# Patient Record
Sex: MALE | Race: WHITE | NOT HISPANIC OR LATINO | Employment: OTHER | URBAN - METROPOLITAN AREA
[De-identification: names, ages, dates, MRNs, and addresses within clinical notes are randomized per-mention and may not be internally consistent; named-entity substitution may affect disease eponyms.]

---

## 2017-02-13 RX ORDER — FUROSEMIDE 40 MG/1
TABLET ORAL
Qty: 90 TABLET | Refills: 1 | Status: SHIPPED | OUTPATIENT
Start: 2017-02-13 | End: 2017-05-02 | Stop reason: SDUPTHER

## 2017-03-27 RX ORDER — TESTOSTERONE 16.2 MG/G
GEL TRANSDERMAL
Qty: 450 G | Refills: 1
Start: 2017-03-27 | End: 2018-03-05 | Stop reason: SDUPTHER

## 2017-05-02 ENCOUNTER — OFFICE VISIT (OUTPATIENT)
Dept: ENDOCRINOLOGY | Age: 82
End: 2017-05-02

## 2017-05-02 VITALS
SYSTOLIC BLOOD PRESSURE: 136 MMHG | DIASTOLIC BLOOD PRESSURE: 78 MMHG | HEIGHT: 72 IN | BODY MASS INDEX: 28.5 KG/M2 | HEART RATE: 59 BPM | OXYGEN SATURATION: 94 % | WEIGHT: 210.4 LBS

## 2017-05-02 DIAGNOSIS — I77.9 CAROTID ARTERY DISEASE, UNSPECIFIED LATERALITY (HCC): ICD-10-CM

## 2017-05-02 DIAGNOSIS — E89.3 HYPOPITUITARISM AFTER ADENOMA RESECTION (HCC): ICD-10-CM

## 2017-05-02 DIAGNOSIS — E27.49 SECONDARY ADRENAL INSUFFICIENCY (HCC): ICD-10-CM

## 2017-05-02 DIAGNOSIS — E78.5 HYPERLIPIDEMIA, UNSPECIFIED HYPERLIPIDEMIA TYPE: ICD-10-CM

## 2017-05-02 DIAGNOSIS — E23.0 HYPOGONADOTROPIC HYPOGONADISM (HCC): ICD-10-CM

## 2017-05-02 DIAGNOSIS — D35.2 BENIGN NEOPLASM OF PITUITARY GLAND (HCC): Primary | ICD-10-CM

## 2017-05-02 DIAGNOSIS — E11.9 TYPE 2 DIABETES MELLITUS WITHOUT COMPLICATION, WITHOUT LONG-TERM CURRENT USE OF INSULIN (HCC): ICD-10-CM

## 2017-05-02 DIAGNOSIS — D47.2 MGUS (MONOCLONAL GAMMOPATHY OF UNKNOWN SIGNIFICANCE): ICD-10-CM

## 2017-05-02 DIAGNOSIS — I87.2 VENOUS INSUFFICIENCY: ICD-10-CM

## 2017-05-02 DIAGNOSIS — E03.8 SECONDARY HYPOTHYROIDISM: ICD-10-CM

## 2017-05-02 DIAGNOSIS — E55.9 VITAMIN D DEFICIENCY: ICD-10-CM

## 2017-05-02 PROCEDURE — 99214 OFFICE O/P EST MOD 30 MIN: CPT | Performed by: INTERNAL MEDICINE

## 2017-05-02 RX ORDER — FUROSEMIDE 40 MG/1
TABLET ORAL
Qty: 90 TABLET | Refills: 2 | OUTPATIENT
Start: 2017-05-02 | End: 2017-09-12

## 2017-05-02 RX ORDER — EZETIMIBE AND SIMVASTATIN 10; 40 MG/1; MG/1
1 TABLET ORAL
COMMUNITY
Start: 2013-03-04 | End: 2017-05-02

## 2017-05-03 LAB
25(OH)D3+25(OH)D2 SERPL-MCNC: 46.6 NG/ML (ref 30–100)
ALBUMIN SERPL-MCNC: 4.1 G/DL (ref 3.5–5.2)
ALBUMIN/CREAT UR: 10.6 MG/G CREAT (ref 0–30)
ALBUMIN/GLOB SERPL: 1.1 G/DL
ALP SERPL-CCNC: 83 U/L (ref 39–117)
ALT SERPL-CCNC: 18 U/L (ref 1–41)
AST SERPL-CCNC: 15 U/L (ref 1–40)
BASOPHILS # BLD AUTO: 0.01 10*3/MM3 (ref 0–0.2)
BASOPHILS NFR BLD AUTO: 0.1 % (ref 0–1.5)
BILIRUB SERPL-MCNC: 0.5 MG/DL (ref 0.1–1.2)
BUN SERPL-MCNC: 15 MG/DL (ref 8–23)
BUN/CREAT SERPL: 11.3 (ref 7–25)
CALCIUM SERPL-MCNC: 9.1 MG/DL (ref 8.6–10.5)
CHLORIDE SERPL-SCNC: 103 MMOL/L (ref 98–107)
CHOLEST SERPL-MCNC: 190 MG/DL (ref 0–200)
CO2 SERPL-SCNC: 26.6 MMOL/L (ref 22–29)
CONV COMMENT: NORMAL
CREAT SERPL-MCNC: 1.33 MG/DL (ref 0.76–1.27)
CREAT UR-MCNC: 63.9 MG/DL
EOSINOPHIL # BLD AUTO: 0.27 10*3/MM3 (ref 0–0.7)
EOSINOPHIL NFR BLD AUTO: 3.3 % (ref 0.3–6.2)
ERYTHROCYTE [DISTWIDTH] IN BLOOD BY AUTOMATED COUNT: 15 % (ref 11.5–14.5)
GLOBULIN SER CALC-MCNC: 3.6 GM/DL
GLUCOSE SERPL-MCNC: 89 MG/DL (ref 65–99)
HBA1C MFR BLD: 5.62 % (ref 4.8–5.6)
HCT VFR BLD AUTO: 44.8 % (ref 40.4–52.2)
HDLC SERPL-MCNC: 43 MG/DL (ref 40–60)
HGB BLD-MCNC: 14.5 G/DL (ref 13.7–17.6)
IMM GRANULOCYTES # BLD: 0 10*3/MM3 (ref 0–0.03)
IMM GRANULOCYTES NFR BLD: 0 % (ref 0–0.5)
LDLC SERPL CALC-MCNC: 104 MG/DL (ref 0–100)
LYMPHOCYTES # BLD AUTO: 1.48 10*3/MM3 (ref 0.9–4.8)
LYMPHOCYTES NFR BLD AUTO: 18.1 % (ref 19.6–45.3)
MCH RBC QN AUTO: 34 PG (ref 27–32.7)
MCHC RBC AUTO-ENTMCNC: 32.4 G/DL (ref 32.6–36.4)
MCV RBC AUTO: 104.9 FL (ref 79.8–96.2)
MICROALBUMIN UR-MCNC: 6.8 UG/ML
MONOCYTES # BLD AUTO: 0.62 10*3/MM3 (ref 0.2–1.2)
MONOCYTES NFR BLD AUTO: 7.6 % (ref 5–12)
NEUTROPHILS # BLD AUTO: 5.79 10*3/MM3 (ref 1.9–8.1)
NEUTROPHILS NFR BLD AUTO: 70.9 % (ref 42.7–76)
PLATELET # BLD AUTO: 149 10*3/MM3 (ref 140–500)
POTASSIUM SERPL-SCNC: 4.6 MMOL/L (ref 3.5–5.2)
PROT SERPL-MCNC: 7.7 G/DL (ref 6–8.5)
RBC # BLD AUTO: 4.27 10*6/MM3 (ref 4.6–6)
SODIUM SERPL-SCNC: 144 MMOL/L (ref 136–145)
T4 FREE SERPL-MCNC: 1.23 NG/DL (ref 0.93–1.7)
TESTOST SERPL-MCNC: 420 NG/DL (ref 348–1197)
TRIGL SERPL-MCNC: 216 MG/DL (ref 0–150)
VLDLC SERPL CALC-MCNC: 43.2 MG/DL (ref 5–40)
WBC # BLD AUTO: 8.17 10*3/MM3 (ref 4.5–10.7)

## 2017-05-04 LAB
FOLATE SERPL-MCNC: >20 NG/ML (ref 4.78–24.2)
Lab: NORMAL
VIT B12 SERPL-MCNC: 587 PG/ML (ref 211–946)
WRITTEN AUTHORIZATION: NORMAL

## 2017-05-10 ENCOUNTER — TELEPHONE (OUTPATIENT)
Dept: ONCOLOGY | Facility: CLINIC | Age: 82
End: 2017-05-10

## 2017-05-10 DIAGNOSIS — D47.2 MGUS (MONOCLONAL GAMMOPATHY OF UNKNOWN SIGNIFICANCE): Primary | ICD-10-CM

## 2017-05-19 ENCOUNTER — APPOINTMENT (OUTPATIENT)
Dept: LAB | Facility: HOSPITAL | Age: 82
End: 2017-05-19

## 2017-06-02 ENCOUNTER — APPOINTMENT (OUTPATIENT)
Dept: ONCOLOGY | Facility: CLINIC | Age: 82
End: 2017-06-02

## 2017-06-02 ENCOUNTER — APPOINTMENT (OUTPATIENT)
Dept: LAB | Facility: HOSPITAL | Age: 82
End: 2017-06-02

## 2017-07-07 RX ORDER — PREDNISONE 1 MG/1
TABLET ORAL
Qty: 135 TABLET | Refills: 1 | OUTPATIENT
Start: 2017-07-07 | End: 2017-09-12

## 2017-07-10 ENCOUNTER — TELEPHONE (OUTPATIENT)
Dept: ENDOCRINOLOGY | Age: 82
End: 2017-07-10

## 2017-07-10 ENCOUNTER — APPOINTMENT (OUTPATIENT)
Dept: GENERAL RADIOLOGY | Facility: HOSPITAL | Age: 82
End: 2017-07-10

## 2017-07-10 ENCOUNTER — HOSPITAL ENCOUNTER (EMERGENCY)
Facility: HOSPITAL | Age: 82
Discharge: HOME | End: 2017-07-10
Attending: EMERGENCY MEDICINE | Admitting: EMERGENCY MEDICINE

## 2017-07-10 VITALS
RESPIRATION RATE: 18 BRPM | OXYGEN SATURATION: 94 % | BODY MASS INDEX: 27.09 KG/M2 | HEART RATE: 73 BPM | DIASTOLIC BLOOD PRESSURE: 75 MMHG | WEIGHT: 200 LBS | SYSTOLIC BLOOD PRESSURE: 141 MMHG | TEMPERATURE: 97.7 F | HEIGHT: 72 IN

## 2017-07-10 DIAGNOSIS — R53.1 WEAKNESS: Primary | ICD-10-CM

## 2017-07-10 DIAGNOSIS — I87.2 VENOUS INSUFFICIENCY: ICD-10-CM

## 2017-07-10 LAB
ALBUMIN SERPL-MCNC: 4.1 G/DL (ref 3.5–5.2)
ALBUMIN/GLOB SERPL: 0.9 G/DL
ALP SERPL-CCNC: 89 U/L (ref 39–117)
ALT SERPL W P-5'-P-CCNC: 11 U/L (ref 1–41)
ANION GAP SERPL CALCULATED.3IONS-SCNC: 12.8 MMOL/L
AST SERPL-CCNC: 23 U/L (ref 1–40)
BASOPHILS # BLD AUTO: 0.03 10*3/MM3 (ref 0–0.2)
BASOPHILS NFR BLD AUTO: 0.5 % (ref 0–1.5)
BILIRUB SERPL-MCNC: 1 MG/DL (ref 0.1–1.2)
BILIRUB UR QL STRIP: NEGATIVE
BUN BLD-MCNC: 14 MG/DL (ref 8–23)
BUN/CREAT SERPL: 7.5 (ref 7–25)
CALCIUM SPEC-SCNC: 9.2 MG/DL (ref 8.6–10.5)
CHLORIDE SERPL-SCNC: 93 MMOL/L (ref 98–107)
CLARITY UR: CLEAR
CO2 SERPL-SCNC: 29.2 MMOL/L (ref 22–29)
COLOR UR: YELLOW
CREAT BLD-MCNC: 1.86 MG/DL (ref 0.76–1.27)
DEPRECATED RDW RBC AUTO: 49.3 FL (ref 37–54)
EOSINOPHIL # BLD AUTO: 0.65 10*3/MM3 (ref 0–0.7)
EOSINOPHIL NFR BLD AUTO: 11 % (ref 0.3–6.2)
ERYTHROCYTE [DISTWIDTH] IN BLOOD BY AUTOMATED COUNT: 13.1 % (ref 11.5–14.5)
GFR SERPL CREATININE-BSD FRML MDRD: 35 ML/MIN/1.73
GLOBULIN UR ELPH-MCNC: 4.4 GM/DL
GLUCOSE BLD-MCNC: 103 MG/DL (ref 65–99)
GLUCOSE UR STRIP-MCNC: NEGATIVE MG/DL
HCT VFR BLD AUTO: 50.2 % (ref 40.4–52.2)
HGB BLD-MCNC: 16.6 G/DL (ref 13.7–17.6)
HGB UR QL STRIP.AUTO: NEGATIVE
HOLD SPECIMEN: NORMAL
HOLD SPECIMEN: NORMAL
IMM GRANULOCYTES # BLD: 0.02 10*3/MM3 (ref 0–0.03)
IMM GRANULOCYTES NFR BLD: 0.3 % (ref 0–0.5)
KETONES UR QL STRIP: NEGATIVE
LEUKOCYTE ESTERASE UR QL STRIP.AUTO: NEGATIVE
LYMPHOCYTES # BLD AUTO: 1.37 10*3/MM3 (ref 0.9–4.8)
LYMPHOCYTES NFR BLD AUTO: 23.2 % (ref 19.6–45.3)
MAGNESIUM SERPL-MCNC: 2.4 MG/DL (ref 1.6–2.4)
MCH RBC QN AUTO: 33.7 PG (ref 27–32.7)
MCHC RBC AUTO-ENTMCNC: 33.1 G/DL (ref 32.6–36.4)
MCV RBC AUTO: 102 FL (ref 79.8–96.2)
MONOCYTES # BLD AUTO: 0.63 10*3/MM3 (ref 0.2–1.2)
MONOCYTES NFR BLD AUTO: 10.7 % (ref 5–12)
NEUTROPHILS # BLD AUTO: 3.21 10*3/MM3 (ref 1.9–8.1)
NEUTROPHILS NFR BLD AUTO: 54.3 % (ref 42.7–76)
NITRITE UR QL STRIP: NEGATIVE
PH UR STRIP.AUTO: 6 [PH] (ref 5–8)
PLATELET # BLD AUTO: 165 10*3/MM3 (ref 140–500)
PMV BLD AUTO: 11.9 FL (ref 6–12)
POTASSIUM BLD-SCNC: 4.2 MMOL/L (ref 3.5–5.2)
PROT SERPL-MCNC: 8.5 G/DL (ref 6–8.5)
PROT UR QL STRIP: NEGATIVE
RBC # BLD AUTO: 4.92 10*6/MM3 (ref 4.6–6)
SODIUM BLD-SCNC: 135 MMOL/L (ref 136–145)
SP GR UR STRIP: 1.01 (ref 1–1.03)
TROPONIN T SERPL-MCNC: 0.02 NG/ML (ref 0–0.03)
UROBILINOGEN UR QL STRIP: NORMAL
WBC NRBC COR # BLD: 5.91 10*3/MM3 (ref 4.5–10.7)
WHOLE BLOOD HOLD SPECIMEN: NORMAL
WHOLE BLOOD HOLD SPECIMEN: NORMAL

## 2017-07-10 PROCEDURE — 84484 ASSAY OF TROPONIN QUANT: CPT | Performed by: EMERGENCY MEDICINE

## 2017-07-10 PROCEDURE — 36415 COLL VENOUS BLD VENIPUNCTURE: CPT | Performed by: EMERGENCY MEDICINE

## 2017-07-10 PROCEDURE — 80053 COMPREHEN METABOLIC PANEL: CPT | Performed by: EMERGENCY MEDICINE

## 2017-07-10 PROCEDURE — 85025 COMPLETE CBC W/AUTO DIFF WBC: CPT | Performed by: EMERGENCY MEDICINE

## 2017-07-10 PROCEDURE — 83735 ASSAY OF MAGNESIUM: CPT | Performed by: EMERGENCY MEDICINE

## 2017-07-10 PROCEDURE — 81003 URINALYSIS AUTO W/O SCOPE: CPT | Performed by: EMERGENCY MEDICINE

## 2017-07-10 PROCEDURE — 71020 HC CHEST PA AND LATERAL: CPT

## 2017-07-10 PROCEDURE — 99283 EMERGENCY DEPT VISIT LOW MDM: CPT

## 2017-07-10 RX ORDER — SODIUM CHLORIDE 0.9 % (FLUSH) 0.9 %
10 SYRINGE (ML) INJECTION AS NEEDED
Status: DISCONTINUED | OUTPATIENT
Start: 2017-07-10 | End: 2017-07-11 | Stop reason: HOSPADM

## 2017-07-10 RX ORDER — FUROSEMIDE 20 MG/1
20 TABLET ORAL DAILY
Qty: 30 TABLET | Refills: 0 | Status: SHIPPED | OUTPATIENT
Start: 2017-07-10 | End: 2017-08-01

## 2017-07-10 RX ORDER — PREDNISONE 10 MG/1
10 TABLET ORAL 2 TIMES DAILY
Qty: 14 TABLET | Refills: 0 | Status: SHIPPED | OUTPATIENT
Start: 2017-07-10 | End: 2017-08-01

## 2017-07-10 NOTE — ED TRIAGE NOTES
"Family states that the patient has been very lethargic and had loss of appetite for the last three days. Patient complains of nausea and states \"I'm just not hungry.\" Denies any vomiting, fever, or urinary symptoms.   "

## 2017-07-10 NOTE — TELEPHONE ENCOUNTER
----- Message from Daisy Mcmillan sent at 7/10/2017 10:11 AM EDT -----  Contact: patient son  [phone]  fatique  And not eating    You said you would talk to dr mccain instead of an appointment

## 2017-07-11 NOTE — ED PROVIDER NOTES
EMERGENCY DEPARTMENT ENCOUNTER    CHIEF COMPLAINT  Chief Complaint: fatigue   History given by: pt, son   History limited by: none  Room Number: 13/13  PMD: Justin Puga MD      HPI:  Pt is a 81 y.o. male who presents complaining of fatigue and general malaise for the past 3 days. He also c/o mild dizziness and decreased appetite. He denies CP, weakness, neuro deficits, syncope and other complaints at this time. He called his PCP who referred him to urgent care, then he was referred to the ED by .     Duration:  3 days   Onset: gradual   Timing: constant   Location: generalized   Radiation: none  Quality: fatigue   Intensity/Severity: moderate   Progression: persistent   Associated Symptoms: general malaise, dizziness, decreased appetite   Aggravating Factors: none  Alleviating Factors: none  Previous Episodes: none reported   Treatment before arrival: PTA he was seen at  and referred to the ED for further evaluation.     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 06/10/2016   • Hyperlipidemia 06/10/2016   • Type 2 diabetes mellitus without complication, without long-term current use of insulin 06/10/2016   • Carotid artery disease 06/10/2016   • Hypogonadotropic hypogonadism 06/10/2016   • Secondary hypothyroidism 06/10/2016   • Secondary adrenal insufficiency 06/10/2016     Resolved Ambulatory Problems     Diagnosis Date Noted   • No Resolved Ambulatory Problems     Past Medical History:   Diagnosis Date   • Aortic stenosis    • Atherosclerotic heart disease of native coronary artery without angina pectoris    • Basal cell carcinoma of skin 09/24/2014   •  Benign prostatic hypertrophy    • Claustrophobia    • Disease of thyroid gland    • Glaucoma    • Hematoma of arm    • Hyperlipidemia    • Hypertension    • Hypopituitarism    • IgA monoclonal gammopathy    • Low testosterone    • Obesity    • 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 Negative for activity change and fever.        Generalized malaise    HENT: Negative for congestion and sore throat.    Eyes: Negative.    Respiratory: Negative for cough and shortness of breath.    Cardiovascular: Negative for chest pain and leg swelling.   Gastrointestinal: Negative for abdominal pain, diarrhea and vomiting.   Endocrine:  Negative.    Genitourinary: Negative for decreased urine volume and dysuria.   Musculoskeletal: Negative for neck pain.   Skin: Negative for rash and wound.   Allergic/Immunologic: Negative.    Neurological: Positive for dizziness. Negative for weakness, numbness and headaches.   Hematological: Negative.    Psychiatric/Behavioral: Negative.    All other systems reviewed and are negative.      PHYSICAL EXAM  ED Triage Vitals   Temp Heart Rate Resp BP SpO2   07/10/17 1353 07/10/17 1353 07/10/17 1353 07/10/17 1507 07/10/17 1353   97.7 °F (36.5 °C) 87 18 126/66 96 %      Temp src Heart Rate Source Patient Position BP Location FiO2 (%)   -- 07/10/17 1353 07/10/17 1507 07/10/17 1507 --    Monitor Sitting Right arm        Physical Exam   Constitutional: He is oriented to person, place, and time and well-developed, well-nourished, and in no distress.   HENT:   Head: Normocephalic and atraumatic.   Eyes: EOM are normal. Pupils are equal, round, and reactive to light.   Neck: Normal range of motion. Neck supple.   Cardiovascular: Normal rate, regular rhythm and normal heart sounds.    Pulmonary/Chest: Effort normal and breath sounds normal. No respiratory distress.   Abdominal: Soft. There is no tenderness. There is no rebound and no guarding.   Musculoskeletal: Normal range of motion. He exhibits no edema.   Neurological: He is alert and oriented to person, place, and time. He has normal sensation and normal strength.   Skin: Skin is warm and dry.   Psychiatric: Mood and affect normal.   Nursing note and vitals reviewed.      LAB RESULTS  Lab Results (last 24 hours)     Procedure Component Value Units Date/Time    CBC & Differential [[id number]] Collected:  07/10/17 1515    Specimen:  Blood Updated:  07/10/17 1554    Narrative:       The following orders were created for panel order CBC & Differential.  Procedure                               Abnormality         Status                     ---------                                -----------         ------                     CBC Auto Differential[[id number]]        Abnormal            Final result                 Please view results for these tests on the individual orders.    Comprehensive Metabolic Panel [[id number]]  (Abnormal) Collected:  07/10/17 1515    Specimen:  Blood Updated:  07/10/17 1604     Glucose 103 (H) mg/dL      BUN 14 mg/dL      Creatinine 1.86 (H) mg/dL      Sodium 135 (L) mmol/L      Potassium 4.2 mmol/L      Chloride 93 (L) mmol/L      CO2 29.2 (H) mmol/L      Calcium 9.2 mg/dL      Total Protein 8.5 g/dL      Albumin 4.10 g/dL      ALT (SGPT) 11 U/L      AST (SGOT) 23 U/L      Alkaline Phosphatase 89 U/L      Total Bilirubin 1.0 mg/dL      eGFR Non African Amer 35 (L) mL/min/1.73      Globulin 4.4 gm/dL      A/G Ratio 0.9 g/dL      BUN/Creatinine Ratio 7.5     Anion Gap 12.8 mmol/L     Narrative:       The MDRD GFR formula is only valid for adults with stable renal function between ages 18 and 70.    Troponin [[id number]]  (Normal) Collected:  07/10/17 1515    Specimen:  Blood Updated:  07/10/17 1604     Troponin T 0.022 ng/mL     Narrative:       Troponin T Reference Ranges:  Less than 0.03 ng/mL:    Negative for AMI  0.03 to 0.09 ng/mL:      Indeterminant for AMI  Greater than 0.09 ng/mL: Positive for AMI    Magnesium [[id number]]  (Normal) Collected:  07/10/17 1515    Specimen:  Blood Updated:  07/10/17 1604     Magnesium 2.4 mg/dL     CBC Auto Differential [[id number]]  (Abnormal) Collected:  07/10/17 1515    Specimen:  Blood Updated:  07/10/17 1554     WBC 5.91 10*3/mm3      RBC 4.92 10*6/mm3      Hemoglobin 16.6 g/dL      Hematocrit 50.2 %      .0 (H) fL      MCH 33.7 (H) pg      MCHC 33.1 g/dL      RDW 13.1 %      RDW-SD 49.3 fl      MPV 11.9 fL      Platelets 165 10*3/mm3      Neutrophil % 54.3 %      Lymphocyte % 23.2 %      Monocyte % 10.7 %      Eosinophil % 11.0 (H) %      Basophil % 0.5 %      Immature Grans % 0.3 %      Neutrophils, Absolute 3.21  10*3/mm3      Lymphocytes, Absolute 1.37 10*3/mm3      Monocytes, Absolute 0.63 10*3/mm3      Eosinophils, Absolute 0.65 10*3/mm3      Basophils, Absolute 0.03 10*3/mm3      Immature Grans, Absolute 0.02 10*3/mm3     Urinalysis With / Culture If Indicated [[id number]]  (Normal) Collected:  07/10/17 1712    Specimen:  Urine from Urine, Clean Catch Updated:  07/10/17 1736     Color, UA Yellow     Appearance, UA Clear     pH, UA 6.0     Specific Gravity, UA 1.014     Glucose, UA Negative     Ketones, UA Negative     Bilirubin, UA Negative     Blood, UA Negative     Protein, UA Negative     Leuk Esterase, UA Negative     Nitrite, UA Negative     Urobilinogen, UA 1.0 E.U./dL    Narrative:       Urine microscopic not indicated.          I ordered the above labs and reviewed the results    RADIOLOGY  XR Chest 2 View   Preliminary Result   No acute process.               I ordered the above noted radiological studies. Interpreted by radiologist.  Reviewed by me in PACS.       PROCEDURES  Procedures      PROGRESS AND CONSULTS  ED Course       15:10 Ordered blood work, Tropoin, Magnesium, UA and CXR for further evaluation.     20:28 Will increase steroids, and decrease Lasix.     Spoke with pt about elevated creatinine and plan to discharge home with increased steroid dose, and decreased Lasix dose. Instructed to drink fluids.  Pt and son agree with plan. Addressed all questions. Stable vitals.       MEDICAL DECISION MAKING  Results were reviewed/discussed with the patient and they were also made aware of online access. Pt also made aware that some labs, such as cultures, will not be resulted during ER visit and follow up with PMD is necessary.     MDM  Number of Diagnoses or Management Options     Amount and/or Complexity of Data Reviewed  Clinical lab tests: ordered and reviewed (CR of 1.86   BUN of 14)  Tests in the radiology section of CPT®: reviewed and ordered (CXR showed the heart size is within normal limits. Lungs  appear free of acute infiltrates. )  Decide to obtain previous medical records or to obtain history from someone other than the patient: yes  Review and summarize past medical records: yes (BUN and CR are elevated when compared with most recent labs. )  Independent visualization of images, tracings, or specimens: yes    Patient Progress  Patient progress: stable         DIAGNOSIS  Final diagnoses:   Weakness       DISPOSITION  DISCHARGE    Patient discharged in stable condition.    Reviewed implications of results, diagnosis, meds, responsibility to follow up, warning signs and symptoms of possible worsening, potential complications and reasons to return to the ED.     Patient/Family voiced understanding of above instructions.    Discussed plan for discharge, as there is no emergent indication for admission.  Pt/family is agreeable and understands need for follow up and repeat testing.  Pt is aware that discharge does not mean that nothing is wrong but it indicates no emergency is present that requires admission and they must continue care with follow-up as given below or physician of their choice.     FOLLOW-UP  Justin Puga MD  [address] [address]  [phone]               Medication List      Changed          furosemide 20 MG tablet   Commonly known as:  LASIX   Take 1 tablet by mouth Daily.   What changed:    - medication strength  - how much to take  - how to take this  - when to take this  - additional instructions       predniSONE 10 MG tablet   Commonly known as:  DELTASONE   Take 1 tablet by mouth 2 (Two) Times a Day.   What changed:  See the new instructions.               Latest Documented Vital Signs:  As of 10:30 PM  BP- 141/75 HR- 73 Temp- 97.7 °F (36.5 °C) O2 sat- 94%    --  Documentation assistance provided by kalina Jackson for Dr. Dolan.  Information recorded by the kalina was done at my direction and has been verified and validated by me.     Landy  Manuel  07/10/17 [id number]       Adan Dolan MD  07/10/17 [id number]

## 2017-07-11 NOTE — ED NOTES
Pt discharged with discharge instructions and follow up appointment suggested.  Pt alert and oriented x4, wheeled to front entrance. Pt and son had no questions at this time.     Lisa Ruvalcaba RN  07/10/17 [id number]

## 2017-07-12 ENCOUNTER — TELEPHONE (OUTPATIENT)
Dept: SOCIAL WORK | Facility: HOSPITAL | Age: 82
End: 2017-07-12

## 2017-07-26 RX ORDER — NITROGLYCERIN 0.4 MG/1
TABLET SUBLINGUAL
Qty: 25 TABLET | Refills: 0 | Status: ON HOLD | OUTPATIENT
Start: 2017-07-26 | End: 2018-01-15

## 2017-08-01 ENCOUNTER — OFFICE VISIT (OUTPATIENT)
Dept: CARDIOLOGY | Facility: CLINIC | Age: 82
End: 2017-08-01

## 2017-08-01 VITALS
BODY MASS INDEX: 27.71 KG/M2 | WEIGHT: 204.6 LBS | SYSTOLIC BLOOD PRESSURE: 126 MMHG | HEIGHT: 72 IN | DIASTOLIC BLOOD PRESSURE: 76 MMHG | HEART RATE: 85 BPM

## 2017-08-01 DIAGNOSIS — I35.0 NONRHEUMATIC AORTIC VALVE STENOSIS: Primary | ICD-10-CM

## 2017-08-01 DIAGNOSIS — I51.7 LVH (LEFT VENTRICULAR HYPERTROPHY): ICD-10-CM

## 2017-08-01 PROCEDURE — 99213 OFFICE O/P EST LOW 20 MIN: CPT | Performed by: INTERNAL MEDICINE

## 2017-08-01 PROCEDURE — 93000 ELECTROCARDIOGRAM COMPLETE: CPT | Performed by: INTERNAL MEDICINE

## 2017-08-01 RX ORDER — ASPIRIN 325 MG
81 TABLET, DELAYED RELEASE (ENTERIC COATED) ORAL DAILY
COMMUNITY
End: 2018-01-15 | Stop reason: HOSPADM

## 2017-08-01 NOTE — PROGRESS NOTES
Date of Office Visit: 2017  Encounter Provider: Bony Figueroa MD  Place of Service: [facility] CARDIOLOGY  Patient Name: Jesse Taylor  :1935  [id number]    Chief Complaint   Patient presents with   • Cardiac Valve Problem   • Coronary Artery Disease   :     HPI: Jesse Taylor is a 81 y.o. male  he's here for follow-up of his aortic stenosis I haven't seen him in about 16 months sadly his wife  of colon cancer in that interim and he is living alone he is not having chest pain or shortness of breath no PND no orthopnea no edema.  No syncope or palpitations.  When we last saw him he had what I would gauge as moderate AS with a peak gradient of 43 and a mean of 25 severe concentric LVH    Past Medical History:   Diagnosis Date   • Aortic stenosis      mod to severe with GISELE of 1 on echo;  severe AS 0.75; nl LVSF; severe AI; BNP nl   • Atherosclerotic heart disease of native coronary artery without angina pectoris    • Basal cell carcinoma of skin 2014    behind ear   • Benign prostatic hypertrophy    • Bony sclerosis    • Claustrophobia    • Disease of thyroid gland    • Edema    • Glaucoma    • Growth hormone deficiency    • Hematoma of arm     resolved 2015   • 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    • Hyperlipidemia    • Hypertension    • Hypogonadism, male    • Hypopituitarism    • Hypopituitarism    • IgA monoclonal gammopathy    • Lacunar stroke    • Low testosterone    • Monoclonal gammopathy of undetermined significance    • Obesity    • 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   HENT: Negative for nosebleeds.    Eyes: Negative for double vision.   Cardiovascular: Negative for chest pain, claudication, cyanosis, dyspnea on exertion, irregular heartbeat, leg swelling, near-syncope, orthopnea, palpitations, paroxysmal nocturnal dyspnea and syncope.   Respiratory: Negative for cough, hemoptysis and shortness of breath.    Hematologic/Lymphatic: Negative for bleeding problem.   Skin: Negative for rash.   Musculoskeletal: Negative for falls and myalgias.   Gastrointestinal: Negative for hematochezia, jaundice, melena, nausea and vomiting.   Genitourinary: Negative for hematuria.   Neurological: Negative for dizziness and seizures.   Psychiatric/Behavioral: Negative for altered mental status and memory loss.       Allergies   Allergen Reactions   • Somatropin Other (See Comments)     ARTHRALGIA         Current Outpatient  "Prescriptions:   •  aspirin  MG tablet, Take 325 mg by mouth Every 6 (Six) Hours As Needed., Disp: , Rfl:   •  bimatoprost (LUMIGAN) 0.01 % ophthalmic drops, Apply 1 drop to eye nightly. Both eyes., Disp: , Rfl:   •  brimonidine (ALPHAGAN P) 0.1 % solution ophthalmic solution, Apply 1 drop to eye., Disp: , Rfl:   •  Cholecalciferol (VITAMIN D3) 1000 UNITS capsule, Take  by mouth., Disp: , Rfl:   •  furosemide (LASIX) 40 MG tablet, One tablet every morning, Disp: 90 tablet, Rfl: 2  •  ketoconazole (NIZORAL) 2 % shampoo, Apply  topically 1 (one) time per week., Disp: , Rfl:   •  levothyroxine (SYNTHROID, LEVOTHROID) 150 MCG tablet, TAKE 1 TABLET DAILY, Disp: 90 tablet, Rfl: 2  •  Multiple Vitamin (MULTI VITAMIN DAILY PO), Take 1 tablet by mouth daily., Disp: , Rfl:   •  nitroglycerin (NITROSTAT) 0.4 MG SL tablet, 1 under the tongue as needed for angina, may repeat q5mins for up three doses, Disp: 25 tablet, Rfl: 0  •  predniSONE (DELTASONE) 5 MG tablet, TAKE ONE TABLET BY MOUTH EVERY MORNING AND 1/2 TABLET BY MOUTH EVERY EVENING, Disp: 135 tablet, Rfl: 1  •  simvastatin (ZOCOR) 40 MG tablet, TAKE ONE TABLET BY MOUTH EVERY EVENING, Disp: 90 tablet, Rfl: 2  •  Testosterone 20.25 MG/ACT (1.62%) gel, Apply 4 pump presses one time daily as directed, Disp: 450 g, Rfl: 1     Objective:     Vitals:    08/01/17 1530   BP: 126/76   Pulse: 85   Weight: 204 lb 9.6 oz (92.8 kg)   Height: 72\" (182.9 cm)     Body mass index is 27.75 kg/(m^2).    Physical Exam   Constitutional: He is oriented to person, place, and time. He appears well-developed and well-nourished.   HENT:   Head: Normocephalic.   Eyes: No scleral icterus.   Neck: No JVD present. No thyromegaly present.   Cardiovascular: Normal rate and regular rhythm.  Exam reveals no gallop and no friction rub.    Murmur heard.   Crescendo-decrescendo midsystolic murmur is present with a grade of 3/6   Pulmonary/Chest: Effort normal and breath sounds normal. He has no " wheezes. He has no rales.   Abdominal: Soft. There is no hepatosplenomegaly. There is no tenderness.   Musculoskeletal: Normal range of motion. He exhibits no edema.   Lymphadenopathy:     He has no cervical adenopathy.   Neurological: He is alert and oriented to person, place, and time.   Skin: Skin is warm and dry. No rash noted.   Psychiatric: He has a normal mood and affect.         ECG 12 Lead  Date/Time: 8/1/2017 3:57 PM  Performed by: CARLOS FIGUEROA  Authorized by: CARLOS FIGUEROA   Rhythm: sinus rhythm  Clinical impression: abnormal ECG  Comments: Multifocal PVCs, old inf lat MI        no change from prior ecg     Assessment:       Diagnosis Plan   1. Nonrheumatic aortic valve stenosis     2. LVH (left ventricular hypertrophy)            Plan:       He seems to be doing okay from a cardiac standpoint I would like to get another echo on him the questions cannot be if it shows severe area S or if he develops symptoms along with him whether we can and do he seems to be a little bit mildly disheveled today and I'm not sure how good his cognitive function is area did he is recently  and is we'll know min struggle after that.  I probably would be conservative with him.  Fortunately he is asymptomatic now    As always, it has been a pleasure to participate in your patient's care.      Sincerely,       Carlos Figueroa MD

## 2017-09-05 RX ORDER — LEVOTHYROXINE SODIUM 0.15 MG/1
TABLET ORAL
Qty: 90 TABLET | Refills: 1 | Status: SHIPPED | OUTPATIENT
Start: 2017-09-05 | End: 2018-01-06 | Stop reason: SDUPTHER

## 2017-09-11 ENCOUNTER — HOSPITAL ENCOUNTER (OUTPATIENT)
Dept: CARDIOLOGY | Facility: HOSPITAL | Age: 82
Discharge: HOME | End: 2017-09-11
Attending: INTERNAL MEDICINE | Admitting: INTERNAL MEDICINE

## 2017-09-11 DIAGNOSIS — I51.7 LVH (LEFT VENTRICULAR HYPERTROPHY): ICD-10-CM

## 2017-09-11 DIAGNOSIS — I35.0 NONRHEUMATIC AORTIC VALVE STENOSIS: ICD-10-CM

## 2017-09-11 LAB
AORTIC DIMENSIONLESS INDEX: 5.5 CM2
BH CV ECHO MEAS - ACS: 1.2 CM
BH CV ECHO MEAS - AI DEC SLOPE: 270 CM/SEC^2
BH CV ECHO MEAS - AI MAX PG: 64 MMHG
BH CV ECHO MEAS - AI MAX VEL: 400 CM/SEC
BH CV ECHO MEAS - AI P1/2T: 433.9 MSEC
BH CV ECHO MEAS - AO MAX PG (FULL): 54.2 MMHG
BH CV ECHO MEAS - AO MAX PG: 56.9 MMHG
BH CV ECHO MEAS - AO MEAN PG (FULL): 33 MMHG
BH CV ECHO MEAS - AO MEAN PG: 35 MMHG
BH CV ECHO MEAS - AO ROOT AREA (BSA CORRECTED): 1.7
BH CV ECHO MEAS - AO ROOT AREA: 10.8 CM^2
BH CV ECHO MEAS - AO ROOT DIAM: 3.7 CM
BH CV ECHO MEAS - AO V2 MAX: 377 CM/SEC
BH CV ECHO MEAS - AO V2 MEAN: 276 CM/SEC
BH CV ECHO MEAS - AO V2 VTI: 97.9 CM
BH CV ECHO MEAS - AVA(I,A): 0.61 CM^2
BH CV ECHO MEAS - AVA(I,D): 0.61 CM^2
BH CV ECHO MEAS - AVA(V,A): 0.62 CM^2
BH CV ECHO MEAS - AVA(V,D): 0.62 CM^2
BH CV ECHO MEAS - BSA(HAYCOCK): 2.2 M^2
BH CV ECHO MEAS - BSA: 2.1 M^2
BH CV ECHO MEAS - BZI_BMI: 27.1 KILOGRAMS/M^2
BH CV ECHO MEAS - BZI_METRIC_HEIGHT: 182.9 CM
BH CV ECHO MEAS - BZI_METRIC_WEIGHT: 90.7 KG
BH CV ECHO MEAS - CONTRAST EF (2CH): 51.7 ML/M^2
BH CV ECHO MEAS - CONTRAST EF 4CH: 57.3 ML/M^2
BH CV ECHO MEAS - EDV(MOD-SP2): 149 ML
BH CV ECHO MEAS - EDV(MOD-SP4): 103 ML
BH CV ECHO MEAS - EDV(TEICH): 200.5 ML
BH CV ECHO MEAS - EF(CUBED): 63.1 %
BH CV ECHO MEAS - EF(MOD-SP2): 51.7 %
BH CV ECHO MEAS - EF(MOD-SP4): 55 %
BH CV ECHO MEAS - EF(TEICH): 53.6 %
BH CV ECHO MEAS - ESV(MOD-SP2): 72 ML
BH CV ECHO MEAS - ESV(MOD-SP4): 44 ML
BH CV ECHO MEAS - ESV(TEICH): 92.9 ML
BH CV ECHO MEAS - FS: 28.3 %
BH CV ECHO MEAS - IVS/LVPW: 1
BH CV ECHO MEAS - IVSD: 1.2 CM
BH CV ECHO MEAS - LAT PEAK E' VEL: 10 CM/SEC
BH CV ECHO MEAS - LV DIASTOLIC VOL/BSA (35-75): 48.3 ML/M^2
BH CV ECHO MEAS - LV MASS(C)D: 337.7 GRAMS
BH CV ECHO MEAS - LV MASS(C)DI: 158.5 GRAMS/M^2
BH CV ECHO MEAS - LV MAX PG: 2.7 MMHG
BH CV ECHO MEAS - LV MEAN PG: 2 MMHG
BH CV ECHO MEAS - LV SYSTOLIC VOL/BSA (12-30): 20.7 ML/M^2
BH CV ECHO MEAS - LV V1 MAX: 81.8 CM/SEC
BH CV ECHO MEAS - LV V1 MEAN: 58.5 CM/SEC
BH CV ECHO MEAS - LV V1 VTI: 20.9 CM
BH CV ECHO MEAS - LVIDD: 6.3 CM
BH CV ECHO MEAS - LVIDS: 4.5 CM
BH CV ECHO MEAS - LVLD AP2: 6.9 CM
BH CV ECHO MEAS - LVLD AP4: 7.2 CM
BH CV ECHO MEAS - LVLS AP2: 6.4 CM
BH CV ECHO MEAS - LVLS AP4: 6.4 CM
BH CV ECHO MEAS - LVOT AREA (M): 2.8 CM^2
BH CV ECHO MEAS - LVOT AREA: 2.8 CM^2
BH CV ECHO MEAS - LVOT DIAM: 1.9 CM
BH CV ECHO MEAS - LVPWD: 1.2 CM
BH CV ECHO MEAS - MED PEAK E' VEL: 20 CM/SEC
BH CV ECHO MEAS - MR ALIAS VEL: 30.8 CM/SEC
BH CV ECHO MEAS - MR ERO: 0.19 CM^2
BH CV ECHO MEAS - MR FLOW RATE: 94.8 CM^3/SEC
BH CV ECHO MEAS - MR MAX PG: 96.7 MMHG
BH CV ECHO MEAS - MR MAX VEL: 491.5 CM/SEC
BH CV ECHO MEAS - MR PISA RADIUS: 0.7 CM
BH CV ECHO MEAS - MR PISA: 3.1 CM^2
BH CV ECHO MEAS - MV A DUR: 0.11 SEC
BH CV ECHO MEAS - MV A MAX VEL: 53.7 CM/SEC
BH CV ECHO MEAS - MV DEC SLOPE: 348 CM/SEC^2
BH CV ECHO MEAS - MV DEC TIME: 0.21 SEC
BH CV ECHO MEAS - MV E MAX VEL: 80.2 CM/SEC
BH CV ECHO MEAS - MV E/A: 1.5
BH CV ECHO MEAS - MV MAX PG: 3 MMHG
BH CV ECHO MEAS - MV MEAN PG: 2 MMHG
BH CV ECHO MEAS - MV P1/2T MAX VEL: 79.6 CM/SEC
BH CV ECHO MEAS - MV P1/2T: 67 MSEC
BH CV ECHO MEAS - MV V2 MAX: 86.8 CM/SEC
BH CV ECHO MEAS - MV V2 MEAN: 59.7 CM/SEC
BH CV ECHO MEAS - MV V2 VTI: 29.2 CM
BH CV ECHO MEAS - MVA P1/2T LCG: 2.8 CM^2
BH CV ECHO MEAS - MVA(P1/2T): 3.3 CM^2
BH CV ECHO MEAS - MVA(VTI): 2 CM^2
BH CV ECHO MEAS - PA MAX PG (FULL): 1.2 MMHG
BH CV ECHO MEAS - PA MAX PG: 1.8 MMHG
BH CV ECHO MEAS - PA V2 MAX: 67.6 CM/SEC
BH CV ECHO MEAS - PULM A REVS DUR: 0.1 SEC
BH CV ECHO MEAS - PULM A REVS VEL: 32.5 CM/SEC
BH CV ECHO MEAS - PULM DIAS VEL: 54.6 CM/SEC
BH CV ECHO MEAS - PULM S/D: 1.2
BH CV ECHO MEAS - PULM SYS VEL: 66.2 CM/SEC
BH CV ECHO MEAS - PVA(V,A): 2.3 CM^2
BH CV ECHO MEAS - PVA(V,D): 2.3 CM^2
BH CV ECHO MEAS - QP/QS: 0.61
BH CV ECHO MEAS - RAP SYSTOLE: 15 MMHG
BH CV ECHO MEAS - RV MAX PG: 0.58 MMHG
BH CV ECHO MEAS - RV MEAN PG: 0 MMHG
BH CV ECHO MEAS - RV V1 MAX: 38.1 CM/SEC
BH CV ECHO MEAS - RV V1 MEAN: 24.4 CM/SEC
BH CV ECHO MEAS - RV V1 VTI: 8.7 CM
BH CV ECHO MEAS - RVOT AREA: 4.2 CM^2
BH CV ECHO MEAS - RVOT DIAM: 2.3 CM
BH CV ECHO MEAS - RVSP: 35 MMHG
BH CV ECHO MEAS - SI(AO): 494.1 ML/M^2
BH CV ECHO MEAS - SI(CUBED): 73.8 ML/M^2
BH CV ECHO MEAS - SI(LVOT): 27.8 ML/M^2
BH CV ECHO MEAS - SI(MOD-SP2): 36.1 ML/M^2
BH CV ECHO MEAS - SI(MOD-SP4): 27.7 ML/M^2
BH CV ECHO MEAS - SI(TEICH): 50.5 ML/M^2
BH CV ECHO MEAS - SUP REN AO DIAM: 2 CM
BH CV ECHO MEAS - SV(AO): 1053 ML
BH CV ECHO MEAS - SV(CUBED): 157.1 ML
BH CV ECHO MEAS - SV(LVOT): 59.3 ML
BH CV ECHO MEAS - SV(MOD-SP2): 77 ML
BH CV ECHO MEAS - SV(MOD-SP4): 59 ML
BH CV ECHO MEAS - SV(RVOT): 36.2 ML
BH CV ECHO MEAS - SV(TEICH): 107.5 ML
BH CV ECHO MEAS - TAPSE (>1.6): 1.3 CM2
BH CV ECHO MEAS - TR MAX VEL: 224 CM/SEC
BH CV XLRA - RV BASE: 4.3 CM
BH CV XLRA - TDI S': 7 CM/SEC
E/E' RATIO: 15
LEFT ATRIUM VOLUME INDEX: 38 ML/M2

## 2017-09-11 PROCEDURE — 93306 TTE W/DOPPLER COMPLETE: CPT | Performed by: INTERNAL MEDICINE

## 2017-09-11 PROCEDURE — 93306 TTE W/DOPPLER COMPLETE: CPT

## 2017-09-12 ENCOUNTER — OFFICE VISIT (OUTPATIENT)
Dept: ENDOCRINOLOGY | Age: 82
End: 2017-09-12

## 2017-09-12 VITALS
HEIGHT: 72 IN | DIASTOLIC BLOOD PRESSURE: 86 MMHG | WEIGHT: 188 LBS | SYSTOLIC BLOOD PRESSURE: 120 MMHG | BODY MASS INDEX: 25.47 KG/M2 | HEART RATE: 69 BPM | OXYGEN SATURATION: 98 %

## 2017-09-12 DIAGNOSIS — E89.3 HYPOPITUITARISM AFTER ADENOMA RESECTION (HCC): ICD-10-CM

## 2017-09-12 DIAGNOSIS — D35.2 BENIGN NEOPLASM OF PITUITARY GLAND (HCC): Primary | ICD-10-CM

## 2017-09-12 DIAGNOSIS — I35.0 NONRHEUMATIC AORTIC VALVE STENOSIS: ICD-10-CM

## 2017-09-12 DIAGNOSIS — D47.2 MGUS (MONOCLONAL GAMMOPATHY OF UNKNOWN SIGNIFICANCE): ICD-10-CM

## 2017-09-12 DIAGNOSIS — I77.9 CAROTID ARTERY DISEASE, UNSPECIFIED LATERALITY (HCC): ICD-10-CM

## 2017-09-12 DIAGNOSIS — E27.49 SECONDARY ADRENAL INSUFFICIENCY (HCC): ICD-10-CM

## 2017-09-12 DIAGNOSIS — E55.9 VITAMIN D DEFICIENCY: ICD-10-CM

## 2017-09-12 DIAGNOSIS — Z95.1 S/P CABG X 3: ICD-10-CM

## 2017-09-12 DIAGNOSIS — E78.5 HYPERLIPIDEMIA, UNSPECIFIED HYPERLIPIDEMIA TYPE: ICD-10-CM

## 2017-09-12 DIAGNOSIS — E11.9 TYPE 2 DIABETES MELLITUS WITHOUT COMPLICATION, WITHOUT LONG-TERM CURRENT USE OF INSULIN (HCC): ICD-10-CM

## 2017-09-12 DIAGNOSIS — R94.8 ABNORMAL RESULTS OF FUNCTION STUDIES OF OTHER ORGANS AND SYSTEMS: ICD-10-CM

## 2017-09-12 DIAGNOSIS — E03.8 SECONDARY HYPOTHYROIDISM: ICD-10-CM

## 2017-09-12 DIAGNOSIS — E23.0 HYPOGONADOTROPIC HYPOGONADISM (HCC): ICD-10-CM

## 2017-09-12 PROCEDURE — 99214 OFFICE O/P EST MOD 30 MIN: CPT | Performed by: INTERNAL MEDICINE

## 2017-09-12 NOTE — PROGRESS NOTES
Subjective   Kieshaus VÍCTOR Taylor is a 82 y.o. male.     HPI Comments: F/u for dm 2,hyperlipidemia,vitmain d def , monoclonal gammopathy , pituitary tumor / not testing bs/ last dm eye exam July 2016/ last dm foot exam 5/2/17 with dr Puga     Diabetes   Hypoglycemia symptoms include nervousness/anxiousness.   Hyperlipidemia     Coronary Artery Disease   Risk factors include hyperlipidemia.      Patient is a 82-year-old male came in for follow-up. 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 He has been on replacement AndroGel 1.62%  pumps once a day, levothyroxine 150 mcg once a day and prednisone 5 mg in the morning and 2-1/2   mg in the evening. He has growth hormone deficiency and was using growth hormone in the past which was discontinued because of arthralgia. He was seen by Dr. Gonzales in 2015 who advised to repeat MRI in 2018. His last eye examination was in 8/17.      He has urinary urgency but no dysuria, dribbling, or retention.   PSA done in June 2016 was normal.      He has hyperlipidemia which is controlled by Zocor 40 mg once a day.  He ran out of Zocor about a month ago.  He denies any myalgia.      He has type 2 diabetes mellitus and is on diet alone. He has lost 22 pounds since 5/17. His last meal was at 9 AM.      He has vitamin D deficiency and has been on vitamin D 1000 units per day and MVI 1 tab/day;      He has IgA monoclonal gammopathy and follows with Dr. Fajardo. He is back on aspirin 81 mg per day.  He denies bone pain.  He is overdue for follow-up with Dr. Fajardo      He has carotid artery disease and had previous carotid and arthrectomy done by Dr. Parr. Carotid ultrasound done in 2015 showed plaque but no significant stenosis.      He has known coronary artery disease and follows with Dr. Figueroa. He denies any chest pain, palpitations or SOB.  He had an echocardiogram done on September 11, 2017 which showed normal left ventricular systolic  "function severe aortic valve stenosis and severe aortic valve regurgitation.    He has moved to assisted living about a year ago after his wife .  He does not cook his own meals.  He has lost weight due to intentionally reducing his food intake.    He denies nausea, vomiting, melena, or hematochezia.  The following portions of the patient's history were reviewed and updated as appropriate: allergies, current medications, past family history, past medical history, past social history, past surgical history and problem list.    Review of Systems   Constitutional: Negative.    HENT: Negative.    Eyes: Negative.    Respiratory: Negative.    Cardiovascular: Negative.    Gastrointestinal: Negative.    Endocrine: Negative.    Genitourinary: Positive for urgency. Negative for difficulty urinating and dysuria.   Musculoskeletal: Negative.    Skin: Negative.    Allergic/Immunologic: Negative.    Neurological: Negative.    Hematological: Bruises/bleeds easily (on aspirin ).   Psychiatric/Behavioral: Positive for sleep disturbance. The patient is nervous/anxious.        Objective       Vitals:    17 1047   BP: 120/86   BP Location: Right arm   Patient Position: Sitting   Cuff Size: Large Adult   Pulse: 69   SpO2: 98%   Weight: 188 lb (85.3 kg)   Height: 72\" (182.9 cm)     Physical Exam   Constitutional: He is oriented to person, place, and time. He appears well-developed and well-nourished. No distress.   HENT:   Head: Normocephalic.   Nose: Nose normal.   Mouth/Throat: No oropharyngeal exudate.   Eyes: Conjunctivae and EOM are normal. Right eye exhibits no discharge. Left eye exhibits no discharge. No scleral icterus.   Neck: Neck supple. No JVD present. No tracheal deviation present. No thyromegaly present.   Cardiovascular: Normal rate and regular rhythm.  Exam reveals no friction rub.    Systolic murmur at base.  No gallop   Pulmonary/Chest: Effort normal and breath sounds normal. No respiratory distress. He has " no wheezes. He has no rales. He exhibits no tenderness.   Abdominal: Soft. Bowel sounds are normal. He exhibits no distension and no mass. There is no tenderness.   Musculoskeletal: Normal range of motion. He exhibits edema (+1 edema on the right leg.). He exhibits no tenderness.   Multiple leg varicosities.  No calf tenderness   Lymphadenopathy:     He has no cervical adenopathy.   Neurological: He is alert and oriented to person, place, and time. He displays normal reflexes.   Intact light touch on distal lower extremities   Skin: Skin is warm and dry.   Psychiatric: He has a normal mood and affect. His behavior is normal.     Hospital Outpatient Visit on 09/11/2017   Component Date Value Ref Range Status   • BSA 09/11/2017 2.1  m^2 Preliminary   • IVSd 09/11/2017 1.2  cm Preliminary   • LVIDd 09/11/2017 6.3  cm Preliminary   • LVIDs 09/11/2017 4.5  cm Preliminary   • LVPWd 09/11/2017 1.2  cm Preliminary   • IVS/LVPW 09/11/2017 1.0   Preliminary   • FS 09/11/2017 28.3  % Preliminary   • EDV(Teich) 09/11/2017 200.5  ml Preliminary   • ESV(Teich) 09/11/2017 92.9  ml Preliminary   • EF(Teich) 09/11/2017 53.6  % Preliminary   • EF(cubed) 09/11/2017 63.1  % Preliminary   • LV mass(C)d 09/11/2017 337.7  grams Preliminary   • LV mass(C)dI 09/11/2017 158.5  grams/m^2 Preliminary   • SV(Teich) 09/11/2017 107.5  ml Preliminary   • SI(Teich) 09/11/2017 50.5  ml/m^2 Preliminary   • SV(cubed) 09/11/2017 157.1  ml Preliminary   • SI(cubed) 09/11/2017 73.8  ml/m^2 Preliminary   • Ao root diam 09/11/2017 3.7  cm Preliminary   • Ao root area 09/11/2017 10.8  cm^2 Preliminary   • ACS 09/11/2017 1.2  cm Preliminary   • LVOT diam 09/11/2017 1.9  cm Preliminary   • LVOT area 09/11/2017 2.8  cm^2 Preliminary   • LVOT area(traced) 09/11/2017 2.8  cm^2 Preliminary   • RVOT diam 09/11/2017 2.3  cm Preliminary   • RVOT area 09/11/2017 4.2  cm^2 Preliminary   • LVLd ap4 09/11/2017 7.2  cm Preliminary   • EDV(MOD-sp4) 09/11/2017 103.0  ml  Preliminary   • LVLs ap4 09/11/2017 6.4  cm Preliminary   • ESV(MOD-sp4) 09/11/2017 44.0  ml Preliminary   • EF(MOD-sp4) 09/11/2017 55  % Preliminary   • LVLd ap2 09/11/2017 6.9  cm Preliminary   • EDV(MOD-sp2) 09/11/2017 149.0  ml Preliminary   • LVLs ap2 09/11/2017 6.4  cm Preliminary   • ESV(MOD-sp2) 09/11/2017 72.0  ml Preliminary   • EF(MOD-sp2) 09/11/2017 51.7  % Preliminary   • SV(MOD-sp4) 09/11/2017 59.0  ml Preliminary   • SI(MOD-sp4) 09/11/2017 27.7  ml/m^2 Preliminary   • SV(MOD-sp2) 09/11/2017 77.0  ml Preliminary   • SI(MOD-sp2) 09/11/2017 36.1  ml/m^2 Preliminary   • Ao root area (BSA corrected) 09/11/2017 1.7   Preliminary   • Ao root area (BSA corrected) 09/11/2017 51.7  ml/m^2 Preliminary   • CONTRAST EF 4CH 09/11/2017 57.3  ml/m^2 Preliminary   • LV Diastolic corrected for BSA 09/11/2017 48.3  ml/m^2 Preliminary   • LV Systolic corrected for BSA 09/11/2017 20.7  ml/m^2 Preliminary   • MV A dur 09/11/2017 0.11  sec Preliminary   • MV E max ana paula 09/11/2017 80.2  cm/sec Preliminary   • MV A max ana paula 09/11/2017 53.7  cm/sec Preliminary   • MV E/A 09/11/2017 1.5   Preliminary   • MV V2 max 09/11/2017 86.8  cm/sec Preliminary   • MV max PG 09/11/2017 3.0  mmHg Preliminary   • MV V2 mean 09/11/2017 59.7  cm/sec Preliminary   • MV mean PG 09/11/2017 2.0  mmHg Preliminary   • MV V2 VTI 09/11/2017 29.2  cm Preliminary   • MVA(VTI) 09/11/2017 2.0  cm^2 Preliminary   • MV P1/2t max ana paula 09/11/2017 79.6  cm/sec Preliminary   • MV P1/2t 09/11/2017 67.0  msec Preliminary   • MVA(P1/2t) 09/11/2017 3.3  cm^2 Preliminary   • MV dec slope 09/11/2017 348.0  cm/sec^2 Preliminary   • MV dec time 09/11/2017 0.21  sec Preliminary   • Ao pk ana paula 09/11/2017 377.0  cm/sec Preliminary   • Ao max PG 09/11/2017 56.9  mmHg Preliminary   • Ao max PG (full) 09/11/2017 54.2  mmHg Preliminary   • Ao V2 mean 09/11/2017 276.0  cm/sec Preliminary   • Ao mean PG 09/11/2017 35.0  mmHg Preliminary   • Ao mean PG (full) 09/11/2017 33.0  mmHg  Preliminary   • Ao V2 VTI 09/11/2017 97.9  cm Preliminary   • GISELE(I,A) 09/11/2017 0.61  cm^2 Preliminary   • GISELE(I,D) 09/11/2017 0.61  cm^2 Preliminary   • GISELE(V,A) 09/11/2017 0.62  cm^2 Preliminary   • GISELE(V,D) 09/11/2017 0.62  cm^2 Preliminary   • AI max cuauhtemoc 09/11/2017 400.0  cm/sec Preliminary   • AI max PG 09/11/2017 64.0  mmHg Preliminary   • AI dec slope 09/11/2017 270.0  cm/sec^2 Preliminary   • AI P1/2t 09/11/2017 433.9  msec Preliminary   • LV V1 max PG 09/11/2017 2.7  mmHg Preliminary   • LV V1 mean PG 09/11/2017 2.0  mmHg Preliminary   • LV V1 max 09/11/2017 81.8  cm/sec Preliminary   • LV V1 mean 09/11/2017 58.5  cm/sec Preliminary   • LV V1 VTI 09/11/2017 20.9  cm Preliminary   • MR max cuauhtemoc 09/11/2017 491.5  cm/sec Preliminary   • MR max PG 09/11/2017 96.7  mmHg Preliminary   • MR PISA 09/11/2017 3.1  cm^2 Preliminary   • MR flow rate 09/11/2017 94.8  cm^3/sec Preliminary   • MR ERO 09/11/2017 0.19  cm^2 Preliminary   • MR PISA radius 09/11/2017 0.7  cm Preliminary   • MR alias cuauhtemoc 09/11/2017 30.8  cm/sec Preliminary   • SV(Ao) 09/11/2017 1053  ml Preliminary   • SI(Ao) 09/11/2017 494.1  ml/m^2 Preliminary   • SV(LVOT) 09/11/2017 59.3  ml Preliminary   • SV(RVOT) 09/11/2017 36.2  ml Preliminary   • SI(LVOT) 09/11/2017 27.8  ml/m^2 Preliminary   • PA V2 max 09/11/2017 67.6  cm/sec Preliminary   • PA max PG 09/11/2017 1.8  mmHg Preliminary   • PA max PG (full) 09/11/2017 1.2  mmHg Preliminary   • BH CV ECHO SARATH - PVA(V,A) 09/11/2017 2.3  cm^2 Preliminary   • BH CV ECHO SARATH - PVA(V,D) 09/11/2017 2.3  cm^2 Preliminary   • RV V1 max PG 09/11/2017 0.58  mmHg Preliminary   • RV V1 mean PG 09/11/2017 0  mmHg Preliminary   • RV V1 max 09/11/2017 38.1  cm/sec Preliminary   • RV V1 mean 09/11/2017 24.4  cm/sec Preliminary   • RV V1 VTI 09/11/2017 8.7  cm Preliminary   • TR max cuauhtemoc 09/11/2017 224.0  cm/sec Preliminary   • Pulm Sys Cuauhtemoc 09/11/2017 66.2  cm/sec Preliminary   • Pulm Barnhart Cuauhtemoc 09/11/2017 54.6  cm/sec  Preliminary   • Pulm S/D 09/11/2017 1.2   Preliminary   • Qp/Qs 09/11/2017 0.61   Preliminary   • Pulm A Revs Dur 09/11/2017 0.1  sec Preliminary   • Pulm A Revs Cuauhtemoc 09/11/2017 32.5  cm/sec Preliminary   • MVA P1/2T LCG 09/11/2017 2.8  cm^2 Preliminary   •  CV ECHO SARATH - BZI_BMI 09/11/2017 27.1  kilograms/m^2 Preliminary   •  CV ECHO SARATH - BSA(H[place]OCK) 09/11/2017 2.2  m^2 Preliminary   •  CV ECHO SARATH - BZI_METRIC_WEIGHT 09/11/2017 90.7  kg Preliminary   •  CV ECHO SARATH - BZI_METRIC_HEIGHT 09/11/2017 182.9  cm Preliminary   • E/E' ratio 09/11/2017 15.0   Final   • LA Volume Index 09/11/2017 38.0  mL/m2 Final   • Lat Peak E' Cuauhtemoc 09/11/2017 10.0  cm/sec Final   • Med Peak E' Cuauhtemoc 09/11/2017 20.00  cm/sec Final   • TDI S' 09/11/2017 7.00  cm/sec Final   • RV Base 09/11/2017 4.30  cm Final   • RAP systole 09/11/2017 15  mmHg Final   • RVSP(TR) 09/11/2017 35  mmHg Final   • Sup Jef Ao Diam 09/11/2017 2.00  cm Final   • TAPSE (>1.6) 09/11/2017 1.30  cm2 Final   • AV valve area 09/11/2017 5.5  cm2 Final     Assessment/Plan   Ophus was seen today for diabetes, hyperlipidemia, vitamin d deficiency and coronary artery disease.    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  Check free T4, testosterone, and PSA.  Restart Zocor.  Check lipid profile.  Check urine microalbumin and urinalysis.  Check vitamin D.  Follow-up with Dr. Figueroa with regards to aortic stenosis.  Flu vaccine next month  Patient advised to make a follow-up appointment with Dr. Fajardo.    Send copy of my notes and labs to Dr. Figueroa and Dr. Fajardo.    RTC 3 mos.

## 2017-09-13 LAB
25(OH)D3+25(OH)D2 SERPL-MCNC: 59.7 NG/ML (ref 30–100)
ALBUMIN SERPL-MCNC: 4.3 G/DL (ref 3.5–5.2)
ALBUMIN/CREAT UR: 7.1 MG/G CREAT (ref 0–30)
ALBUMIN/GLOB SERPL: 1.3 G/DL
ALP SERPL-CCNC: 81 U/L (ref 39–117)
ALT SERPL-CCNC: 22 U/L (ref 1–41)
APPEARANCE UR: CLEAR
AST SERPL-CCNC: 22 U/L (ref 1–40)
BILIRUB SERPL-MCNC: 1.1 MG/DL (ref 0.1–1.2)
BILIRUB UR QL STRIP: NEGATIVE
BUN SERPL-MCNC: 23 MG/DL (ref 8–23)
BUN/CREAT SERPL: 15.4 (ref 7–25)
CALCIUM SERPL-MCNC: 9.5 MG/DL (ref 8.6–10.5)
CHLORIDE SERPL-SCNC: 102 MMOL/L (ref 98–107)
CHOLEST SERPL-MCNC: 121 MG/DL (ref 0–200)
CO2 SERPL-SCNC: 28.2 MMOL/L (ref 22–29)
COLOR UR: ABNORMAL
CREAT SERPL-MCNC: 1.49 MG/DL (ref 0.76–1.27)
CREAT UR-MCNC: 193.4 MG/DL
GLOBULIN SER CALC-MCNC: 3.2 GM/DL
GLUCOSE SERPL-MCNC: 94 MG/DL (ref 65–99)
GLUCOSE UR QL: NEGATIVE
HBA1C MFR BLD: 5.98 % (ref 4.8–5.6)
HDLC SERPL-MCNC: 41 MG/DL (ref 40–60)
HGB UR QL STRIP: NEGATIVE
KETONES UR QL STRIP: ABNORMAL
LDLC SERPL CALC-MCNC: 59 MG/DL (ref 0–100)
LEUKOCYTE ESTERASE UR QL STRIP: NEGATIVE
MICROALBUMIN UR-MCNC: 13.8 UG/ML
NITRITE UR QL STRIP: NEGATIVE
PH UR STRIP: 6.5 [PH] (ref 5–8)
POTASSIUM SERPL-SCNC: 5.3 MMOL/L (ref 3.5–5.2)
PROT SERPL-MCNC: 7.5 G/DL (ref 6–8.5)
PROT UR QL STRIP: ABNORMAL
PSA SERPL-MCNC: 2.3 NG/ML (ref 0–4)
SODIUM SERPL-SCNC: 144 MMOL/L (ref 136–145)
SP GR UR: 1.02 (ref 1–1.03)
T4 FREE SERPL-MCNC: 1.53 NG/DL (ref 0.93–1.7)
TESTOST SERPL-MCNC: 547 NG/DL (ref 264–916)
TRIGL SERPL-MCNC: 107 MG/DL (ref 0–150)
UROBILINOGEN UR STRIP-MCNC: ABNORMAL MG/DL
VLDLC SERPL CALC-MCNC: 21.4 MG/DL (ref 5–40)

## 2017-09-19 ENCOUNTER — TELEPHONE (OUTPATIENT)
Dept: CARDIOLOGY | Facility: CLINIC | Age: 82
End: 2017-09-19

## 2017-09-19 NOTE — TELEPHONE ENCOUNTER
----- Message from Bony Figueroa MD sent at 9/19/2017 11:33 AM EDT -----  I left a message with him we've no that his aortic valve is pretty bad he's been asymptomatic he also has kind of declined healthwise overall and I think written one a be conservative in the care of him

## 2017-10-16 ENCOUNTER — TELEPHONE (OUTPATIENT)
Dept: CARDIOLOGY | Facility: CLINIC | Age: 82
End: 2017-10-16

## 2017-10-16 NOTE — TELEPHONE ENCOUNTER
"Patient called and left a message saying he needs an appointment.  Patient stated he has been having problems with heart pills.  He thinks he needs his heart checked out. \"My chest nieves hurts, not a whole lot, but quite a bit.\"  Intermittent. Asked if it is pressure, \"I think so.\"  Having more shortness of breath than it has been.  Feels tired all the time.  Hasn't checked blood pressure.  He moved into a \"[facility]\" apartments.    Patient states he could never get in touch with him regarding his test results because he didn't have an answering machine. Your first available is a hospital follow up at the end of November.  His new number is [phone].    Thanks  Carmen    Did tell patient to go to the ER if symptoms worsen    "

## 2017-10-17 NOTE — TELEPHONE ENCOUNTER
Zuleyma I have called this number several times. Does not have a voice mail set up. Cannot leave a message   [facility] can you all please keep trying to get this patient. Dr. Figueroa said he would see him on Thursday 10/19/17 at 2:00

## 2017-10-19 ENCOUNTER — OFFICE VISIT (OUTPATIENT)
Dept: CARDIOLOGY | Facility: CLINIC | Age: 82
End: 2017-10-19

## 2017-10-19 ENCOUNTER — LAB (OUTPATIENT)
Dept: LAB | Facility: HOSPITAL | Age: 82
End: 2017-10-19

## 2017-10-19 VITALS
HEIGHT: 72 IN | SYSTOLIC BLOOD PRESSURE: 122 MMHG | BODY MASS INDEX: 26.47 KG/M2 | WEIGHT: 195.4 LBS | HEART RATE: 65 BPM | DIASTOLIC BLOOD PRESSURE: 80 MMHG

## 2017-10-19 DIAGNOSIS — I35.0 NONRHEUMATIC AORTIC VALVE STENOSIS: ICD-10-CM

## 2017-10-19 DIAGNOSIS — I35.1 NONRHEUMATIC AORTIC VALVE INSUFFICIENCY: ICD-10-CM

## 2017-10-19 DIAGNOSIS — Z95.1 S/P CABG X 3: Primary | ICD-10-CM

## 2017-10-19 DIAGNOSIS — Z95.1 S/P CABG X 3: ICD-10-CM

## 2017-10-19 LAB
ALBUMIN SERPL-MCNC: 4 G/DL (ref 3.5–5.2)
ALBUMIN/GLOB SERPL: 1.1 G/DL
ALP SERPL-CCNC: 104 U/L (ref 39–117)
ALT SERPL W P-5'-P-CCNC: 20 U/L (ref 1–41)
ANION GAP SERPL CALCULATED.3IONS-SCNC: 14.6 MMOL/L
AST SERPL-CCNC: 23 U/L (ref 1–40)
BASOPHILS # BLD AUTO: 0.01 10*3/MM3 (ref 0–0.2)
BASOPHILS NFR BLD AUTO: 0.2 % (ref 0–1.5)
BILIRUB SERPL-MCNC: 0.7 MG/DL (ref 0.1–1.2)
BUN BLD-MCNC: 15 MG/DL (ref 8–23)
BUN/CREAT SERPL: 9.8 (ref 7–25)
CALCIUM SPEC-SCNC: 8.9 MG/DL (ref 8.6–10.5)
CHLORIDE SERPL-SCNC: 104 MMOL/L (ref 98–107)
CO2 SERPL-SCNC: 25.4 MMOL/L (ref 22–29)
CREAT BLD-MCNC: 1.53 MG/DL (ref 0.76–1.27)
DEPRECATED RDW RBC AUTO: 57.9 FL (ref 37–54)
EOSINOPHIL # BLD AUTO: 0.25 10*3/MM3 (ref 0–0.7)
EOSINOPHIL NFR BLD AUTO: 3.8 % (ref 0.3–6.2)
ERYTHROCYTE [DISTWIDTH] IN BLOOD BY AUTOMATED COUNT: 15.4 % (ref 11.5–14.5)
GFR SERPL CREATININE-BSD FRML MDRD: 44 ML/MIN/1.73
GLOBULIN UR ELPH-MCNC: 3.7 GM/DL
GLUCOSE BLD-MCNC: 100 MG/DL (ref 65–99)
HCT VFR BLD AUTO: 48.9 % (ref 40.4–52.2)
HGB BLD-MCNC: 16 G/DL (ref 13.7–17.6)
IMM GRANULOCYTES # BLD: 0 10*3/MM3 (ref 0–0.03)
IMM GRANULOCYTES NFR BLD: 0 % (ref 0–0.5)
LYMPHOCYTES # BLD AUTO: 1.14 10*3/MM3 (ref 0.9–4.8)
LYMPHOCYTES NFR BLD AUTO: 17.5 % (ref 19.6–45.3)
MCH RBC QN AUTO: 33 PG (ref 27–32.7)
MCHC RBC AUTO-ENTMCNC: 32.7 G/DL (ref 32.6–36.4)
MCV RBC AUTO: 100.8 FL (ref 79.8–96.2)
MONOCYTES # BLD AUTO: 0.42 10*3/MM3 (ref 0.2–1.2)
MONOCYTES NFR BLD AUTO: 6.4 % (ref 5–12)
NEUTROPHILS # BLD AUTO: 4.7 10*3/MM3 (ref 1.9–8.1)
NEUTROPHILS NFR BLD AUTO: 72.1 % (ref 42.7–76)
NT-PROBNP SERPL-MCNC: 4858 PG/ML (ref 0–1800)
PLATELET # BLD AUTO: 124 10*3/MM3 (ref 140–500)
PMV BLD AUTO: 13.1 FL (ref 6–12)
POTASSIUM BLD-SCNC: 4.7 MMOL/L (ref 3.5–5.2)
PROT SERPL-MCNC: 7.7 G/DL (ref 6–8.5)
RBC # BLD AUTO: 4.85 10*6/MM3 (ref 4.6–6)
SODIUM BLD-SCNC: 144 MMOL/L (ref 136–145)
WBC NRBC COR # BLD: 6.52 10*3/MM3 (ref 4.5–10.7)

## 2017-10-19 PROCEDURE — 83880 ASSAY OF NATRIURETIC PEPTIDE: CPT | Performed by: INTERNAL MEDICINE

## 2017-10-19 PROCEDURE — 99214 OFFICE O/P EST MOD 30 MIN: CPT | Performed by: INTERNAL MEDICINE

## 2017-10-19 PROCEDURE — 36415 COLL VENOUS BLD VENIPUNCTURE: CPT | Performed by: INTERNAL MEDICINE

## 2017-10-19 PROCEDURE — 85025 COMPLETE CBC W/AUTO DIFF WBC: CPT

## 2017-10-19 PROCEDURE — 93000 ELECTROCARDIOGRAM COMPLETE: CPT | Performed by: INTERNAL MEDICINE

## 2017-10-19 PROCEDURE — 80053 COMPREHEN METABOLIC PANEL: CPT | Performed by: INTERNAL MEDICINE

## 2017-10-19 RX ORDER — POTASSIUM CHLORIDE 20 MEQ/1
20 TABLET, EXTENDED RELEASE ORAL DAILY
Qty: 90 TABLET | Refills: 3 | Status: SHIPPED | OUTPATIENT
Start: 2017-10-19 | End: 2018-03-05

## 2017-10-19 RX ORDER — FUROSEMIDE 40 MG/1
40 TABLET ORAL DAILY
Qty: 90 TABLET | Refills: 3 | Status: SHIPPED | OUTPATIENT
Start: 2017-10-19

## 2017-10-19 NOTE — PROGRESS NOTES
Date of Office Visit: 10/19/2017  Encounter Provider: Bony Figueroa MD  Place of Service: [facility] CARDIOLOGY  Patient Name: Jesse Taylor  :1935  [id number]    Chief Complaint   Patient presents with   • Chest Pain   • Shortness of Breath   • Fatigue   :     HPI: Jesse Taylor is a 82 y.o. male  He has had a prior CABG , taking care of him for a long time. He has primary pituitary failure and has been on hormone replacement. He has not been having much in the way of chest pain. He says he gets some nervousness. Sadly, his wife  last year. He has lost about 40 pounds, he says on purpose. He is now in assisted living. He walks with a cane. He has some shortness of breath at times but no PND, no orthopnea. He has a lot of edema in both legs. No palpitations. He does not really have anything that sounds like angina, although at times he will take a nitroglycerin because he feels like he is nervous, he says.         Past Medical History:   Diagnosis Date   • Aortic stenosis      mod to severe with GISELE of 1 on echo;  severe AS 0.75; nl LVSF; severe AI; BNP nl   • Atherosclerotic heart disease of native coronary artery without angina pectoris    • Basal cell carcinoma of skin 2014    behind ear   • Benign prostatic hypertrophy    • Bony sclerosis    • Claustrophobia    • Disease of thyroid gland    • Edema    • Glaucoma    • Growth hormone deficiency    • Hematoma of arm     resolved 2015   • 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    • Hyperlipidemia    • Hypertension    • Hypogonadism, male    • Hypopituitarism    • IgA monoclonal gammopathy    • Lacunar stroke    • Low testosterone    • Monoclonal gammopathy of undetermined significance    • Obesity    • 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[employer]     Social History Main Topics   • Smoking status: Former Smoker   • Smokeless tobacco: Not on file      Comment: caffeine use   • Alcohol use No   • Drug use: No   • Sexual activity: Not on file     Other Topics Concern   • Not on file     Social History Narrative       Family History   Problem Relation Age of Onset   • Cancer Sister      Brain cancer   • Asthma Son    • Allergy (severe) Son    • Heart disease Mother    • Brain cancer Sister        Review of Systems   Constitution: Positive for weight loss. Negative for decreased appetite, fever and malaise/fatigue.   HENT: Negative for nosebleeds.    Eyes: Negative for double vision.   Cardiovascular: Positive for leg swelling. Negative for chest pain, claudication, cyanosis, dyspnea on exertion, irregular heartbeat, near-syncope, orthopnea, palpitations, paroxysmal nocturnal dyspnea and syncope.   Respiratory: Negative for cough, hemoptysis and shortness of breath.    Hematologic/Lymphatic: Negative for bleeding problem.   Skin: Negative for rash.   Musculoskeletal: Negative for falls and myalgias.   Gastrointestinal: Negative for hematochezia, jaundice, melena, nausea and vomiting.   Genitourinary: Negative for hematuria.   Neurological:  "Negative for dizziness and seizures.   Psychiatric/Behavioral: Negative for altered mental status and memory loss.       Allergies   Allergen Reactions   • Somatropin Other (See Comments)     ARTHRALGIA         Current Outpatient Prescriptions:   •  aspirin  MG tablet, Take 325 mg by mouth Every 6 (Six) Hours As Needed., Disp: , Rfl:   •  bimatoprost (LUMIGAN) 0.01 % ophthalmic drops, Apply 1 drop to eye nightly. Both eyes., Disp: , Rfl:   •  brimonidine (ALPHAGAN P) 0.1 % solution ophthalmic solution, Apply 1 drop to eye., Disp: , Rfl:   •  Cholecalciferol (VITAMIN D3) 1000 UNITS capsule, Take  by mouth., Disp: , Rfl:   •  levothyroxine (SYNTHROID, LEVOTHROID) 150 MCG tablet, TAKE 1 TABLET DAILY, Disp: 90 tablet, Rfl: 1  •  Multiple Vitamin (MULTI VITAMIN DAILY PO), Take 1 tablet by mouth daily., Disp: , Rfl:   •  nitroglycerin (NITROSTAT) 0.4 MG SL tablet, 1 under the tongue as needed for angina, may repeat q5mins for up three doses, Disp: 25 tablet, Rfl: 0  •  simvastatin (ZOCOR) 40 MG tablet, TAKE ONE TABLET BY MOUTH EVERY EVENING, Disp: 90 tablet, Rfl: 2  •  Testosterone 20.25 MG/ACT (1.62%) gel, Apply 4 pump presses one time daily as directed, Disp: 450 g, Rfl: 1  •  furosemide (LASIX) 40 MG tablet, Take 1 tablet by mouth Daily., Disp: 90 tablet, Rfl: 3  •  potassium chloride (K-DUR,KLOR-CON) 20 MEQ CR tablet, Take 1 tablet by mouth Daily., Disp: 90 tablet, Rfl: 3     Objective:     Vitals:    10/19/17 1411   BP: 122/80   Pulse: 65   Weight: 195 lb 6.4 oz (88.6 kg)   Height: 72\" (182.9 cm)     Body mass index is 26.5 kg/(m^2).    Physical Exam   Constitutional: He is oriented to person, place, and time. He appears well-developed and well-nourished.   HENT:   Head: Normocephalic.   Eyes: No scleral icterus.   Neck: No JVD present. No thyromegaly present.   Cardiovascular: Normal rate and regular rhythm.  Exam reveals no gallop and no friction rub.    Murmur heard.   Crescendo-decrescendo mid to late " systolic murmur is present with a grade of 3/6   High-pitched blowing decrescendo early diastolic murmur is present with a grade of 2/6  at the upper right sternal border radiating to the apex  Pulmonary/Chest: Effort normal and breath sounds normal. He has no wheezes. He has no rales.   Abdominal: Soft. There is no hepatosplenomegaly. There is no tenderness.   Musculoskeletal: Normal range of motion. He exhibits edema (+2 B).   Lymphadenopathy:     He has no cervical adenopathy.   Neurological: He is alert and oriented to person, place, and time.   Skin: Skin is warm and dry. No rash noted.   Psychiatric: He has a normal mood and affect.         ECG 12 Lead  Date/Time: 10/19/2017 3:23 PM  Performed by: CARLOS HEMPHILL  Authorized by: CARLOS HEMPHILL   Comparison: compared with previous ECG   Similar to previous ECG  Rhythm: sinus rhythm  Ectopy: infrequent PVCs  Conduction: 1st degree  Q waves: II, III, aVF, V3, V4, V5 and V6  Clinical impression: abnormal ECG             Assessment:       Diagnosis Plan   1. S/P CABG x 3  Comprehensive Metabolic Panel    CBC & Differential    proBNP   2. Nonrheumatic aortic valve stenosis  Comprehensive Metabolic Panel    CBC & Differential    proBNP   3. Nonrheumatic aortic valve insufficiency  Comprehensive Metabolic Panel    CBC & Differential    proBNP          Plan:       I have concerns with how he is doing. He has severe AS, severe AI. They felt like his LV function was still normal, but I think it looks to me like it is a little bit reduced. The problem is I think he is having a significant decline in his overall health. He is losing weight. He is mildly disheveled, like his clothes are not really clean. There is food all over his pants. He walks with a cane. Although he does have severe AS and severe AI, I am not sure if fixing that would make him do better. I really do not think he would be a surgical candidate at any point. So at this point, I am going to try to manage  him medically. I am going to add some Lasix to his regimen. I would like him to get some blood work checked. His edema seems to be out of proportion for just heart failure because he does not have any real symptoms of heart failure. I am going to put him on some potassium. I am going to have him see Idalia Au PA-C in a month. He will see me in 3 months.        As always, it has been a pleasure to participate in your patient's care.      Sincerely,       Bony Figueroa MD

## 2017-11-22 ENCOUNTER — TELEPHONE (OUTPATIENT)
Dept: CARDIOLOGY | Facility: CLINIC | Age: 82
End: 2017-11-22

## 2017-11-22 ENCOUNTER — OFFICE VISIT (OUTPATIENT)
Dept: CARDIOLOGY | Facility: CLINIC | Age: 82
End: 2017-11-22

## 2017-11-22 ENCOUNTER — HOSPITAL ENCOUNTER (OUTPATIENT)
Dept: CARDIOLOGY | Facility: HOSPITAL | Age: 82
Discharge: HOME | End: 2017-11-22
Admitting: PHYSICIAN ASSISTANT

## 2017-11-22 VITALS
HEIGHT: 72 IN | SYSTOLIC BLOOD PRESSURE: 126 MMHG | HEART RATE: 68 BPM | DIASTOLIC BLOOD PRESSURE: 82 MMHG | BODY MASS INDEX: 23.98 KG/M2 | OXYGEN SATURATION: 99 % | WEIGHT: 177 LBS

## 2017-11-22 DIAGNOSIS — I35.1 NONRHEUMATIC AORTIC VALVE INSUFFICIENCY: Primary | ICD-10-CM

## 2017-11-22 DIAGNOSIS — I25.10 CORONARY ARTERY DISEASE INVOLVING NATIVE CORONARY ARTERY OF NATIVE HEART WITHOUT ANGINA PECTORIS: ICD-10-CM

## 2017-11-22 DIAGNOSIS — R63.4 WEIGHT LOSS: Primary | ICD-10-CM

## 2017-11-22 DIAGNOSIS — I35.0 NONRHEUMATIC AORTIC VALVE STENOSIS: ICD-10-CM

## 2017-11-22 LAB
ALBUMIN SERPL-MCNC: 3.1 G/DL (ref 3.5–5.2)
ALBUMIN/GLOB SERPL: 0.9 G/DL
ALP SERPL-CCNC: 100 U/L (ref 39–117)
ALT SERPL W P-5'-P-CCNC: 9 U/L (ref 1–41)
ANION GAP SERPL CALCULATED.3IONS-SCNC: 13.7 MMOL/L
AST SERPL-CCNC: 15 U/L (ref 1–40)
BASOPHILS # BLD AUTO: 0.03 10*3/MM3 (ref 0–0.2)
BASOPHILS NFR BLD AUTO: 0.5 % (ref 0–1.5)
BILIRUB SERPL-MCNC: 0.8 MG/DL (ref 0.1–1.2)
BUN BLD-MCNC: 17 MG/DL (ref 8–23)
BUN/CREAT SERPL: 11.1 (ref 7–25)
CALCIUM SPEC-SCNC: 8.8 MG/DL (ref 8.6–10.5)
CHLORIDE SERPL-SCNC: 101 MMOL/L (ref 98–107)
CO2 SERPL-SCNC: 25.3 MMOL/L (ref 22–29)
CREAT BLD-MCNC: 1.53 MG/DL (ref 0.76–1.27)
DEPRECATED RDW RBC AUTO: 51.8 FL (ref 37–54)
EOSINOPHIL # BLD AUTO: 0.66 10*3/MM3 (ref 0–0.7)
EOSINOPHIL NFR BLD AUTO: 10.5 % (ref 0.3–6.2)
ERYTHROCYTE [DISTWIDTH] IN BLOOD BY AUTOMATED COUNT: 14.1 % (ref 11.5–14.5)
GFR SERPL CREATININE-BSD FRML MDRD: 44 ML/MIN/1.73
GLOBULIN UR ELPH-MCNC: 3.5 GM/DL
GLUCOSE BLD-MCNC: 83 MG/DL (ref 65–99)
HCT VFR BLD AUTO: 43.1 % (ref 40.4–52.2)
HGB BLD-MCNC: 14 G/DL (ref 13.7–17.6)
IMM GRANULOCYTES # BLD: 0 10*3/MM3 (ref 0–0.03)
IMM GRANULOCYTES NFR BLD: 0 % (ref 0–0.5)
LYMPHOCYTES # BLD AUTO: 1.36 10*3/MM3 (ref 0.9–4.8)
LYMPHOCYTES NFR BLD AUTO: 21.7 % (ref 19.6–45.3)
MCH RBC QN AUTO: 32.7 PG (ref 27–32.7)
MCHC RBC AUTO-ENTMCNC: 32.5 G/DL (ref 32.6–36.4)
MCV RBC AUTO: 100.7 FL (ref 79.8–96.2)
MONOCYTES # BLD AUTO: 1 10*3/MM3 (ref 0.2–1.2)
MONOCYTES NFR BLD AUTO: 15.9 % (ref 5–12)
NEUTROPHILS # BLD AUTO: 3.22 10*3/MM3 (ref 1.9–8.1)
NEUTROPHILS NFR BLD AUTO: 51.4 % (ref 42.7–76)
NT-PROBNP SERPL-MCNC: 3628 PG/ML (ref 0–1800)
PLATELET # BLD AUTO: 136 10*3/MM3 (ref 140–500)
PMV BLD AUTO: 12 FL (ref 6–12)
POTASSIUM BLD-SCNC: 4.1 MMOL/L (ref 3.5–5.2)
PROT SERPL-MCNC: 6.6 G/DL (ref 6–8.5)
RBC # BLD AUTO: 4.28 10*6/MM3 (ref 4.6–6)
SODIUM BLD-SCNC: 140 MMOL/L (ref 136–145)
WBC NRBC COR # BLD: 6.27 10*3/MM3 (ref 4.5–10.7)

## 2017-11-22 PROCEDURE — 83880 ASSAY OF NATRIURETIC PEPTIDE: CPT | Performed by: PHYSICIAN ASSISTANT

## 2017-11-22 PROCEDURE — 36415 COLL VENOUS BLD VENIPUNCTURE: CPT

## 2017-11-22 PROCEDURE — 80053 COMPREHEN METABOLIC PANEL: CPT | Performed by: PHYSICIAN ASSISTANT

## 2017-11-22 PROCEDURE — 93000 ELECTROCARDIOGRAM COMPLETE: CPT | Performed by: PHYSICIAN ASSISTANT

## 2017-11-22 PROCEDURE — 85025 COMPLETE CBC W/AUTO DIFF WBC: CPT | Performed by: PHYSICIAN ASSISTANT

## 2017-11-22 PROCEDURE — 99214 OFFICE O/P EST MOD 30 MIN: CPT | Performed by: PHYSICIAN ASSISTANT

## 2017-11-22 NOTE — ADDENDUM NOTE
Encounter addended by: Goldie Francois RN on: 11/22/2017  2:10 PM<BR>     Actions taken: Diagnosis association updated, Child order released for a procedure order, Charge Capture section accepted

## 2017-11-22 NOTE — PROGRESS NOTES
Date of Office Visit: 2017  Encounter Provider: JESSICA Bush  Place of Service: [facility] CARDIOLOGY  Patient Name: Jesse Taylor  :1935    Chief Complaint   Patient presents with   • Coronary Artery Disease     1 month follow up   :     HPI: Jesse Taylor is a 82 y.o. male, new to me, who presents today for follow-up.  Old records have been obtained and reviewed by me.  He is a patient of Dr. Figueroa's with a past medical history significant for coronary artery disease and aortic stenosis.  He had a CABG in , and he has known severe aortic stenosis and severe aortic insufficiency.  He was last in our office to see Dr. Figueroa on 10/19/2017.  At that visit, Dr. Figueroa was concerned about his overall health.  He felt that he was in decline.  He was losing weight, disheveled, and walking with a cane.  He had a lot of swelling in his legs that Dr. Figueroa felt was out of proportion to his heart disease.  He started him on Lasix and potassium, and check some blood work.  That day his creatinine was 1.53, and his proBNP was elevated at 4800.  Recommendations were for him to follow up with me in one month and with Dr. Figueroa in 3 months.  He is here today for his 1 month follow-up.   Today he states that he does not like the medications that Dr. Figueroa prescribed for him did not help him.  He states that they made him worse.  Evidently he went into the emergency room at another hospital and was told that he was dehydrated.  He is really a poor historian, and its kind of hard to get a straight story out of him.  He states that he is no longer taking his Lasix and potassium, however when I ran into his son in the lobby his son thinks that he is still on his medications.  He still is disheveled today, and very unsteady on his feet.  He has lost another 20 pounds.  He lives at home with his son, and his son states that he is trying to get him to eat but he really  does not want to eat much.  His chest pain and shortness of breath are stable.      Past Medical History:   Diagnosis Date   • Aortic stenosis     7/12 mod to severe with GISELE of 1 on echo; 11/14 severe AS 0.75; nl LVSF; severe AI; BNP nl   • Atherosclerotic heart disease of native coronary artery without angina pectoris    • Basal cell carcinoma of skin 09/24/2014    behind ear   • Benign prostatic hypertrophy    • Bony sclerosis    • Claustrophobia    • Disease of thyroid gland    • Edema    • Glaucoma    • Growth hormone deficiency    • Hematoma of arm     resolved 24Nov2015   • 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    • Hyperlipidemia    • Hypertension    • Hypogonadism, male    • Hypopituitarism    • IgA monoclonal gammopathy    • Lacunar stroke    • Low testosterone    • Monoclonal gammopathy of undetermined significance    • Obesity    • 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[employer]     Social History Main Topics   • Smoking status: Former Smoker   • Smokeless tobacco: Never Used      Comment: caffeine use   • Alcohol use No   • Drug use: No    • Sexual activity: Defer     Other Topics Concern   • Not on file     Social History Narrative       Family History   Problem Relation Age of Onset   • Cancer Sister      Brain cancer   • Asthma Son    • Allergy (severe) Son    • Heart disease Mother    • No Known Problems Brother    • No Known Problems Sister    • No Known Problems Sister    • No Known Problems Sister    • No Known Problems Sister    • No Known Problems Sister    • Brain cancer Sister    • No Known Problems Sister    • No Known Problems Sister    • No Known Problems Sister    • No Known Problems Brother        Review of Systems   Constitution: Negative for chills, fever, malaise/fatigue, weight gain and weight loss.   HENT: Negative for ear pain, hearing loss, nosebleeds and sore throat.    Eyes: Negative for double vision, pain and visual disturbance.   Cardiovascular: Positive for chest pain. Negative for dyspnea on exertion, irregular heartbeat, leg swelling, near-syncope, orthopnea, palpitations, paroxysmal nocturnal dyspnea and syncope.   Respiratory: Positive for shortness of breath. Negative for cough, sleep disturbances due to breathing, snoring and wheezing.    Endocrine: Negative for cold intolerance, heat intolerance and polyuria.   Skin: Negative for itching and rash.   Musculoskeletal: Negative for joint pain, joint swelling and myalgias.   Gastrointestinal: Negative for abdominal pain, diarrhea, melena, nausea and vomiting.   Genitourinary: Negative for frequency, hematuria and hesitancy.   Neurological: Negative for excessive daytime sleepiness, headaches, light-headedness, numbness, paresthesias and seizures.   Psychiatric/Behavioral: Negative for altered mental status and depression.   Allergic/Immunologic: Negative.    All other systems reviewed and are negative.      Allergies   Allergen Reactions   • Somatropin Other (See Comments)     ARTHRALGIA         Current Outpatient Prescriptions:   •  aspirin  MG tablet, Take 325  "mg by mouth Every 6 (Six) Hours As Needed., Disp: , Rfl:   •  bimatoprost (LUMIGAN) 0.01 % ophthalmic drops, Apply 1 drop to eye nightly. Both eyes., Disp: , Rfl:   •  brimonidine (ALPHAGAN P) 0.1 % solution ophthalmic solution, Apply 1 drop to eye., Disp: , Rfl:   •  Cholecalciferol (VITAMIN D3) 1000 UNITS capsule, Take  by mouth., Disp: , Rfl:   •  furosemide (LASIX) 40 MG tablet, Take 1 tablet by mouth Daily., Disp: 90 tablet, Rfl: 3  •  levothyroxine (SYNTHROID, LEVOTHROID) 150 MCG tablet, TAKE 1 TABLET DAILY, Disp: 90 tablet, Rfl: 1  •  Multiple Vitamin (MULTI VITAMIN DAILY PO), Take 1 tablet by mouth daily., Disp: , Rfl:   •  nitroglycerin (NITROSTAT) 0.4 MG SL tablet, 1 under the tongue as needed for angina, may repeat q5mins for up three doses, Disp: 25 tablet, Rfl: 0  •  potassium chloride (K-DUR,KLOR-CON) 20 MEQ CR tablet, Take 1 tablet by mouth Daily., Disp: 90 tablet, Rfl: 3  •  simvastatin (ZOCOR) 40 MG tablet, TAKE ONE TABLET BY MOUTH EVERY EVENING, Disp: 90 tablet, Rfl: 2  •  Testosterone 20.25 MG/ACT (1.62%) gel, Apply 4 pump presses one time daily as directed, Disp: 450 g, Rfl: 1     Objective:     Vitals:    11/22/17 1313 11/22/17 1319   BP: 122/76 126/82   BP Location: Right arm Left arm   Pulse: 68    SpO2: 99%    Weight: 177 lb (80.3 kg)    Height: 72\" (182.9 cm)      Body mass index is 24.01 kg/(m^2).    PHYSICAL EXAM:    Physical Exam   Constitutional: He is oriented to person, place, and time. He appears well-developed and well-nourished. He appears ill. No distress.   HENT:   Head: Normocephalic and atraumatic.   Eyes: Pupils are equal, round, and reactive to light.   Neck: No JVD present. No thyromegaly present.   Cardiovascular: Normal rate, regular rhythm, normal heart sounds and intact distal pulses.    No murmur heard.  Pulmonary/Chest: Effort normal and breath sounds normal. No respiratory distress.   Abdominal: Soft. Bowel sounds are normal. He exhibits no distension. There is no " splenomegaly or hepatomegaly. There is no tenderness.   Musculoskeletal: Normal range of motion. He exhibits no edema.   Neurological: He is alert and oriented to person, place, and time.   Skin: Skin is warm and dry. He is not diaphoretic. No erythema.   Psychiatric: He has a normal mood and affect. His behavior is normal. Judgment normal.         ECG 12 Lead  Date/Time: 11/22/2017 1:35 PM  Performed by: TARAN AU  Authorized by: TARAN AU   Comparison: compared with previous ECG from 10/19/2017  Similar to previous ECG  Rhythm: sinus rhythm  BPM: 68  Conduction: non-specific intraventricular conduction delay  Q waves: V3, V4 and aVF  Clinical impression: abnormal ECG  Comments: Indication: Coronary artery disease.              Assessment:       Diagnosis Plan   1. Nonrheumatic aortic valve insufficiency     2. Nonrheumatic aortic valve stenosis     3. Coronary artery disease involving native coronary artery of native heart without angina pectoris  ECG 12 Lead     Orders Placed This Encounter   Procedures   • ECG 12 Lead     This order was created via procedure documentation          Plan:       This is a patient who continues to lose weight.  I honestly think that his decline started when his wife passed away about a year ago.  Despite his aortic stenosis and aortic insufficiency, I do not think that his decline is cardiac in etiology.  I am going to check some labs on him today, CBC, CMP, and proBNP.  I spoke with the son and the patient about my concerns, and I've asked him to follow up with her primary care physician.  He will follow-up with Dr. Figueroa in January for his regularly scheduled appointment.    As always, it has been a pleasure to participate in your patient's care.      Sincerely,         Taran Au PA-C

## 2017-11-22 NOTE — TELEPHONE ENCOUNTER
A try to call the patient's number and there was no ability to leave a message.  The number for Aman is invalid and there is no number for his son Jovanni.  His labs are stable from a cardiac standpoint, look for other reasons for his weight loss.

## 2017-11-27 ENCOUNTER — TELEPHONE (OUTPATIENT)
Dept: CARDIOLOGY | Facility: CLINIC | Age: 82
End: 2017-11-27

## 2017-11-27 NOTE — TELEPHONE ENCOUNTER
I spoke with the patients spouse and she would like to know if the results are back on his labs and if so can you please call them back with the results. Please advise. [phone]          Thanks  Harrison

## 2017-11-29 ENCOUNTER — TELEPHONE (OUTPATIENT)
Dept: CARDIOLOGY | Facility: HOSPITAL | Age: 82
End: 2017-11-29

## 2017-11-29 NOTE — TELEPHONE ENCOUNTER
The patients family member called and would like to know if his results are back and if we can call with his results. [phone]

## 2018-01-08 RX ORDER — LEVOTHYROXINE SODIUM 0.15 MG/1
TABLET ORAL
Qty: 90 TABLET | Refills: 1 | Status: SHIPPED | OUTPATIENT
Start: 2018-01-08 | End: 2018-01-15 | Stop reason: HOSPADM

## 2018-01-11 ENCOUNTER — HOSPITAL ENCOUNTER (INPATIENT)
Facility: HOSPITAL | Age: 83
LOS: 4 days | Discharge: HOME | End: 2018-01-15
Attending: INTERNAL MEDICINE | Admitting: INTERNAL MEDICINE

## 2018-01-11 DIAGNOSIS — I35.0 AORTIC VALVE STENOSIS, ETIOLOGY OF CARDIAC VALVE DISEASE UNSPECIFIED: Primary | ICD-10-CM

## 2018-01-11 PROCEDURE — 25010000002 ENOXAPARIN PER 10 MG: Performed by: INTERNAL MEDICINE

## 2018-01-11 PROCEDURE — G0378 HOSPITAL OBSERVATION PER HR: HCPCS

## 2018-01-11 PROCEDURE — 25010000002 FUROSEMIDE PER 20 MG: Performed by: INTERNAL MEDICINE

## 2018-01-11 PROCEDURE — 93010 ELECTROCARDIOGRAM REPORT: CPT | Performed by: INTERNAL MEDICINE

## 2018-01-11 PROCEDURE — 93005 ELECTROCARDIOGRAM TRACING: CPT | Performed by: INTERNAL MEDICINE

## 2018-01-11 RX ORDER — PREDNISONE 1 MG/1
5 TABLET ORAL
Status: DISCONTINUED | OUTPATIENT
Start: 2018-01-12 | End: 2018-01-15 | Stop reason: HOSPADM

## 2018-01-11 RX ORDER — LATANOPROST 50 UG/ML
1 SOLUTION/ DROPS OPHTHALMIC NIGHTLY
Status: DISCONTINUED | OUTPATIENT
Start: 2018-01-11 | End: 2018-01-15 | Stop reason: HOSPADM

## 2018-01-11 RX ORDER — PREDNISONE 2.5 MG
2.5 TABLET ORAL DAILY
Status: DISCONTINUED | OUTPATIENT
Start: 2018-01-12 | End: 2018-01-12

## 2018-01-11 RX ORDER — ATORVASTATIN CALCIUM 20 MG/1
20 TABLET, FILM COATED ORAL DAILY
Status: DISCONTINUED | OUTPATIENT
Start: 2018-01-12 | End: 2018-01-12

## 2018-01-11 RX ORDER — SODIUM CHLORIDE 0.9 % (FLUSH) 0.9 %
1-10 SYRINGE (ML) INJECTION AS NEEDED
Status: DISCONTINUED | OUTPATIENT
Start: 2018-01-11 | End: 2018-01-15 | Stop reason: HOSPADM

## 2018-01-11 RX ORDER — ASPIRIN 81 MG/1
81 TABLET ORAL DAILY
Status: DISCONTINUED | OUTPATIENT
Start: 2018-01-12 | End: 2018-01-15 | Stop reason: HOSPADM

## 2018-01-11 RX ORDER — LEVOTHYROXINE SODIUM 0.15 MG/1
150 TABLET ORAL
Status: DISCONTINUED | OUTPATIENT
Start: 2018-01-12 | End: 2018-01-12

## 2018-01-11 RX ORDER — POTASSIUM CHLORIDE 750 MG/1
20 CAPSULE, EXTENDED RELEASE ORAL DAILY
Status: DISCONTINUED | OUTPATIENT
Start: 2018-01-12 | End: 2018-01-15 | Stop reason: HOSPADM

## 2018-01-11 RX ORDER — PREDNISONE 2.5 MG
2.5 TABLET ORAL DAILY
Status: ON HOLD | COMMUNITY
End: 2018-02-15

## 2018-01-11 RX ORDER — ALPRAZOLAM 0.25 MG/1
0.25 TABLET ORAL 4 TIMES DAILY PRN
Status: DISCONTINUED | OUTPATIENT
Start: 2018-01-11 | End: 2018-01-15 | Stop reason: HOSPADM

## 2018-01-11 RX ORDER — ACETAMINOPHEN 325 MG/1
650 TABLET ORAL EVERY 6 HOURS PRN
Status: DISCONTINUED | OUTPATIENT
Start: 2018-01-11 | End: 2018-01-12 | Stop reason: SDUPTHER

## 2018-01-11 RX ORDER — FUROSEMIDE 10 MG/ML
40 INJECTION INTRAMUSCULAR; INTRAVENOUS 2 TIMES DAILY
Status: DISCONTINUED | OUTPATIENT
Start: 2018-01-11 | End: 2018-01-12

## 2018-01-11 RX ORDER — NITROGLYCERIN 0.4 MG/1
0.4 TABLET SUBLINGUAL
Status: DISCONTINUED | OUTPATIENT
Start: 2018-01-11 | End: 2018-01-15 | Stop reason: HOSPADM

## 2018-01-11 RX ORDER — ONDANSETRON 2 MG/ML
4 INJECTION INTRAMUSCULAR; INTRAVENOUS EVERY 6 HOURS PRN
Status: DISCONTINUED | OUTPATIENT
Start: 2018-01-11 | End: 2018-01-15 | Stop reason: HOSPADM

## 2018-01-11 RX ORDER — PREDNISONE 1 MG/1
5 TABLET ORAL
COMMUNITY
End: 2018-01-22

## 2018-01-11 RX ADMIN — ENOXAPARIN SODIUM 90 MG: 100 INJECTION SUBCUTANEOUS at 21:43

## 2018-01-11 RX ADMIN — FUROSEMIDE 40 MG: 10 INJECTION, SOLUTION INTRAMUSCULAR; INTRAVENOUS at 21:43

## 2018-01-12 ENCOUNTER — APPOINTMENT (OUTPATIENT)
Dept: CARDIOLOGY | Facility: HOSPITAL | Age: 83
End: 2018-01-12
Attending: INTERNAL MEDICINE

## 2018-01-12 LAB
ALBUMIN SERPL-MCNC: 3.4 G/DL (ref 3.5–5.2)
ANION GAP SERPL CALCULATED.3IONS-SCNC: 15.7 MMOL/L
BH CV ECHO MEAS - AI DEC SLOPE: 459 CM/SEC^2
BH CV ECHO MEAS - AI MAX PG: 73.8 MMHG
BH CV ECHO MEAS - AI MAX VEL: 429.4 CM/SEC
BH CV ECHO MEAS - AI P1/2T: 274 MSEC
BH CV ECHO MEAS - AO MAX PG: 55 MMHG
BH CV ECHO MEAS - AO MEAN PG (FULL): 39 MMHG
BH CV ECHO MEAS - AO MEAN PG: 41 MMHG
BH CV ECHO MEAS - AO ROOT AREA (BSA CORRECTED): 1.9
BH CV ECHO MEAS - AO ROOT AREA: 12.6 CM^2
BH CV ECHO MEAS - AO ROOT DIAM: 4 CM
BH CV ECHO MEAS - AO V2 MAX: 401 CM/SEC
BH CV ECHO MEAS - AO V2 MEAN: 306 CM/SEC
BH CV ECHO MEAS - AO V2 VTI: 89.5 CM
BH CV ECHO MEAS - AVA(I,A): 0.74 CM^2
BH CV ECHO MEAS - AVA(I,D): 0.74 CM^2
BH CV ECHO MEAS - BSA(HAYCOCK): 2.1 M^2
BH CV ECHO MEAS - BSA: 2.1 M^2
BH CV ECHO MEAS - BZI_BMI: 26 KILOGRAMS/M^2
BH CV ECHO MEAS - BZI_METRIC_HEIGHT: 182.9 CM
BH CV ECHO MEAS - BZI_METRIC_WEIGHT: 87.1 KG
BH CV ECHO MEAS - CONTRAST EF (2CH): 31.8 ML/M^2
BH CV ECHO MEAS - CONTRAST EF 4CH: 34.3 ML/M^2
BH CV ECHO MEAS - EDV(CUBED): 103.8 ML
BH CV ECHO MEAS - EDV(MOD-SP2): 198 ML
BH CV ECHO MEAS - EDV(MOD-SP4): 166 ML
BH CV ECHO MEAS - EDV(TEICH): 102.4 ML
BH CV ECHO MEAS - EF(CUBED): 38.4 %
BH CV ECHO MEAS - EF(MOD-SP2): 31.8 %
BH CV ECHO MEAS - EF(MOD-SP4): 34.3 %
BH CV ECHO MEAS - EF(TEICH): 31.6 %
BH CV ECHO MEAS - ESV(CUBED): 64 ML
BH CV ECHO MEAS - ESV(MOD-SP2): 135 ML
BH CV ECHO MEAS - ESV(MOD-SP4): 109 ML
BH CV ECHO MEAS - ESV(TEICH): 70 ML
BH CV ECHO MEAS - FS: 14.9 %
BH CV ECHO MEAS - IVS/LVPW: 1.4
BH CV ECHO MEAS - IVSD: 1.5 CM
BH CV ECHO MEAS - LA DIMENSION: 4.1 CM
BH CV ECHO MEAS - LA/AO: 1
BH CV ECHO MEAS - LAT PEAK E' VEL: 8.5 CM/SEC
BH CV ECHO MEAS - LV DIASTOLIC VOL/BSA (35-75): 79.3 ML/M^2
BH CV ECHO MEAS - LV MASS(C)D: 237.9 GRAMS
BH CV ECHO MEAS - LV MASS(C)DI: 113.6 GRAMS/M^2
BH CV ECHO MEAS - LV MEAN PG: 2 MMHG
BH CV ECHO MEAS - LV SYSTOLIC VOL/BSA (12-30): 52.1 ML/M^2
BH CV ECHO MEAS - LV V1 MAX: 88 CM/SEC
BH CV ECHO MEAS - LV V1 MEAN: 60.7 CM/SEC
BH CV ECHO MEAS - LV V1 VTI: 17.5 CM
BH CV ECHO MEAS - LVIDD: 4.7 CM
BH CV ECHO MEAS - LVIDS: 4 CM
BH CV ECHO MEAS - LVLD AP2: 8.4 CM
BH CV ECHO MEAS - LVLD AP4: 8.7 CM
BH CV ECHO MEAS - LVLS AP2: 8.2 CM
BH CV ECHO MEAS - LVLS AP4: 8.2 CM
BH CV ECHO MEAS - LVOT AREA (M): 3.8 CM^2
BH CV ECHO MEAS - LVOT AREA: 3.8 CM^2
BH CV ECHO MEAS - LVOT DIAM: 2.2 CM
BH CV ECHO MEAS - LVPWD: 1.1 CM
BH CV ECHO MEAS - MED PEAK E' VEL: 6.2 CM/SEC
BH CV ECHO MEAS - MR ALIAS VEL: 38.5 CM/SEC
BH CV ECHO MEAS - MR ERO: 0.24 CM^2
BH CV ECHO MEAS - MR FLOW RATE: 118.5 CM^3/SEC
BH CV ECHO MEAS - MR MAX PG: 98.9 MMHG
BH CV ECHO MEAS - MR MAX VEL: 496.7 CM/SEC
BH CV ECHO MEAS - MR PISA RADIUS: 0.7 CM
BH CV ECHO MEAS - MR PISA: 3.1 CM^2
BH CV ECHO MEAS - MV DEC SLOPE: 900 CM/SEC^2
BH CV ECHO MEAS - MV DEC TIME: 0.12 SEC
BH CV ECHO MEAS - MV E MAX VEL: 98.7 CM/SEC
BH CV ECHO MEAS - MV MEAN PG: 5 MMHG
BH CV ECHO MEAS - MV P1/2T MAX VEL: 142 CM/SEC
BH CV ECHO MEAS - MV P1/2T: 46.2 MSEC
BH CV ECHO MEAS - MV V2 MEAN: 106 CM/SEC
BH CV ECHO MEAS - MV V2 VTI: 23.1 CM
BH CV ECHO MEAS - MVA P1/2T LCG: 1.5 CM^2
BH CV ECHO MEAS - MVA(P1/2T): 4.8 CM^2
BH CV ECHO MEAS - MVA(VTI): 2.9 CM^2
BH CV ECHO MEAS - PA ACC SLOPE: 1045 CM/SEC^2
BH CV ECHO MEAS - PA ACC TIME: 0.05 SEC
BH CV ECHO MEAS - PA MAX PG (FULL): 0.97 MMHG
BH CV ECHO MEAS - PA MAX PG: 1.6 MMHG
BH CV ECHO MEAS - PA PR(ACCEL): 55.4 MMHG
BH CV ECHO MEAS - PA V2 MAX: 62.4 CM/SEC
BH CV ECHO MEAS - PI END-D VEL: 156 CM/SEC
BH CV ECHO MEAS - PVA(V,A): 4.6 CM^2
BH CV ECHO MEAS - PVA(V,D): 4.6 CM^2
BH CV ECHO MEAS - QP/QS: 0.73
BH CV ECHO MEAS - RAP SYSTOLE: 15 MMHG
BH CV ECHO MEAS - RV MAX PG: 0.59 MMHG
BH CV ECHO MEAS - RV MEAN PG: 0 MMHG
BH CV ECHO MEAS - RV V1 MAX: 38.3 CM/SEC
BH CV ECHO MEAS - RV V1 MEAN: 26.1 CM/SEC
BH CV ECHO MEAS - RV V1 VTI: 6.4 CM
BH CV ECHO MEAS - RVOT AREA: 7.5 CM^2
BH CV ECHO MEAS - RVOT DIAM: 3.1 CM
BH CV ECHO MEAS - RVSP: 48 MMHG
BH CV ECHO MEAS - SI(AO): 537.2 ML/M^2
BH CV ECHO MEAS - SI(CUBED): 19 ML/M^2
BH CV ECHO MEAS - SI(LVOT): 31.8 ML/M^2
BH CV ECHO MEAS - SI(MOD-SP2): 30.1 ML/M^2
BH CV ECHO MEAS - SI(MOD-SP4): 27.2 ML/M^2
BH CV ECHO MEAS - SI(TEICH): 15.5 ML/M^2
BH CV ECHO MEAS - SV(AO): 1125 ML
BH CV ECHO MEAS - SV(CUBED): 39.8 ML
BH CV ECHO MEAS - SV(LVOT): 66.5 ML
BH CV ECHO MEAS - SV(MOD-SP2): 63 ML
BH CV ECHO MEAS - SV(MOD-SP4): 57 ML
BH CV ECHO MEAS - SV(RVOT): 48.5 ML
BH CV ECHO MEAS - SV(TEICH): 32.4 ML
BH CV ECHO MEAS - TAPSE (>1.6): 1 CM2
BH CV ECHO MEAS - TR MAX VEL: 289 CM/SEC
BH CV XLRA - RV BASE: 3.8 CM
BH CV XLRA - RV LENGTH: 7.5 CM
BH CV XLRA - RV MID: 2.6 CM
BH CV XLRA - TDI S': 4.9 CM/SEC
BUN BLD-MCNC: 29 MG/DL (ref 8–23)
BUN/CREAT SERPL: 18.7 (ref 7–25)
CALCIUM SPEC-SCNC: 8.5 MG/DL (ref 8.6–10.5)
CHLORIDE SERPL-SCNC: 100 MMOL/L (ref 98–107)
CO2 SERPL-SCNC: 26.3 MMOL/L (ref 22–29)
CREAT BLD-MCNC: 1.55 MG/DL (ref 0.76–1.27)
DEPRECATED RDW RBC AUTO: 57.9 FL (ref 37–54)
E/E' RATIO: 13.7
ERYTHROCYTE [DISTWIDTH] IN BLOOD BY AUTOMATED COUNT: 14.9 % (ref 11.5–14.5)
GFR SERPL CREATININE-BSD FRML MDRD: 43 ML/MIN/1.73
GLUCOSE BLD-MCNC: 83 MG/DL (ref 65–99)
HCT VFR BLD AUTO: 46.4 % (ref 40.4–52.2)
HGB BLD-MCNC: 14.9 G/DL (ref 13.7–17.6)
LEFT ATRIUM VOLUME INDEX: 49 ML/M2
LV EF 2D ECHO EST: 34 %
MAXIMAL PREDICTED HEART RATE: 140 BPM
MCH RBC QN AUTO: 34 PG (ref 27–32.7)
MCHC RBC AUTO-ENTMCNC: 32.1 G/DL (ref 32.6–36.4)
MCV RBC AUTO: 105.9 FL (ref 79.8–96.2)
PLATELET # BLD AUTO: 139 10*3/MM3 (ref 140–500)
PMV BLD AUTO: 12.8 FL (ref 6–12)
POTASSIUM BLD-SCNC: 3.9 MMOL/L (ref 3.5–5.2)
RBC # BLD AUTO: 4.38 10*6/MM3 (ref 4.6–6)
SODIUM BLD-SCNC: 142 MMOL/L (ref 136–145)
STRESS TARGET HR: 119 BPM
T4 FREE SERPL-MCNC: 1.69 NG/DL (ref 0.93–1.7)
TROPONIN T SERPL-MCNC: 0.02 NG/ML (ref 0–0.03)
TSH SERPL DL<=0.05 MIU/L-ACNC: 0.12 MIU/ML (ref 0.27–4.2)
WBC NRBC COR # BLD: 8.57 10*3/MM3 (ref 4.5–10.7)

## 2018-01-12 PROCEDURE — 80048 BASIC METABOLIC PNL TOTAL CA: CPT | Performed by: INTERNAL MEDICINE

## 2018-01-12 PROCEDURE — 93306 TTE W/DOPPLER COMPLETE: CPT | Performed by: INTERNAL MEDICINE

## 2018-01-12 PROCEDURE — 84443 ASSAY THYROID STIM HORMONE: CPT | Performed by: INTERNAL MEDICINE

## 2018-01-12 PROCEDURE — 93005 ELECTROCARDIOGRAM TRACING: CPT | Performed by: INTERNAL MEDICINE

## 2018-01-12 PROCEDURE — 93010 ELECTROCARDIOGRAM REPORT: CPT | Performed by: INTERNAL MEDICINE

## 2018-01-12 PROCEDURE — 84439 ASSAY OF FREE THYROXINE: CPT | Performed by: INTERNAL MEDICINE

## 2018-01-12 PROCEDURE — 82040 ASSAY OF SERUM ALBUMIN: CPT | Performed by: INTERNAL MEDICINE

## 2018-01-12 PROCEDURE — 85027 COMPLETE CBC AUTOMATED: CPT | Performed by: INTERNAL MEDICINE

## 2018-01-12 PROCEDURE — 63710000001 PREDNISONE PER 5 MG: Performed by: INTERNAL MEDICINE

## 2018-01-12 PROCEDURE — G0378 HOSPITAL OBSERVATION PER HR: HCPCS

## 2018-01-12 PROCEDURE — 84484 ASSAY OF TROPONIN QUANT: CPT | Performed by: INTERNAL MEDICINE

## 2018-01-12 PROCEDURE — 93306 TTE W/DOPPLER COMPLETE: CPT

## 2018-01-12 PROCEDURE — 99220 PR INITIAL OBSERVATION CARE/DAY 70 MINUTES: CPT | Performed by: INTERNAL MEDICINE

## 2018-01-12 PROCEDURE — 99223 1ST HOSP IP/OBS HIGH 75: CPT | Performed by: INTERNAL MEDICINE

## 2018-01-12 PROCEDURE — 25010000002 FUROSEMIDE PER 20 MG: Performed by: INTERNAL MEDICINE

## 2018-01-12 PROCEDURE — 25010000002 DIGOXIN PER 500 MCG: Performed by: NURSE PRACTITIONER

## 2018-01-12 RX ORDER — DIGOXIN 0.25 MG/ML
250 INJECTION INTRAMUSCULAR; INTRAVENOUS EVERY 6 HOURS
Status: COMPLETED | OUTPATIENT
Start: 2018-01-12 | End: 2018-01-13

## 2018-01-12 RX ORDER — DIGOXIN 0.25 MG/ML
500 INJECTION INTRAMUSCULAR; INTRAVENOUS ONCE
Status: COMPLETED | OUTPATIENT
Start: 2018-01-12 | End: 2018-01-12

## 2018-01-12 RX ORDER — PREDNISONE 2.5 MG
2.5 TABLET ORAL NIGHTLY
Status: DISCONTINUED | OUTPATIENT
Start: 2018-01-12 | End: 2018-01-15 | Stop reason: HOSPADM

## 2018-01-12 RX ORDER — LEVOTHYROXINE SODIUM 137 UG/1
137 TABLET ORAL
Status: DISCONTINUED | OUTPATIENT
Start: 2018-01-13 | End: 2018-01-15 | Stop reason: HOSPADM

## 2018-01-12 RX ORDER — DIGOXIN 125 MCG
125 TABLET ORAL
Status: DISCONTINUED | OUTPATIENT
Start: 2018-01-13 | End: 2018-01-15 | Stop reason: HOSPADM

## 2018-01-12 RX ORDER — FUROSEMIDE 10 MG/ML
80 INJECTION INTRAMUSCULAR; INTRAVENOUS 2 TIMES DAILY
Status: DISCONTINUED | OUTPATIENT
Start: 2018-01-12 | End: 2018-01-14

## 2018-01-12 RX ORDER — ACETAMINOPHEN 325 MG/1
650 TABLET ORAL EVERY 4 HOURS PRN
Status: DISCONTINUED | OUTPATIENT
Start: 2018-01-12 | End: 2018-01-15 | Stop reason: HOSPADM

## 2018-01-12 RX ORDER — SODIUM CHLORIDE 0.9 % (FLUSH) 0.9 %
1-10 SYRINGE (ML) INJECTION AS NEEDED
Status: DISCONTINUED | OUTPATIENT
Start: 2018-01-12 | End: 2018-01-15 | Stop reason: HOSPADM

## 2018-01-12 RX ADMIN — ATORVASTATIN CALCIUM 20 MG: 20 TABLET, FILM COATED ORAL at 08:56

## 2018-01-12 RX ADMIN — Medication 10 ML: at 16:14

## 2018-01-12 RX ADMIN — POTASSIUM CHLORIDE 20 MEQ: 750 CAPSULE, EXTENDED RELEASE ORAL at 08:57

## 2018-01-12 RX ADMIN — Medication 10 ML: at 08:59

## 2018-01-12 RX ADMIN — LEVOTHYROXINE SODIUM 150 MCG: 150 TABLET ORAL at 06:25

## 2018-01-12 RX ADMIN — DIGOXIN 500 MCG: 0.25 INJECTION INTRAMUSCULAR; INTRAVENOUS at 16:12

## 2018-01-12 RX ADMIN — DIGOXIN 250 MCG: 0.25 INJECTION INTRAMUSCULAR; INTRAVENOUS at 23:23

## 2018-01-12 RX ADMIN — ASPIRIN 81 MG: 81 TABLET ORAL at 08:56

## 2018-01-12 RX ADMIN — Medication 10 ML: at 08:56

## 2018-01-12 RX ADMIN — FUROSEMIDE 40 MG: 10 INJECTION, SOLUTION INTRAMUSCULAR; INTRAVENOUS at 08:56

## 2018-01-12 RX ADMIN — FUROSEMIDE 80 MG: 10 INJECTION, SOLUTION INTRAMUSCULAR; INTRAVENOUS at 20:51

## 2018-01-12 RX ADMIN — PREDNISONE 2.5 MG: 2.5 TABLET ORAL at 20:51

## 2018-01-12 RX ADMIN — BRIMONIDINE TARTRATE 1 DROP: 1 SOLUTION/ DROPS OPHTHALMIC at 08:57

## 2018-01-12 RX ADMIN — Medication 10 ML: at 16:12

## 2018-01-12 RX ADMIN — PREDNISONE 5 MG: 5 TABLET ORAL at 08:56

## 2018-01-12 NOTE — CONSULTS
82 y.o.  Patient Care Team:  Justin Puga MD as PCP - General (Endocrinology)  Justin Puga MD as PCP - Family Medicine  Bony Figueroa MD as Consulting Physician (Cardiology)  Judah Gonzales MD as Surgeon (Neurosurgery)  Chino Fajardo II, MD as Consulting Physician (Hematology and Oncology)      No chief complaint on file.  Hypopituitarism and hospital management    HPI     patient is a 82-year-old white male admitted to the hospital for shortness of breath and was noted to be in atrial fibrillation with rapid response.  He is currently under care of cardiology  Endocrinology was consulted for managing patient's hypopituitary state  Patient apparently had pituitary tumor and had 2 previous surgeries as well as radiation therapy in the past  He is currently on replacement AndroGel 4 pumps daily, levothyroxine 150 µg once a day, prednisone 5 mg in the morning and 2 and half milligrams in the evening  He used to be on growth hormone replacement but apparently discontinued due to arthralgia    Patient reports palpitations and shortness of breath currently  Patient's last a free T4 was 1.63 and it has been steadily increasing         Past Medical History:   Diagnosis Date   • Aortic stenosis     7/12 mod to severe with GISELE of 1 on echo; 11/14 severe AS 0.75; nl LVSF; severe AI; BNP nl   • Atherosclerotic heart disease of native coronary artery without angina pectoris    • Basal cell carcinoma of skin 09/24/2014    behind ear   • Benign prostatic hypertrophy    • Bony sclerosis    • Claustrophobia    • Disease of thyroid gland    • Edema    • Glaucoma    • Growth hormone deficiency    • Hematoma of arm     resolved 24Nov2015   • 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    • Hyperlipidemia    • Hypertension    • Hypogonadism, male    • Hypopituitarism    • IgA monoclonal gammopathy    • Lacunar stroke    • Low testosterone    • Monoclonal gammopathy of undetermined significance    • Obesity    •  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    • Pituitary benign neoplasm    • Secondary adrenal insufficiency    • Secondary hypothyroidism    • Skin rash    • Stroke    • Venous insufficiency    • Vision impairment    • Vitamin D insufficiency        Family History   Problem Relation Age of Onset   • Cancer Sister      Brain cancer   • Asthma Son    • Allergy (severe) Son    • Heart disease Mother    • No Known Problems Brother    • No Known Problems Sister    • No Known Problems Sister    • No Known Problems Sister    • No Known Problems Sister    • No Known Problems Sister    • Brain cancer Sister    • No Known Problems Sister    • No Known Problems Sister    • No Known Problems Sister    • No Known Problems Brother        Social History     Social History   • Marital status:      Spouse name: N/A   • Number of children: N/A   • Years of education: N/A     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[employer]     Social History Main Topics   • Smoking status: Former Smoker   • Smokeless tobacco: Never Used      Comment: caffeine use   • Alcohol use No   • Drug use: No   • Sexual activity: Defer     Other Topics Concern   • Not on file     Social History Narrative       Allergies   Allergen Reactions   • Somatropin Other (See Comments)     ARTHRALGIA         Current Facility-Administered Medications:   •  acetaminophen (TYLENOL) tablet 650 mg, 650 mg, Oral, Q4H PRN, Bony Figueroa MD  •  ALPRAZolam (XANAX) tablet 0.25 mg, 0.25 mg, Oral, 4x Daily PRN, Junior Ramos MD  •  aspirin EC tablet 81 mg, 81 mg, Oral, Daily, Junior Ramos MD, 81 mg at 01/12/18 0856  •  brimonidine (ALPHAGAN P) 0.1 % ophthalmic solution 1 drop, 1 drop, Both Eyes, TID, Junior Ramos MD, 1 drop at 01/12/18 0857  •  digoxin (LANOXIN) injection 250 mcg, 250 mcg, Intravenous, Q6H, SUZY Burt  •  [START ON 1/13/2018] digoxin (LANOXIN) tablet 125 mcg, 125 mcg, Oral, Daily, SUZY Burt  •   furosemide (LASIX) injection 80 mg, 80 mg, Intravenous, BID, Bony Figueroa MD  •  latanoprost (XALATAN) 0.005 % ophthalmic solution 1 drop, 1 drop, Both Eyes, Nightly, Junior Ramos MD  •  levothyroxine (SYNTHROID, LEVOTHROID) tablet 150 mcg, 150 mcg, Oral, Q AM, Junior Ramos MD, 150 mcg at 01/12/18 0625  •  nitroglycerin (NITROSTAT) SL tablet 0.4 mg, 0.4 mg, Sublingual, Q5 Min PRN, Junior Ramos MD  •  ondansetron (ZOFRAN) injection 4 mg, 4 mg, Intravenous, Q6H PRN, Junior Ramos MD  •  potassium chloride (MICRO-K) CR capsule 20 mEq, 20 mEq, Oral, Daily, Junior Ramos MD, 20 mEq at 01/12/18 0857  •  predniSONE (DELTASONE) tablet 2.5 mg, 2.5 mg, Oral, Nightly, Bony Figueroa MD  •  predniSONE (DELTASONE) tablet 5 mg, 5 mg, Oral, Daily With Breakfast, Junior Ramos MD, 5 mg at 01/12/18 0856  •  sodium chloride 0.9 % flush 1-10 mL, 1-10 mL, Intravenous, PRN, Junior Ramos MD, 10 mL at 01/12/18 1614  •  sodium chloride 0.9 % flush 1-10 mL, 1-10 mL, Intravenous, PRN, Bony Figueroa MD         Review of Systems   Constitutional: Positive for fatigue.   HENT: Negative.    Eyes: Negative.    Respiratory: Positive for shortness of breath.    Cardiovascular: Positive for palpitations.   Gastrointestinal: Positive for abdominal distention. Negative for abdominal pain.   Endocrine: Negative.    Musculoskeletal: Positive for arthralgias.   Skin: Negative.    Neurological: Positive for dizziness.   Hematological: Negative.    Psychiatric/Behavioral: Negative.    All other systems reviewed and are negative.    Objective     Vital Signs  Temp:  [97.2 °F (36.2 °C)-97.7 °F (36.5 °C)] 97.2 °F (36.2 °C)  Heart Rate:  [] 124  Resp:  [16-20] 20  BP: (100-119)/(57-86) 100/70    Physical Exam  Physical Exam   Constitutional: He is oriented to person, place, and time. He appears well-developed and well-nourished.   Eyes: EOM are normal. Pupils are equal, round, and reactive to light.    Neck: Normal range of motion. Neck supple. No thyromegaly present.   Cardiovascular: Regular rhythm, normal heart sounds and intact distal pulses.    Pulmonary/Chest: Effort normal. He has rales.   Abdominal: Soft. Bowel sounds are normal. He exhibits distension. There is no tenderness.   Musculoskeletal: Normal range of motion. He exhibits no edema.   Neurological: He is alert and oriented to person, place, and time.   Skin: Skin is warm and dry.   Psychiatric:   Anxious   Nursing note and vitals reviewed.      Results Review:    I reviewed the patient's new clinical results.  No results found for: POCGLU  Lab Results (last 72 hours)     Procedure Component Value Units Date/Time    Basic Metabolic Panel [[id number]]  (Abnormal) Collected:  01/12/18 0434    Specimen:  Blood Updated:  01/12/18 0539     Glucose 83 mg/dL      BUN 29 (H) mg/dL      Creatinine 1.55 (H) mg/dL      Sodium 142 mmol/L      Potassium 3.9 mmol/L      Chloride 100 mmol/L      CO2 26.3 mmol/L      Calcium 8.5 (L) mg/dL      eGFR Non African Amer 43 (L) mL/min/1.73      BUN/Creatinine Ratio 18.7     Anion Gap 15.7 mmol/L     Narrative:       The MDRD GFR formula is only valid for adults with stable renal function between ages 18 and 70.    T4, Free [[id number]]  (Normal) Collected:  01/12/18 0434    Specimen:  Blood Updated:  01/12/18 0555     Free T4 1.69 ng/dL     Troponin [[id number]]  (Normal) Collected:  01/12/18 0434    Specimen:  Blood Updated:  01/12/18 0555     Troponin T 0.023 ng/mL     Narrative:       Troponin T Reference Ranges:  Less than 0.03 ng/mL:    Negative for AMI  0.03 to 0.09 ng/mL:      Indeterminant for AMI  Greater than 0.09 ng/mL: Positive for AMI    TSH [[id number]]  (Abnormal) Collected:  01/12/18 0434    Specimen:  Blood Updated:  01/12/18 0555     TSH 0.118 (L) mIU/mL     CBC (No Diff) [[id number]]  (Abnormal) Collected:  01/12/18 0434    Specimen:  Blood Updated:  01/12/18 0615     WBC 8.57 10*3/mm3      RBC 4.38 (L)  10*6/mm3      Hemoglobin 14.9 g/dL      Hematocrit 46.4 %      .9 (H) fL      MCH 34.0 (H) pg      MCHC 32.1 (L) g/dL      RDW 14.9 (H) %      RDW-SD 57.9 (H) fl      MPV 12.8 (H) fL      Platelets 139 (L) 10*3/mm3     Albumin [[id number]]  (Abnormal) Collected:  01/12/18 0434    Specimen:  Blood Updated:  01/12/18 1539     Albumin 3.40 (L) g/dL           Imaging Results (last 72 hours)     ** No results found for the last 72 hours. 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   • Hyperlipidemia   • Type 2 diabetes mellitus without complication, without long-term current use of insulin   • Carotid artery disease   • Hypogonadotropic hypogonadism   • Secondary hypothyroidism   • Secondary adrenal insufficiency   • Vitamin D deficiency   • Abnormal results of function studies of other organs and systems    • Nonrheumatic aortic valve insufficiency   • Coronary artery disease involving native coronary artery of native heart without angina pectoris   • Aortic stenosis     Hypopituitarism and on replacement  Patient's free T4 is steadily increasing and was recently 1.69  Patient is currently in atrial fibrillation with rapid response  Will decrease LT4 to 137 mcg    We will continue prednisone 5 mg in the morning and 2.5 mg in the evening  We can hold AndroGel use during this hospitalization    Atrial fibrillation and rapid ventricular response is being addressed by cardiology  I discussed with the patient he verbalized understanding    The total time spent for old record and lab review and floor time was more than 110 min of which greater than 60 min of time was spent on counseling the patient on recommended evaluation and treatment  "options, instructions for management/treatment and /or follow up  and importance of compliance with chosen management or treatment options  Salomon Delgado MD FACE.  01/12/18  5:49 PM        EMR Dragon / transcription disclaimer:     \"Dictated utilizing Dragon dictation\".   "

## 2018-01-12 NOTE — PROGRESS NOTES
Continued Stay Note  [facility]     Patient Name: Jesse Taylor  MRN: [id number]  Today's Date: 1/12/2018    Admit Date: 1/11/2018          Discharge Plan       01/12/18 1503    Case Management/Social Work Plan    Plan Home no needs    Additional Comments Spoke with patient at bedside, facesheet verified.  Patient lives in a 2 story  house with his son and daughter in law. Patient IADLs prior to admit. Patient uses a straight cane. Patient  does not have a Home Health  or Rehab  history. Denies any discharge  needs and plans to return home.Dot Quevedo RN              Discharge Codes     None            Dot Quevedo RN

## 2018-01-12 NOTE — H&P
Date of Hospital Visit: 18  Encounter Provider: Bony Fgiueroa MD  Place of Service: [facility]  Patient Name: Jesse Taylor  :1935  [id number]    Chief complaint: SOA    History of Present Illness:    This is a 82 year old male with hx of hypothyroidism, CAD,  and aortic stenosis. He had a CABG in . His last echocardiogram was in 2017-  Where he had normal LV function, EF of 55%, left ventricular diastolic dysfunction (grade II), atrial volume increased, severe aortic valve regurgitation and stenosis, mild to moderate mitral valve regurgitation, and mild tricuspid regurgitation. In 2017, he did have a lot of swelling in his legs so he was started on lasix and K. He came back for a follow up with Idalia Au in 2017 where it was noted that he was not steady on his feet and that he was a poor historian. His son, who lives with him stated that he also had decreased PO intake- and had lost 20 lbs. It was thought that his decline was not due to cardiac etiology, and he was to follow up with his PCP.     He went to [facility] ER for SOA that has progressively gotten worse for 2-3 weeks, and he was starting to become SOA even with minimal exertion. Any sort of activity made it worse, and rest did make it better. He did say that he was compliant with his Lasix. He does have a significant amount of edema in his lower extremities. He was given 40 mg of IV lasix. ER work up found him to be in atrial fibrillation RVR with heart rate in the 130s, he was given a cardizem bolus and started on a gtt and transferred here for further care.     He says he does not know why his legs have been getting bigger for the past couple of weeks. He denies having any chest pain or palpitations. He says that his appetite has been improving and had initially thought that the cold weather was the reason why he was becoming SOA.     Echocardiogram  pending. Troponin 0.023. Elevated crt at 1.55. TSH at 0.118.     Labs at [facility]- NA- 142, K- 4.6, BUN/crt- 29/1.49,  BNP of 922, PLT-139, hgb-15.6, WBC-7.5    He has massive edema in his leg has continued to lose weight.  He has an elevated proBNP    Echocardiogram on 9/11/17-  Interpretation Summary      · Left ventricular systolic function is normal. Calculated EF = 55%. Estimated EF was in agreement with the calculated EF. Normal left ventricular cavity size noted. All left ventricular wall segments contract normally.  · Left ventricular wall thickness is consistent with mild concentric hypertrophy.  · Left ventricular diastolic dysfunction is noted (grade II w/high LAP) consistent with pseudonormalization.  · Left atrial volume is mildly increased.  · There is moderate calcification of the aortic valve.  · Severe aortic valve regurgitation is present. Severe aortic valve stenosis is present.  · Mild-to-moderate mitral valve regurgitation is present.  · Mild tricuspid valve regurgitation is present. Estimated right ventricular systolic pressure from tricuspid regurgitation is normal (<35 mmHg).       Past Medical History:   Diagnosis Date   • Aortic stenosis     7/12 mod to severe with GISELE of 1 on echo; 11/14 severe AS 0.75; nl LVSF; severe AI; BNP nl   • Atherosclerotic heart disease of native coronary artery without angina pectoris    • Basal cell carcinoma of skin 09/24/2014    behind ear   • Benign prostatic hypertrophy    • Bony sclerosis    • Claustrophobia    • Disease of thyroid gland    • Edema    • Glaucoma    • Growth hormone deficiency    • Hematoma of arm     resolved [id number]   • 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    • Hyperlipidemia    • Hypertension    • Hypogonadism, male    • Hypopituitarism    • IgA monoclonal gammopathy    • Lacunar stroke    • Low testosterone    • Monoclonal gammopathy of undetermined significance    • Obesity    • 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   • OTHER SURGICAL HISTORY  08/05/2013    Carotid thromboendarterectomy right,  8/13   • SKIN BIOPSY     • TONSILLECTOMY     • TRANSPHENOIDAL / TRANSNASAL HYPOPHYSECTOMY / RESECTION PITUITARY TUMOR      Benign tumor removed       Prescriptions Prior to Admission   Medication Sig Dispense Refill Last Dose   • aspirin  MG tablet Take 81 mg by mouth Daily.   1/11/2018 at Unknown time   • bimatoprost (LUMIGAN) 0.01 % ophthalmic drops Apply 1 drop to eye nightly. Both eyes.   1/11/2018 at Unknown time   • brimonidine (ALPHAGAN P) 0.1 % solution ophthalmic solution Apply 1 drop to eye.   1/11/2018 at Unknown time   • furosemide (LASIX) 40 MG tablet Take 1 tablet by mouth Daily. 90 tablet 3 1/11/2018 at Unknown time   • levothyroxine (SYNTHROID, LEVOTHROID) 150 MCG tablet TAKE 1 TABLET DAILY 90 tablet 1 1/11/2018 at Unknown time   • Multiple Vitamin (MULTI VITAMIN DAILY PO) Take 1 tablet by mouth daily.   1/11/2018 at Unknown time   • nitroglycerin (NITROSTAT) 0.4 MG SL tablet 1 under the tongue as needed for angina, may repeat q5mins for up three doses 25 tablet 0 Taking   • potassium chloride (K-DUR,KLOR-CON) 20 MEQ CR tablet Take 1 tablet by mouth Daily. 90 tablet 3 1/11/2018 at Unknown time   • predniSONE (DELTASONE) 2.5 MG tablet Take 2.5 mg by mouth Daily.   1/11/2018 at Unknown time   • predniSONE (DELTASONE) 5 MG tablet Take 5 mg by mouth.   1/11/2018 at Unknown time   • simvastatin (ZOCOR) 40 MG tablet TAKE ONE TABLET BY MOUTH EVERY EVENING 90 tablet 2 Taking   • Testosterone 20.25  MG/ACT (1.62%) gel Apply 4 pump presses one time daily as directed 450 g 1 1/11/2018 at Unknown time   • Cholecalciferol (VITAMIN D3) 1000 UNITS capsule Take  by mouth.   Taking       Current Meds  Scheduled Meds:    aspirin EC 81 mg Oral Daily   brimonidine 1 drop Both Eyes TID   furosemide 80 mg Intravenous BID   latanoprost 1 drop Both Eyes Nightly   levothyroxine 150 mcg Oral Q AM   potassium chloride 20 mEq Oral Daily   predniSONE 2.5 mg Oral Nightly   predniSONE 5 mg Oral Daily With Breakfast     Continuous Infusions:   PRN Meds:.•  acetaminophen  •  ALPRAZolam  •  nitroglycerin  •  ondansetron  •  sodium chloride  •  sodium chloride    Allergies as of 01/11/2018 - Mason as Reviewed 01/11/2018   Allergen Reaction Noted   • Somatropin Other (See Comments) 11/01/2016       Social History     Social History   • Marital status:      Spouse name: N/A   • Number of children: N/A   • Years of education: N/A     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[employer]     Social History Main Topics   • Smoking status: Former Smoker   • Smokeless tobacco: Never Used      Comment: caffeine use   • Alcohol use No   • Drug use: No   • Sexual activity: Defer     Other Topics Concern   • Not on file     Social History Narrative       Family History   Problem Relation Age of Onset   • Cancer Sister      Brain cancer   • Asthma Son    • Allergy (severe) Son    • Heart disease Mother    • No Known Problems Brother    • No Known Problems Sister    • No Known Problems Sister    • No Known Problems Sister    • No Known Problems Sister    • No Known Problems Sister    • Brain cancer Sister    • No Known Problems Sister    • No Known Problems Sister    • No Known Problems Sister    • No Known Problems Brother        REVIEW OF SYSTEMS:   ROS was performed and is negative except as outlined in HPI     REVIEW OF SYSTEMS:   CONSTITUTIONAL: No weight loss, fever, chills, weakness or fatigue.   HEENT: Eyes: No visual  "loss, blurred vision, double vision or yellow sclerae. Ears, Nose, Throat: No hearing loss, sneezing, congestion, runny nose or sore throat.   SKIN: No rash or itching.     RESPIRATORY: No shortness of breath, hemoptysis, cough or sputum.   GASTROINTESTINAL: No anorexia, nausea, vomiting or diarrhea. No abdominal pain, bright red blood per rectum or melena.  GENITOURINARY: No burning on urination, hematuria or increased frequency.  NEUROLOGICAL: No headache, dizziness, syncope, paralysis, ataxia, numbness or tingling in the extremities. No change in bowel or bladder control.   MUSCULOSKELETAL: No muscle, back pain, joint pain or stiffness.   HEMATOLOGIC: No anemia, bleeding or bruising.   LYMPHATICS: No enlarged nodes. No history of splenectomy.   PSYCHIATRIC: No history of depression, anxiety, hallucinations.   ENDOCRINOLOGIC: No reports of sweating, cold or heat intolerance. No polyuria or polydipsia.       Objective:   Temp:  [97.2 °F (36.2 °C)-97.7 °F (36.5 °C)] 97.2 °F (36.2 °C)  Heart Rate:  [] 125  Resp:  [16-20] 20  BP: (101-119)/(57-86) 103/86  Body mass index is 26.04 kg/(m^2).  Flowsheet Rows         First Filed Value    Admission Height  182.9 cm (72\") Documented at 01/11/2018 1900    Admission Weight  90.8 kg (200 lb 3.2 oz) Documented at 01/11/2018 1900        Vitals:    01/12/18 1435   BP: 103/86   Pulse: (!) 125   Resp:    Temp:    SpO2:        Head:    Normocephalic, without obvious abnormality, atraumatic   Eyes:            Lids and lashes normal, conjunctivae and sclerae normal, no   icterus, no pallor   Ears:    Ears appear intact with no abnormalities noted   Throat:   No oral lesions, dentition good   Neck:   No adenopathy, supple, trachea midline, no thyromegaly, no   carotid bruit, no JVD   Lungs:     Breath sounds are equal and clear to auscultation    Heart:    Normal S1 and S2, RRR, No M/G/R   Abdomen:     Normal bowel sounds, no masses, no organomegaly, soft        non-tender, " non-distended, no guarding   Extremities:   Moves all extremities well, no edema, no cyanosis, no redness   Pulses:   Pulses palpable and equal bilaterally.    Skin:  Psychiatric:   No bleeding, bruising or rash    Awake, alert and oriented x 3, normal mood and affect           Telemetry-    EKG on 1/11/18-      I personally viewed and interpreted the patient's EKG/Telemetry data    Assessment:  Active Hospital Problems (** Indicates Principal Problem)    Diagnosis Date Noted   • Aortic stenosis [I35.0] 01/11/2018      Resolved Hospital Problems    Diagnosis Date Noted Date Resolved   No resolved problems to display.       Plan:This is a kristan was severe area of severe AI but really an overall decline in his health I don't think is a candidate for a TAVR or SAVR of his overall health decline.  I would ask endocrinology to see him I'm and a diuresis and we'll have to watch his kidney function is overall prognosis is not great and he is an AND.      The other issue is he is in new A. fib his rate is controlled he is not a good anticoagulation candidate again because of his overall health not sure he can manage anticoagulation he's had a frailty and fall risk this may be playing a role pushing him over the edge for the failure    Bony Figueroa MD  01/12/18  2:53 PM.

## 2018-01-12 NOTE — PLAN OF CARE
Problem: Patient Care Overview (Adult)  Goal: Plan of Care Review  Outcome: Ongoing (interventions implemented as appropriate)   01/12/18 0511   Coping/Psychosocial Response Interventions   Plan Of Care Reviewed With patient;madeleine   Patient Care Overview   Progress no change   Outcome Evaluation   Outcome Summary/Follow up Plan Pt was a direct admit from [facility] for rapid afib, on cardizem gtt, CHF with 4+ BLLE edema, denied soa , chest pain/discomfort or soa upon admission, HR upon admission 90's-120's, currently 86, remains in afib, 40mg IV lasix given with 1025cc ouput this shift, pt currently resting well     Goal: Discharge Needs Assessment  Outcome: Ongoing (interventions implemented as appropriate)      Problem: Fall Risk (Adult)  Goal: Identify Related Risk Factors and Signs and Symptoms  Outcome: Ongoing (interventions implemented as appropriate)    Goal: Absence of Falls  Outcome: Ongoing (interventions implemented as appropriate)      Problem: Cardiac: Heart Failure (Adult)  Goal: Signs and Symptoms of Listed Potential Problems Will be Absent or Manageable (Cardiac: Heart Failure)  Outcome: Ongoing (interventions implemented as appropriate)      Problem: Arrhythmia/Dysrhythmia (Symptomatic) (Adult)  Goal: Signs and Symptoms of Listed Potential Problems Will be Absent or Manageable (Arrhythmia/Dysrhythmia)  Outcome: Ongoing (interventions implemented as appropriate)

## 2018-01-12 NOTE — PLAN OF CARE
Problem: Patient Care Overview (Adult)  Goal: Plan of Care Review  Outcome: Ongoing (interventions implemented as appropriate)   01/12/18 1500   Coping/Psychosocial Response Interventions   Plan Of Care Reviewed With patient   Patient Care Overview   Progress no change   Outcome Evaluation   Outcome Summary/Follow up Plan Patient denies complaints. Endo consult called in. BLE remain edematous. Code status addressed by Dr. Figureoa. Will continue to monitor.      Goal: Adult Individualization and Mutuality  Outcome: Ongoing (interventions implemented as appropriate)    Goal: Discharge Needs Assessment  Outcome: Ongoing (interventions implemented as appropriate)      Problem: Fall Risk (Adult)  Goal: Identify Related Risk Factors and Signs and Symptoms  Outcome: Outcome(s) achieved Date Met: 01/12/18    Goal: Absence of Falls  Outcome: Ongoing (interventions implemented as appropriate)      Problem: Cardiac: Heart Failure (Adult)  Goal: Signs and Symptoms of Listed Potential Problems Will be Absent or Manageable (Cardiac: Heart Failure)  Outcome: Ongoing (interventions implemented as appropriate)      Problem: Arrhythmia/Dysrhythmia (Symptomatic) (Adult)  Goal: Signs and Symptoms of Listed Potential Problems Will be Absent or Manageable (Arrhythmia/Dysrhythmia)  Outcome: Ongoing (interventions implemented as appropriate)

## 2018-01-13 LAB
ANION GAP SERPL CALCULATED.3IONS-SCNC: 11.9 MMOL/L
BUN BLD-MCNC: 29 MG/DL (ref 8–23)
BUN/CREAT SERPL: 18.5 (ref 7–25)
CALCIUM SPEC-SCNC: 8.9 MG/DL (ref 8.6–10.5)
CHLORIDE SERPL-SCNC: 98 MMOL/L (ref 98–107)
CO2 SERPL-SCNC: 32.1 MMOL/L (ref 22–29)
CREAT BLD-MCNC: 1.57 MG/DL (ref 0.76–1.27)
DEPRECATED RDW RBC AUTO: 55.5 FL (ref 37–54)
ERYTHROCYTE [DISTWIDTH] IN BLOOD BY AUTOMATED COUNT: 14.5 % (ref 11.5–14.5)
GFR SERPL CREATININE-BSD FRML MDRD: 43 ML/MIN/1.73
GLUCOSE BLD-MCNC: 78 MG/DL (ref 65–99)
HCT VFR BLD AUTO: 48.6 % (ref 40.4–52.2)
HGB BLD-MCNC: 15.3 G/DL (ref 13.7–17.6)
MCH RBC QN AUTO: 33.1 PG (ref 27–32.7)
MCHC RBC AUTO-ENTMCNC: 31.5 G/DL (ref 32.6–36.4)
MCV RBC AUTO: 105.2 FL (ref 79.8–96.2)
PLATELET # BLD AUTO: 131 10*3/MM3 (ref 140–500)
PMV BLD AUTO: 12.7 FL (ref 6–12)
POTASSIUM BLD-SCNC: 4.4 MMOL/L (ref 3.5–5.2)
RBC # BLD AUTO: 4.62 10*6/MM3 (ref 4.6–6)
SODIUM BLD-SCNC: 142 MMOL/L (ref 136–145)
WBC NRBC COR # BLD: 7.33 10*3/MM3 (ref 4.5–10.7)

## 2018-01-13 PROCEDURE — 63710000001 PREDNISONE PER 5 MG: Performed by: INTERNAL MEDICINE

## 2018-01-13 PROCEDURE — 99225 PR SBSQ OBSERVATION CARE/DAY 25 MINUTES: CPT | Performed by: NURSE PRACTITIONER

## 2018-01-13 PROCEDURE — 25010000002 FUROSEMIDE PER 20 MG: Performed by: INTERNAL MEDICINE

## 2018-01-13 PROCEDURE — 93010 ELECTROCARDIOGRAM REPORT: CPT | Performed by: INTERNAL MEDICINE

## 2018-01-13 PROCEDURE — 99233 SBSQ HOSP IP/OBS HIGH 50: CPT | Performed by: INTERNAL MEDICINE

## 2018-01-13 PROCEDURE — 85027 COMPLETE CBC AUTOMATED: CPT | Performed by: INTERNAL MEDICINE

## 2018-01-13 PROCEDURE — 93005 ELECTROCARDIOGRAM TRACING: CPT | Performed by: INTERNAL MEDICINE

## 2018-01-13 PROCEDURE — 80048 BASIC METABOLIC PNL TOTAL CA: CPT | Performed by: INTERNAL MEDICINE

## 2018-01-13 PROCEDURE — 25010000002 DIGOXIN PER 500 MCG: Performed by: NURSE PRACTITIONER

## 2018-01-13 RX ADMIN — ASPIRIN 81 MG: 81 TABLET ORAL at 08:29

## 2018-01-13 RX ADMIN — PREDNISONE 2.5 MG: 2.5 TABLET ORAL at 22:10

## 2018-01-13 RX ADMIN — LATANOPROST 1 DROP: 50 SOLUTION/ DROPS OPHTHALMIC at 22:11

## 2018-01-13 RX ADMIN — PREDNISONE 5 MG: 5 TABLET ORAL at 08:29

## 2018-01-13 RX ADMIN — POTASSIUM CHLORIDE 20 MEQ: 750 CAPSULE, EXTENDED RELEASE ORAL at 08:29

## 2018-01-13 RX ADMIN — DIGOXIN 125 MCG: 0.12 TABLET ORAL at 12:09

## 2018-01-13 RX ADMIN — FUROSEMIDE 80 MG: 10 INJECTION, SOLUTION INTRAMUSCULAR; INTRAVENOUS at 22:11

## 2018-01-13 RX ADMIN — FUROSEMIDE 80 MG: 10 INJECTION, SOLUTION INTRAMUSCULAR; INTRAVENOUS at 08:29

## 2018-01-13 RX ADMIN — LEVOTHYROXINE SODIUM 137 MCG: 137 TABLET ORAL at 06:23

## 2018-01-13 RX ADMIN — BRIMONIDINE TARTRATE 1 DROP: 1 SOLUTION/ DROPS OPHTHALMIC at 22:12

## 2018-01-13 RX ADMIN — DIGOXIN 250 MCG: 0.25 INJECTION INTRAMUSCULAR; INTRAVENOUS at 03:34

## 2018-01-13 RX ADMIN — Medication 10 ML: at 08:33

## 2018-01-13 RX ADMIN — BRIMONIDINE TARTRATE 1 DROP: 1 SOLUTION/ DROPS OPHTHALMIC at 08:29

## 2018-01-13 RX ADMIN — Medication 10 ML: at 08:29

## 2018-01-13 NOTE — PROGRESS NOTES
Cardiology Follow-Up Note      Patient Name: Jesse Taylor  Age/Sex: 82 y.o. male  : 1935  MRN: [id number]      Day of Service: 18       Chief Complaint/Follow-up: AS/CHF      S: Says he feels okay today, breathing feels better.       Temp:  [97.2 °F (36.2 °C)-97.4 °F (36.3 °C)] 97.4 °F (36.3 °C)  Heart Rate:  [] 78  Resp:  [16-20] 16  BP: (100-123)/(68-86) 116/72     PHYSICAL EXAM:    General Appearance: No acute distress. Frail elderly gentleman.   HENT: Atraumatic, normocephalic. External eyes, ears, and nose normal.   Respiratory: No signs of respiratory distress. Respiration rhythm and depth normal.   Clear to auscultation. No rales, crackles, rhonchi, or wheezing auscultated.   Cardiovascular:  Heart Rate and Rhythm: Irregularly, irregular.   Murmur heard.   Lower Extremities:  Severe bilateral lower extremity edema. 4+ pitting.  Gastrointestinal:  Abdomen soft, non-distended, non-tender.  Musculoskeletal: Moves all extremities.  Skin: Warm and dry. Bilateral lower extremities with a few intact blisters noted, changes secondary to severe edema.    Psychiatric: Patient alert and oriented to person, place, and time. Speech and behavior appropriate. Normal mood and affect.       2018 Echo:    Interpretation Summary      · Left ventricular wall thickness is consistent with hypertrophy. Sigmoid-shaped ventricular septum is present.  · Left ventricular systolic function is moderately decreased. Estimated EF = 34%.  · Left atrial cavity size is moderately dilated.  · There is severe calcification of the aortic valve mainly affecting the non, left and right coronary cusp(s).  · Moderate aortic valve regurgitation is present.  · Severe aortic valve stenosis is present.  · Severe mitral valve regurgitation is present  · Mild mitral valve stenosis is present  · Moderate tricuspid valve regurgitation is present.  · Mild pulmonic valve regurgitation is present.       ECG/TELE:                Results from last 7 days  Lab Units 01/13/18  0444 01/12/18  0434   SODIUM mmol/L 142 142   POTASSIUM mmol/L 4.4 3.9   CHLORIDE mmol/L 98 100   CO2 mmol/L 32.1* 26.3   BUN mg/dL 29* 29*   CREATININE mg/dL 1.57* 1.55*   GLUCOSE mg/dL 78 83   CALCIUM mg/dL 8.9 8.5*       Results from last 7 days  Lab Units 01/13/18  0444 01/12/18  0434   WBC 10*3/mm3 7.33 8.57   HEMOGLOBIN g/dL 15.3 14.9   HEMATOCRIT % 48.6 46.4   PLATELETS 10*3/mm3 131* 139*           Results from last 7 days  Lab Units 01/12/18  0434   TROPONIN T ng/mL 0.023       Results from last 7 days  Lab Units 01/12/18  0434   TSH mIU/mL 0.118*       Results from last 7 days  Lab Units 01/12/18  0434   FREE T4 ng/dL 1.69         Current Medications:   Scheduled Meds:  aspirin EC 81 mg Oral Daily   brimonidine 1 drop Both Eyes TID   digoxin 125 mcg Oral Daily   furosemide 80 mg Intravenous BID   latanoprost 1 drop Both Eyes Nightly   levothyroxine 137 mcg Oral Q AM   potassium chloride 20 mEq Oral Daily   predniSONE 2.5 mg Oral Nightly   predniSONE 5 mg Oral Daily With Breakfast         Active Problems:    Aortic stenosis       Plan:     -Acute systolic CHF, diuresing, leave on IV diuretics for now   -Severe aortic stenosis & moderate AI, not a TAVR or SAVR candidate, he is an AND  -Moderately decreased LV dysfunction, EF 34%, was normal 55% 9/2017.  -Hx of CAD, s/p CABG 2001  -Hypothyroid, endocrine consulted.   -New onset atrial fib/flutter, no AC due to frailty- HR rate was up some this am but when I saw him was 70-80's, will leave digoxin for now and not add any other rate controlling agents due to borderline b/p.   -CKD, creat stable at this point, will monitor and leave IV furosemide for now.     Prognosis guarded. He is an AND.     Umm Barth, APRN  01/13/18  9:39 AM

## 2018-01-13 NOTE — PROGRESS NOTES
82 y.o.   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      Subjective     HPI  Patient appears slightly better but is still tachypneic  He reports no appetite  He still reports palpitations  He understood that the levothyroxine dose was reduced to 137 µg   Patient is apparently started on digoxin for rate control    Interval History:     patient is a 82-year-old white male admitted to the hospital for shortness of breath and was noted to be in atrial fibrillation with rapid response.  He is currently under care of cardiology  Endocrinology was consulted for managing patient's hypopituitary state  Patient apparently had pituitary tumor and had 2 previous surgeries as well as radiation therapy in the past  He is currently on replacement AndroGel 4 pumps daily, levothyroxine 150 µg once a day, prednisone 5 mg in the morning and 2 and half milligrams in the evening  He used to be on growth hormone replacement but apparently discontinued due to arthralgia     Patient reports palpitations and shortness of breath currently  Patient's last a free T4 was 1.63 and it has been steadily increasing  Review of Systems:      Review of Systems   Constitutional: Positive for fatigue.   HENT: Positive for mouth sores.    Respiratory: Positive for shortness of breath.    Cardiovascular: Positive for palpitations.   Gastrointestinal: Positive for abdominal distention. Negative for abdominal pain.   Psychiatric/Behavioral: The patient is nervous/anxious.    All other systems reviewed and are negative.    Objective     Vital Signs   Temp:  [97.2 °F  (36.2 °C)-97.4 °F (36.3 °C)] 97.4 °F (36.3 °C)  Heart Rate:  [] 78  Resp:  [16-20] 16  BP: (100-123)/(68-86) 116/72    Physical Exam:  Physical Exam   Constitutional: He is oriented to person, place, and time.   Eyes: EOM are normal. Pupils are equal, round, and reactive to light.   Neck: Normal range of motion. Neck supple.   Cardiovascular: Normal rate, regular rhythm, normal heart sounds and intact distal pulses.    Pulmonary/Chest: Effort normal and breath sounds normal.   Abdominal: Soft. Bowel sounds are normal. He exhibits distension. There is no tenderness.   Musculoskeletal: Normal range of motion. He exhibits no edema.   Neurological: He is alert and oriented to person, place, and time.   Skin: Skin is warm and dry.   Nursing note and vitals reviewed.  Results Review:     I reviewed the patient's new clinical results.      Glucose   Date/Time Value Ref Range Status   01/13/2018 0444 78 65 - 99 mg/dL Final   01/12/2018 0434 83 65 - 99 mg/dL Final     Lab Results (last 72 hours)     Procedure Component Value Units Date/Time    Basic Metabolic Panel [[id number]]  (Abnormal) Collected:  01/12/18 0434    Specimen:  Blood Updated:  01/12/18 0539     Glucose 83 mg/dL      BUN 29 (H) mg/dL      Creatinine 1.55 (H) mg/dL      Sodium 142 mmol/L      Potassium 3.9 mmol/L      Chloride 100 mmol/L      CO2 26.3 mmol/L      Calcium 8.5 (L) mg/dL      eGFR Non African Amer 43 (L) mL/min/1.73      BUN/Creatinine Ratio 18.7     Anion Gap 15.7 mmol/L     Narrative:       The MDRD GFR formula is only valid for adults with stable renal function between ages 18 and 70.    T4, Free [[id number]]  (Normal) Collected:  01/12/18 0434    Specimen:  Blood Updated:  01/12/18 0555     Free T4 1.69 ng/dL     Troponin [[id number]]  (Normal) Collected:  01/12/18 0434    Specimen:  Blood Updated:  01/12/18 0555     Troponin T 0.023 ng/mL     Narrative:       Troponin T Reference Ranges:  Less than 0.03 ng/mL:    Negative for AMI  0.03  to 0.09 ng/mL:      Indeterminant for AMI  Greater than 0.09 ng/mL: Positive for AMI    TSH [[id number]]  (Abnormal) Collected:  01/12/18 0434    Specimen:  Blood Updated:  01/12/18 0555     TSH 0.118 (L) mIU/mL     CBC (No Diff) [[id number]]  (Abnormal) Collected:  01/12/18 0434    Specimen:  Blood Updated:  01/12/18 0615     WBC 8.57 10*3/mm3      RBC 4.38 (L) 10*6/mm3      Hemoglobin 14.9 g/dL      Hematocrit 46.4 %      .9 (H) fL      MCH 34.0 (H) pg      MCHC 32.1 (L) g/dL      RDW 14.9 (H) %      RDW-SD 57.9 (H) fl      MPV 12.8 (H) fL      Platelets 139 (L) 10*3/mm3     Albumin [[id number]]  (Abnormal) Collected:  01/12/18 0434    Specimen:  Blood Updated:  01/12/18 1539     Albumin 3.40 (L) g/dL     Basic Metabolic Panel [[id number]]  (Abnormal) Collected:  01/13/18 0444    Specimen:  Blood Updated:  01/13/18 0551     Glucose 78 mg/dL      BUN 29 (H) mg/dL      Creatinine 1.57 (H) mg/dL      Sodium 142 mmol/L      Potassium 4.4 mmol/L      Chloride 98 mmol/L      CO2 32.1 (H) mmol/L      Calcium 8.9 mg/dL      eGFR Non African Amer 43 (L) mL/min/1.73      BUN/Creatinine Ratio 18.5     Anion Gap 11.9 mmol/L     Narrative:       The MDRD GFR formula is only valid for adults with stable renal function between ages 18 and 70.    CBC (No Diff) [[id number]]  (Abnormal) Collected:  01/13/18 0444    Specimen:  Blood Updated:  01/13/18 0558     WBC 7.33 10*3/mm3      RBC 4.62 10*6/mm3      Hemoglobin 15.3 g/dL      Hematocrit 48.6 %      .2 (H) fL      MCH 33.1 (H) pg      MCHC 31.5 (L) g/dL      RDW 14.5 %      RDW-SD 55.5 (H) fl      MPV 12.7 (H) fL      Platelets 131 (L) 10*3/mm3         Imaging Results (last 72 hours)     ** No results found for the last 72 hours. **          Medication Review:       Current Facility-Administered Medications:   •  acetaminophen (TYLENOL) tablet 650 mg, 650 mg, Oral, Q4H PRN, Bony Figueroa MD  •  ALPRAZolam (XANAX) tablet 0.25 mg, 0.25 mg, Oral, 4x Daily PRN, Junior GTZ  MD Richard  •  aspirin EC tablet 81 mg, 81 mg, Oral, Daily, Junior Ramos MD, 81 mg at 01/13/18 0829  •  brimonidine (ALPHAGAN P) 0.1 % ophthalmic solution 1 drop, 1 drop, Both Eyes, TID, Junior Ramos MD, 1 drop at 01/13/18 0829  •  digoxin (LANOXIN) tablet 125 mcg, 125 mcg, Oral, Daily, Rosalie Chase, APRN  •  furosemide (LASIX) injection 80 mg, 80 mg, Intravenous, BID, Bony Figueroa MD, 80 mg at 01/13/18 0829  •  latanoprost (XALATAN) 0.005 % ophthalmic solution 1 drop, 1 drop, Both Eyes, Nightly, Junior Ramos MD  •  levothyroxine (SYNTHROID, LEVOTHROID) tablet 137 mcg, 137 mcg, Oral, Q AM, Salomon HAN MD, 137 mcg at 01/13/18 0623  •  nitroglycerin (NITROSTAT) SL tablet 0.4 mg, 0.4 mg, Sublingual, Q5 Min PRN, Junior Ramos MD  •  ondansetron (ZOFRAN) injection 4 mg, 4 mg, Intravenous, Q6H PRN, Junior Ramos MD  •  potassium chloride (MICRO-K) CR capsule 20 mEq, 20 mEq, Oral, Daily, Junior Ramos MD, 20 mEq at 01/13/18 0829  •  predniSONE (DELTASONE) tablet 2.5 mg, 2.5 mg, Oral, Nightly, Bony Figueroa MD, 2.5 mg at 01/12/18 2051  •  predniSONE (DELTASONE) tablet 5 mg, 5 mg, Oral, Daily With Breakfast, Junior Ramos MD, 5 mg at 01/13/18 0829  •  sodium chloride 0.9 % flush 1-10 mL, 1-10 mL, Intravenous, PRN, Junior Ramos MD, 10 mL at 01/13/18 0833  •  sodium chloride 0.9 % flush 1-10 mL, 1-10 mL, Intravenous, PRN, Bony Figueroa MD    Assessment/Plan     Patient Active Problem List   Diagnosis   • Disorder of aorta   • S/P CABG x 3   • Benign neoplasm of pituitary gland   • Nonrheumatic aortic valve stenosis   • LVH (left ventricular hypertrophy)   • MGUS (monoclonal gammopathy of unknown significance)   • 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69  Patient is currently in atrial fibrillation with rapid response  Will decrease LT4 to 137 mcg   Without the dose change patient is taking the risk of hyperthyroid status worsening a defibrillation with rapid ventricular response and which in turn may lead to stroke or heart attack  Patient verbalized understanding    We will continue prednisone 5 mg in the morning and 2.5 mg in the evening  We can hold AndroGel use during this hospitalization     Atrial fibrillation and rapid ventricular response is being addressed by cardiology  I discussed with the patient he verbalized understanding    The total time spent for old record and lab review and floor time was more than 35 min of which greater than 20 min of time  ( greater than 50% of the total time )  was spent face to face with the patient counseling and coordination of care owas spent on counseling the patient on recommended evaluation and treatment options, instructions for management/treatment and /or follow up  and importance of compliance with chosen management or treatment options    Salomon Delgado MD FACE.  01/13/18  10:06 AM      EMR Dragon / transcription disclaimer:     \"Dictated utilizing Dragon dictation\".   "

## 2018-01-13 NOTE — PLAN OF CARE
Problem: Patient Care Overview (Adult)  Goal: Plan of Care Review  Outcome: Ongoing (interventions implemented as appropriate)   01/13/18 0503   Coping/Psychosocial Response Interventions   Plan Of Care Reviewed With patient   Patient Care Overview   Progress improving   Outcome Evaluation   Outcome Summary/Follow up Plan Pt in sinus tachycardia, 100's, iv dig given, denies pain, discomfort, palpatations or soa, IV lasix given, remains with 4+ BLLE edema, code status is AND     Goal: Discharge Needs Assessment  Outcome: Ongoing (interventions implemented as appropriate)      Problem: Fall Risk (Adult)  Goal: Absence of Falls  Outcome: Ongoing (interventions implemented as appropriate)      Problem: Cardiac: Heart Failure (Adult)  Goal: Signs and Symptoms of Listed Potential Problems Will be Absent or Manageable (Cardiac: Heart Failure)  Outcome: Ongoing (interventions implemented as appropriate)      Problem: Arrhythmia/Dysrhythmia (Symptomatic) (Adult)  Goal: Signs and Symptoms of Listed Potential Problems Will be Absent or Manageable (Arrhythmia/Dysrhythmia)  Outcome: Ongoing (interventions implemented as appropriate)

## 2018-01-13 NOTE — PLAN OF CARE
Problem: Patient Care Overview (Adult)  Goal: Plan of Care Review  Outcome: Ongoing (interventions implemented as appropriate)   01/13/18 1549   Coping/Psychosocial Response Interventions   Plan Of Care Reviewed With patient   Patient Care Overview   Progress improving   Outcome Evaluation   Outcome Summary/Follow up Plan Edema seems improved but still severe. Heart rate improved, in a fib/a flutter. Denies complaints. Vitals stable. Will continue to monitor.      Goal: Adult Individualization and Mutuality  Outcome: Ongoing (interventions implemented as appropriate)    Goal: Discharge Needs Assessment  Outcome: Ongoing (interventions implemented as appropriate)      Problem: Fall Risk (Adult)  Goal: Absence of Falls  Outcome: Ongoing (interventions implemented as appropriate)      Problem: Cardiac: Heart Failure (Adult)  Goal: Signs and Symptoms of Listed Potential Problems Will be Absent or Manageable (Cardiac: Heart Failure)  Outcome: Ongoing (interventions implemented as appropriate)      Problem: Arrhythmia/Dysrhythmia (Symptomatic) (Adult)  Goal: Signs and Symptoms of Listed Potential Problems Will be Absent or Manageable (Arrhythmia/Dysrhythmia)  Outcome: Ongoing (interventions implemented as appropriate)      Problem: Skin Integrity Impairment, Risk/Actual (Adult)  Goal: Identify Related Risk Factors and Signs and Symptoms  Outcome: Ongoing (interventions implemented as appropriate)    Goal: Skin Integrity/Wound Healing  Outcome: Ongoing (interventions implemented as appropriate)

## 2018-01-14 LAB
ANION GAP SERPL CALCULATED.3IONS-SCNC: 11.1 MMOL/L
BUN BLD-MCNC: 30 MG/DL (ref 8–23)
BUN/CREAT SERPL: 19.5 (ref 7–25)
CALCIUM SPEC-SCNC: 8.9 MG/DL (ref 8.6–10.5)
CHLORIDE SERPL-SCNC: 99 MMOL/L (ref 98–107)
CO2 SERPL-SCNC: 29.9 MMOL/L (ref 22–29)
CREAT BLD-MCNC: 1.54 MG/DL (ref 0.76–1.27)
GFR SERPL CREATININE-BSD FRML MDRD: 44 ML/MIN/1.73
GLUCOSE BLD-MCNC: 102 MG/DL (ref 65–99)
POTASSIUM BLD-SCNC: 3.9 MMOL/L (ref 3.5–5.2)
SODIUM BLD-SCNC: 140 MMOL/L (ref 136–145)

## 2018-01-14 PROCEDURE — 99232 SBSQ HOSP IP/OBS MODERATE 35: CPT | Performed by: NURSE PRACTITIONER

## 2018-01-14 PROCEDURE — 25010000002 FUROSEMIDE PER 20 MG: Performed by: INTERNAL MEDICINE

## 2018-01-14 PROCEDURE — 80048 BASIC METABOLIC PNL TOTAL CA: CPT | Performed by: NURSE PRACTITIONER

## 2018-01-14 PROCEDURE — 99233 SBSQ HOSP IP/OBS HIGH 50: CPT | Performed by: INTERNAL MEDICINE

## 2018-01-14 PROCEDURE — 63710000001 PREDNISONE PER 5 MG: Performed by: INTERNAL MEDICINE

## 2018-01-14 RX ORDER — FUROSEMIDE 40 MG/1
40 TABLET ORAL
Status: DISCONTINUED | OUTPATIENT
Start: 2018-01-14 | End: 2018-01-15 | Stop reason: HOSPADM

## 2018-01-14 RX ADMIN — FUROSEMIDE 80 MG: 10 INJECTION, SOLUTION INTRAMUSCULAR; INTRAVENOUS at 08:51

## 2018-01-14 RX ADMIN — PREDNISONE 2.5 MG: 2.5 TABLET ORAL at 20:44

## 2018-01-14 RX ADMIN — BRIMONIDINE TARTRATE 1 DROP: 1 SOLUTION/ DROPS OPHTHALMIC at 20:44

## 2018-01-14 RX ADMIN — POTASSIUM CHLORIDE 20 MEQ: 750 CAPSULE, EXTENDED RELEASE ORAL at 08:12

## 2018-01-14 RX ADMIN — ASPIRIN 81 MG: 81 TABLET ORAL at 08:13

## 2018-01-14 RX ADMIN — FUROSEMIDE 40 MG: 40 TABLET ORAL at 17:18

## 2018-01-14 RX ADMIN — BRIMONIDINE TARTRATE 1 DROP: 1 SOLUTION/ DROPS OPHTHALMIC at 08:52

## 2018-01-14 RX ADMIN — PREDNISONE 5 MG: 5 TABLET ORAL at 08:12

## 2018-01-14 RX ADMIN — DIGOXIN 125 MCG: 0.12 TABLET ORAL at 11:48

## 2018-01-14 RX ADMIN — LEVOTHYROXINE SODIUM 137 MCG: 137 TABLET ORAL at 08:13

## 2018-01-14 NOTE — PLAN OF CARE
Problem: Patient Care Overview (Adult)  Goal: Plan of Care Review  Outcome: Ongoing (interventions implemented as appropriate)   01/14/18 0326   Coping/Psychosocial Response Interventions   Plan Of Care Reviewed With patient   Patient Care Overview   Progress no change   Outcome Evaluation   Outcome Summary/Follow up Plan VSS. Pt remains in AF with rate more controlled. BLE edema continues to improve per patient and report. Pt remains on 2L @ HS. Diuresis with IV Lasix continues. Will continue to monitor, should d/c home soon.

## 2018-01-14 NOTE — PROGRESS NOTES
Cardiology Follow-Up Note      Patient Name: Jesse Taylor  Age/Sex: 82 y.o. male  : 1935  MRN: [id number]      Day of Service: 18       Chief Complaint/Follow-up: AS/CHF    S: I think I feel better today. Says breathing is good, no chest pain. He says swelling is down in his legs.       Temp:  [97.4 °F (36.3 °C)-98 °F (36.7 °C)] 98 °F (36.7 °C)  Heart Rate:  [58-82] 82  Resp:  [16-18] 16  BP: ()/(40-73) 117/58     PHYSICAL EXAM:    General Appearance: No acute distress. Frail elderly gentleman.   HENT: Atraumatic, normocephalic. External eyes, ears, and nose normal.   Respiratory: No signs of respiratory distress. Respiration rhythm and depth normal.   Clear to auscultation. No rales, crackles, rhonchi, or wheezing auscultated.   Cardiovascular:  Heart Rate and Rhythm: Irregularly, irregular.   Murmur heard.   Lower Extremities:  Severe bilateral lower extremity edema. 2-3+ pitting.  Gastrointestinal:  Abdomen soft, non-distended, non-tender.  Musculoskeletal: Moves all extremities.  Skin: Warm and dry. Bilateral lower extremities with a few intact blisters noted, changes secondary to severe edema.    Psychiatric: Patient alert and oriented to person, place, and time. Speech and behavior appropriate. Normal mood and affect.       ECG/TELE: Afib/flutter, HR mostly controlled        Results from last 7 days  Lab Units 18  0444 18  0434   SODIUM mmol/L 142 142   POTASSIUM mmol/L 4.4 3.9   CHLORIDE mmol/L 98 100   CO2 mmol/L 32.1* 26.3   BUN mg/dL 29* 29*   CREATININE mg/dL 1.57* 1.55*   GLUCOSE mg/dL 78 83   CALCIUM mg/dL 8.9 8.5*       Results from last 7 days  Lab Units 18  0444 18  0434   WBC 10*3/mm3 7.33 8.57   HEMOGLOBIN g/dL 15.3 14.9   HEMATOCRIT % 48.6 46.4   PLATELETS 10*3/mm3 131* 139*           Results from last 7 days  Lab Units 18  0434   TROPONIN T ng/mL 0.023       Results from last 7 days  Lab Units 18  0434   TSH mIU/mL 0.118*        Results from last 7 days  Lab Units 01/12/18  0434   FREE T4 ng/dL 1.69         Current Medications:   Scheduled Meds:  aspirin EC 81 mg Oral Daily   brimonidine 1 drop Both Eyes Q12H   digoxin 125 mcg Oral Daily   furosemide 80 mg Intravenous BID   latanoprost 1 drop Both Eyes Nightly   levothyroxine 137 mcg Oral Q AM   potassium chloride 20 mEq Oral Daily   predniSONE 2.5 mg Oral Nightly   predniSONE 5 mg Oral Daily With Breakfast         Active Problems:    Aortic stenosis       Plan:     -Acute systolic CHF, diuresing, edema is better, wt down 28 lbs (if you believe the readings), transition to oral diuretics  -Severe aortic stenosis & moderate AI, not a TAVR or SAVR candidate, he is an AND  -Moderately decreased LV dysfunction, EF 34%, was normal 55% 9/2017.  -Hx of CAD, s/p CABG 2001  -Hypothyroid, endocrine following  -New onset atrial fib/flutter, no AC due to frailty- HR controlled for the most part, up in low 100's with some activity-on digoxin will not add anything else for rate control at this point due to b/p borderline  -CKD, labs pending, will transition to oral diuretics.    Prognosis guarded. He is an AND.     Umm Barth, APRN  01/14/18  8:30 AM

## 2018-01-14 NOTE — PLAN OF CARE
Problem: Patient Care Overview (Adult)  Goal: Plan of Care Review  Outcome: Ongoing (interventions implemented as appropriate)   01/14/18 8681   Coping/Psychosocial Response Interventions   Plan Of Care Reviewed With patient   Patient Care Overview   Progress improving   Outcome Evaluation   Outcome Summary/Follow up Plan Patient's fluid status is slowly improving. He was transitioned from IV to PO diuretics today. He refuses much activity. VSS. WIll continue to monitor.     Goal: Adult Individualization and Mutuality  Outcome: Ongoing (interventions implemented as appropriate)    Goal: Discharge Needs Assessment  Outcome: Ongoing (interventions implemented as appropriate)      Problem: Fall Risk (Adult)  Goal: Absence of Falls  Outcome: Ongoing (interventions implemented as appropriate)      Problem: Cardiac: Heart Failure (Adult)  Goal: Signs and Symptoms of Listed Potential Problems Will be Absent or Manageable (Cardiac: Heart Failure)  Outcome: Ongoing (interventions implemented as appropriate)      Problem: Arrhythmia/Dysrhythmia (Symptomatic) (Adult)  Goal: Signs and Symptoms of Listed Potential Problems Will be Absent or Manageable (Arrhythmia/Dysrhythmia)  Outcome: Ongoing (interventions implemented as appropriate)      Problem: Skin Integrity Impairment, Risk/Actual (Adult)  Goal: Identify Related Risk Factors and Signs and Symptoms  Outcome: Outcome(s) achieved Date Met: 01/14/18    Goal: Skin Integrity/Wound Healing  Outcome: Ongoing (interventions implemented as appropriate)

## 2018-01-14 NOTE — PROGRESS NOTES
82 y.o.   LOS: 1 day   Patient Care Team:  Justin Puga MD as PCP - General (Endocrinology)  Justin Puga MD as PCP - Family Medicine  Bony Figueroa MD as Consulting Physician (Cardiology)  Judah Gonzales MD as Surgeon (Neurosurgery)  Chino Fajardo II, MD as Consulting Physician (Hematology and Oncology)    Chief Complaint:  Hypopituitarism    No chief complaint on file.      Subjective     HPI  Patient appears comfortable  He denies any palpitations or shortness of breath currently  He is a heart rate is more under control since starting digoxin  His thyroid dose was also lowered to 137 µg     Interval History:     patient is a 82-year-old white male admitted to the hospital for shortness of breath and was noted to be in atrial fibrillation with rapid response.  He is currently under care of cardiology  Endocrinology was consulted for managing patient's hypopituitary state  Patient apparently had pituitary tumor and had 2 previous surgeries as well as radiation therapy in the past  He is currently on replacement AndroGel 4 pumps daily, levothyroxine 150 µg once a day, prednisone 5 mg in the morning and 2 and half milligrams in the evening  He used to be on growth hormone replacement but apparently discontinued due to arthralgia      Patient reports palpitations and shortness of breath currently  Patient's last a free T4 was 1.63 and it has been steadily increasing  Review of Systems:      Review of Systems   Constitutional: Positive for fatigue.   HENT: Positive for mouth sores.    Respiratory: Positive for shortness of breath.    Cardiovascular: Positive for palpitations.   Gastrointestinal: Positive for abdominal distention. Negative for abdominal pain.   Psychiatric/Behavioral: The patient is nervous/anxious.    All other systems reviewed and are negative.    Objective     Vital Signs   Temp:  [97.4 °F (36.3 °C)-98.6 °F (37 °C)] 97.7 °F (36.5 °C)  Heart Rate:  [58-82] 62  Resp:  [16-18] 16  BP:  ()/(40-64) 94/50    Physical Exam:  Physical Exam   Constitutional: He is oriented to person, place, and time.   Eyes: EOM are normal. Pupils are equal, round, and reactive to light.   Neck: Normal range of motion. Neck supple.   Cardiovascular: Normal rate, regular rhythm, normal heart sounds and intact distal pulses.    Pulmonary/Chest: Effort normal and breath sounds normal.   Abdominal: Soft. Bowel sounds are normal. He exhibits distension. There is no tenderness.   Musculoskeletal: Normal range of motion. He exhibits no edema.   Neurological: He is alert and oriented to person, place, and time.   Skin: Skin is warm and dry.   Nursing note and vitals reviewed.  Results Review:     I reviewed the patient's new clinical results.      Glucose   Date/Time Value Ref Range Status   01/14/2018 1122 102 (H) 65 - 99 mg/dL Final   01/13/2018 0444 78 65 - 99 mg/dL Final   01/12/2018 0434 83 65 - 99 mg/dL Final     Lab Results (last 72 hours)     Procedure Component Value Units Date/Time    Basic Metabolic Panel [[id number]]  (Abnormal) Collected:  01/12/18 0434    Specimen:  Blood Updated:  01/12/18 0539     Glucose 83 mg/dL      BUN 29 (H) mg/dL      Creatinine 1.55 (H) mg/dL      Sodium 142 mmol/L      Potassium 3.9 mmol/L      Chloride 100 mmol/L      CO2 26.3 mmol/L      Calcium 8.5 (L) mg/dL      eGFR Non African Amer 43 (L) mL/min/1.73      BUN/Creatinine Ratio 18.7     Anion Gap 15.7 mmol/L     Narrative:       The MDRD GFR formula is only valid for adults with stable renal function between ages 18 and 70.    T4, Free [[id number]]  (Normal) Collected:  01/12/18 0434    Specimen:  Blood Updated:  01/12/18 0555     Free T4 1.69 ng/dL     Troponin [[id number]]  (Normal) Collected:  01/12/18 0434    Specimen:  Blood Updated:  01/12/18 0555     Troponin T 0.023 ng/mL     Narrative:       Troponin T Reference Ranges:  Less than 0.03 ng/mL:    Negative for AMI  0.03 to 0.09 ng/mL:      Indeterminant for AMI  Greater  than 0.09 ng/mL: Positive for AMI    TSH [[id number]]  (Abnormal) Collected:  01/12/18 0434    Specimen:  Blood Updated:  01/12/18 0555     TSH 0.118 (L) mIU/mL     CBC (No Diff) [[id number]]  (Abnormal) Collected:  01/12/18 0434    Specimen:  Blood Updated:  01/12/18 0615     WBC 8.57 10*3/mm3      RBC 4.38 (L) 10*6/mm3      Hemoglobin 14.9 g/dL      Hematocrit 46.4 %      .9 (H) fL      MCH 34.0 (H) pg      MCHC 32.1 (L) g/dL      RDW 14.9 (H) %      RDW-SD 57.9 (H) fl      MPV 12.8 (H) fL      Platelets 139 (L) 10*3/mm3     Albumin [[id number]]  (Abnormal) Collected:  01/12/18 0434    Specimen:  Blood Updated:  01/12/18 1539     Albumin 3.40 (L) g/dL     Basic Metabolic Panel [[id number]]  (Abnormal) Collected:  01/13/18 0444    Specimen:  Blood Updated:  01/13/18 0551     Glucose 78 mg/dL      BUN 29 (H) mg/dL      Creatinine 1.57 (H) mg/dL      Sodium 142 mmol/L      Potassium 4.4 mmol/L      Chloride 98 mmol/L      CO2 32.1 (H) mmol/L      Calcium 8.9 mg/dL      eGFR Non African Amer 43 (L) mL/min/1.73      BUN/Creatinine Ratio 18.5     Anion Gap 11.9 mmol/L     Narrative:       The MDRD GFR formula is only valid for adults with stable renal function between ages 18 and 70.    CBC (No Diff) [[id number]]  (Abnormal) Collected:  01/13/18 0444    Specimen:  Blood Updated:  01/13/18 0558     WBC 7.33 10*3/mm3      RBC 4.62 10*6/mm3      Hemoglobin 15.3 g/dL      Hematocrit 48.6 %      .2 (H) fL      MCH 33.1 (H) pg      MCHC 31.5 (L) g/dL      RDW 14.5 %      RDW-SD 55.5 (H) fl      MPV 12.7 (H) fL      Platelets 131 (L) 10*3/mm3     Basic Metabolic Panel [[id number]]  (Abnormal) Collected:  01/14/18 1122    Specimen:  Blood Updated:  01/14/18 1203     Glucose 102 (H) mg/dL      BUN 30 (H) mg/dL      Creatinine 1.54 (H) mg/dL      Sodium 140 mmol/L      Potassium 3.9 mmol/L      Chloride 99 mmol/L      CO2 29.9 (H) mmol/L      Calcium 8.9 mg/dL      eGFR Non African Amer 44 (L) mL/min/1.73       BUN/Creatinine Ratio 19.5     Anion Gap 11.1 mmol/L     Narrative:       The MDRD GFR formula is only valid for adults with stable renal function between ages 18 and 70.        Imaging Results (last 72 hours)     ** No results found for the last 72 hours. **          Medication Review:       Current Facility-Administered Medications:   •  acetaminophen (TYLENOL) tablet 650 mg, 650 mg, Oral, Q4H PRN, Bony Figueroa MD  •  ALPRAZolam (XANAX) tablet 0.25 mg, 0.25 mg, Oral, 4x Daily PRN, Junior Ramos MD  •  aspirin EC tablet 81 mg, 81 mg, Oral, Daily, Junior Ramos MD, 81 mg at 01/14/18 0813  •  brimonidine (ALPHAGAN P) 0.1 % ophthalmic solution 1 drop, 1 drop, Both Eyes, Q12H, Ezekiel Millard MD, 1 drop at 01/14/18 0852  •  digoxin (LANOXIN) tablet 125 mcg, 125 mcg, Oral, Daily, SUZY Burt, 125 mcg at 01/14/18 1148  •  furosemide (LASIX) tablet 40 mg, 40 mg, Oral, BID, Umm Barth APRN, 40 mg at 01/14/18 1718  •  latanoprost (XALATAN) 0.005 % ophthalmic solution 1 drop, 1 drop, Both Eyes, Nightly, Junior Ramos MD, 1 drop at 01/13/18 2211  •  levothyroxine (SYNTHROID, LEVOTHROID) tablet 137 mcg, 137 mcg, Oral, Q AM, Salomon HAN MD, 137 mcg at 01/14/18 0813  •  nitroglycerin (NITROSTAT) SL tablet 0.4 mg, 0.4 mg, Sublingual, Q5 Min PRN, Junior Ramos MD  •  ondansetron (ZOFRAN) injection 4 mg, 4 mg, Intravenous, Q6H PRN, Junior Ramos MD  •  potassium chloride (MICRO-K) CR capsule 20 mEq, 20 mEq, Oral, Daily, Junior Ramos MD, 20 mEq at 01/14/18 0812  •  predniSONE (DELTASONE) tablet 2.5 mg, 2.5 mg, Oral, Nightly, Bony Figueroa MD, 2.5 mg at 01/13/18 2210  •  predniSONE (DELTASONE) tablet 5 mg, 5 mg, Oral, Daily With Breakfast, Junior Ramos MD, 5 mg at 01/14/18 0812  •  sodium chloride 0.9 % flush 1-10 mL, 1-10 mL, Intravenous, PRN, Junior Ramos MD, 10 mL at 01/13/18 0833  •  sodium chloride 0.9 % flush 1-10 mL, 1-10 mL, Intravenous,  PRN, Bony Figueroa MD    Assessment/Plan     Patient Active Problem List   Diagnosis   • Disorder of aorta   • S/P CABG x 3   • Benign neoplasm of pituitary gland   • Nonrheumatic aortic valve stenosis   • LVH (left ventricular hypertrophy)   • MGUS (monoclonal gammopathy of unknown significance)   • 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   • Hyperlipidemia   • Type 2 diabetes mellitus without complication, without long-term current use of insulin   • Carotid artery disease   • Hypogonadotropic hypogonadism   • Secondary hypothyroidism   • Secondary adrenal insufficiency   • Vitamin D deficiency   • Abnormal results of function studies of other organs and systems    • Nonrheumatic aortic valve insufficiency   • Coronary artery disease involving native coronary artery of native heart without angina pectoris   • Aortic stenosis     Hypopituitarism and on replacement  Patient's free T4 is steadily increasing and was recently 1.69  Patient is currently in atrial fibrillation with rapid response  Will decrease LT4 to 137 mcg      We will continue prednisone 5 mg in the morning and 2.5 mg in the evening  We can hold AndroGel use during this hospitalization    Patient's heart rate appears to be under control with the digoxin  He is tolerating the levothyroxine 137 µg  He is also tolerating prednisone 5 mg in the morning and 2.5 mg in the evening  I advised the patient to start AndroGel after he is discharged home  Patient verbalized understanding    Patient will be followed by Dr. Puga in am      The total time spent for old record and lab review and floor time was more than 35 min of which greater than 20 min of time was spent on counseling the patient on recommended evaluation and treatment options, instructions for management/treatment and /or follow up  and importance of compliance with chosen management or treatment options    Salomon Delgado MD FACE.  01/14/18  6:50 PM      EMR Dragon / transcription  "disclaimer:     \"Dictated utilizing Dragon dictation\".   "

## 2018-01-15 VITALS
OXYGEN SATURATION: 93 % | RESPIRATION RATE: 18 BRPM | BODY MASS INDEX: 25.41 KG/M2 | HEIGHT: 72 IN | TEMPERATURE: 97.7 F | SYSTOLIC BLOOD PRESSURE: 111 MMHG | DIASTOLIC BLOOD PRESSURE: 57 MMHG | WEIGHT: 187.56 LBS | HEART RATE: 72 BPM

## 2018-01-15 PROBLEM — I48.91 ATRIAL FIBRILLATION (HCC): Status: ACTIVE | Noted: 2018-01-15

## 2018-01-15 LAB
25(OH)D3 SERPL-MCNC: 28.9 NG/ML (ref 30–100)
ANION GAP SERPL CALCULATED.3IONS-SCNC: 15.2 MMOL/L
BUN BLD-MCNC: 29 MG/DL (ref 8–23)
BUN/CREAT SERPL: 19.7 (ref 7–25)
CALCIUM SPEC-SCNC: 8.7 MG/DL (ref 8.6–10.5)
CHLORIDE SERPL-SCNC: 95 MMOL/L (ref 98–107)
CO2 SERPL-SCNC: 28.8 MMOL/L (ref 22–29)
CREAT BLD-MCNC: 1.47 MG/DL (ref 0.76–1.27)
GFR SERPL CREATININE-BSD FRML MDRD: 46 ML/MIN/1.73
GLUCOSE BLD-MCNC: 170 MG/DL (ref 65–99)
POTASSIUM BLD-SCNC: 3.7 MMOL/L (ref 3.5–5.2)
SODIUM BLD-SCNC: 139 MMOL/L (ref 136–145)

## 2018-01-15 PROCEDURE — 82784 ASSAY IGA/IGD/IGG/IGM EACH: CPT | Performed by: INTERNAL MEDICINE

## 2018-01-15 PROCEDURE — 86334 IMMUNOFIX E-PHORESIS SERUM: CPT | Performed by: INTERNAL MEDICINE

## 2018-01-15 PROCEDURE — 82306 VITAMIN D 25 HYDROXY: CPT | Performed by: INTERNAL MEDICINE

## 2018-01-15 PROCEDURE — 84155 ASSAY OF PROTEIN SERUM: CPT | Performed by: INTERNAL MEDICINE

## 2018-01-15 PROCEDURE — 80048 BASIC METABOLIC PNL TOTAL CA: CPT | Performed by: NURSE PRACTITIONER

## 2018-01-15 PROCEDURE — 99238 HOSP IP/OBS DSCHRG MGMT 30/<: CPT | Performed by: INTERNAL MEDICINE

## 2018-01-15 PROCEDURE — 99232 SBSQ HOSP IP/OBS MODERATE 35: CPT | Performed by: INTERNAL MEDICINE

## 2018-01-15 PROCEDURE — 84165 PROTEIN E-PHORESIS SERUM: CPT | Performed by: INTERNAL MEDICINE

## 2018-01-15 PROCEDURE — 63710000001 PREDNISONE PER 5 MG: Performed by: INTERNAL MEDICINE

## 2018-01-15 RX ORDER — LEVOTHYROXINE SODIUM 137 UG/1
137 TABLET ORAL DAILY
Qty: 30 TABLET | Refills: 6 | Status: SHIPPED | OUTPATIENT
Start: 2018-01-15

## 2018-01-15 RX ORDER — DIGOXIN 125 MCG
125 TABLET ORAL
Qty: 30 TABLET | Refills: 6 | Status: SHIPPED | OUTPATIENT
Start: 2018-01-15

## 2018-01-15 RX ORDER — SIMVASTATIN 40 MG
40 TABLET ORAL NIGHTLY
Qty: 90 TABLET | Refills: 6 | Status: SHIPPED | OUTPATIENT
Start: 2018-01-15

## 2018-01-15 RX ORDER — ASPIRIN 81 MG/1
81 TABLET ORAL DAILY
Start: 2018-01-16

## 2018-01-15 RX ORDER — NITROGLYCERIN 0.4 MG/1
TABLET SUBLINGUAL
Qty: 25 TABLET | Refills: 3 | Status: SHIPPED | OUTPATIENT
Start: 2018-01-15

## 2018-01-15 RX ADMIN — FUROSEMIDE 40 MG: 40 TABLET ORAL at 09:21

## 2018-01-15 RX ADMIN — DIGOXIN 125 MCG: 0.12 TABLET ORAL at 11:27

## 2018-01-15 RX ADMIN — LEVOTHYROXINE SODIUM 137 MCG: 137 TABLET ORAL at 09:23

## 2018-01-15 RX ADMIN — BRIMONIDINE TARTRATE 1 DROP: 1 SOLUTION/ DROPS OPHTHALMIC at 09:21

## 2018-01-15 RX ADMIN — POTASSIUM CHLORIDE 20 MEQ: 750 CAPSULE, EXTENDED RELEASE ORAL at 09:20

## 2018-01-15 RX ADMIN — PREDNISONE 5 MG: 5 TABLET ORAL at 09:21

## 2018-01-15 RX ADMIN — ASPIRIN 81 MG: 81 TABLET ORAL at 09:20

## 2018-01-15 NOTE — DISCHARGE SUMMARY
Jesse Taylor  [id number]    Date of Admit: 1/11/2018  Date of Discharge:  1/15/2018    Discharge Diagnosis:  Active Hospital Problems (** Indicates Principal Problem)    Diagnosis Date Noted   • Atrial fibrillation [I48.91] 01/15/2018   • Aortic stenosis [I35.0] 01/11/2018   • Nonrheumatic aortic valve insufficiency [I35.1] 10/19/2017   • Vitamin D deficiency [E55.9] 09/12/2017   • Hypogonadotropic hypogonadism [E23.0] 06/10/2016   • Secondary adrenal insufficiency [E27.49] 06/10/2016   • Secondary hypothyroidism [E03.8] 06/10/2016   • Type 2 diabetes mellitus without complication, without long-term current use of insulin [E11.9] 06/10/2016   • 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           Hospital Course:   This is a 82 year old male with hx of hypothyroidism s/p removal from pituitary tumor, aortic stenosis- however not a candidate for TAVR or SAVR, and CAD s/p CABG in 2001. He originally went to [facility] for SOA that had gotten worse, where he was found to be in new onset atrial fibrillation and started on digoxin. He was also found to be volume overloaded was he was successfully diuresed. Endocrinology was consulted for his hypopituitary state, where he was started on prednisone, and he is to resume his androgel on discharge, and continue taking his levothyroxine. He is now stable for discharge to home and will need to follow up with Dr. Puga in 4-6 weeks with a free T4 recheck. He is to follow up with Idalia Au in 1 week and follow up with me in 4 weeks.     I think the main issue is that he has severe aortic valvular disease with possible severe AS and severe AI and he is now gone into  narcisa rudolph his pressure is low so that necessitates us putting him on digoxin for rate control which is not optimal in this elderly gentleman and we will have to watch it carefully.  But his overall prognosis is poor and he has declined significantly.  He is not a candidate for aggressive testing in a matter fact I don't think is a candidate to be anticoagulated due to frailty he will come and see Idalia in a week and he'll see me in a month we need home health to follow him.  He was also a DNR    Procedures Performed         Consults     Date and Time Order Name Status Description    1/12/2018 1449 Inpatient Consult to Endocrinology Completed           Discharge Medications     Your medication list      START taking these medications       Instructions Last Dose Given Next Dose Due    digoxin 125 MCG tablet   Commonly known as:  LANOXIN        Take 1 tablet by mouth Daily.           CHANGE how you take these medications       Instructions Last Dose Given Next Dose Due    aspirin 81 MG EC tablet   Start taking on:  1/16/2018   What changed:  medication strength        Take 1 tablet by mouth Daily.         levothyroxine 137 MCG tablet   Commonly known as:  SYNTHROID, LEVOTHROID   What changed:  See the new instructions.        Take 1 tablet by mouth Daily.           CONTINUE taking these medications       Instructions Last Dose Given Next Dose Due    bimatoprost 0.01 % ophthalmic drops   Commonly known as:  LUMIGAN              brimonidine 0.1 % solution ophthalmic solution   Commonly known as:  ALPHAGAN P              furosemide 40 MG tablet   Commonly known as:  LASIX        Take 1 tablet by mouth Daily.         MULTI VITAMIN DAILY PO              nitroglycerin 0.4 MG SL tablet   Commonly known as:  NITROSTAT        1 under the tongue as needed for angina, may repeat q5mins for up three doses         potassium chloride 20 MEQ CR tablet   Commonly known as:  K-DUR,KLOR-CON   Notes to Patient:  You received a dose  today, 1/15/2018        Take 1 tablet by mouth Daily.         predniSONE 5 MG tablet   Commonly known as:  DELTASONE              predniSONE 2.5 MG tablet   Commonly known as:  DELTASONE              simvastatin 40 MG tablet   Commonly known as:  ZOCOR        TAKE ONE TABLET BY MOUTH EVERY EVENING         Testosterone 20.25 MG/ACT (1.62%) gel        Apply 4 pump presses one time daily as directed         Vitamin D3 1000 units capsule                   Where to Get Your Medications      These medications were sent to [address] - [address] [facility] AT [facility] & [address] - [phone]  - [phone]   [address] [facility] [address]     Phone:  [phone]    • digoxin 125 MCG tablet   • levothyroxine 137 MCG tablet         Information about where to get these medications is not yet available     ! Ask your nurse or doctor about these medications    • aspirin 81 MG EC tablet             Discharge Diet: healthy heart    Activity at Discharge:  as tolerated    Discharge disposition: home    Condition on Discharge: stable    Follow-up Appointments  Future Appointments  Date Time Provider Department Center   1/22/2018 2:00 PM Bony Figueroa MD [facility] None   8/6/2018 2:00 PM Bony Figueroa MD [facility] None     Additional Instructions for the Follow-ups that You Need to Schedule     Discharge Follow-up with Specialty: follow up with Idalia Au in 1 week and follow up with Dr. Figueroa in 4 weeks    As directed    Specialty:  follow up with Idalia Au in 1 week and follow up with Dr. Figueroa in 4 weeks                     Test Results Pending at Discharge   Order Current Status    JULIUS + PE In process           Bony Figueroa MD  01/15/18  11:03 AM

## 2018-01-15 NOTE — PLAN OF CARE
Problem: Patient Care Overview (Adult)  Goal: Plan of Care Review  Outcome: Ongoing (interventions implemented as appropriate)   01/15/18 0105   Coping/Psychosocial Response Interventions   Plan Of Care Reviewed With patient   Patient Care Overview   Progress improving   Outcome Evaluation   Outcome Summary/Follow up Plan VSS. Pt changed to PO diuretics today. Offered for pt to get in the chair tonight, pt refused. Will continue to monitor. Possible d/c in the AM.

## 2018-01-15 NOTE — PROGRESS NOTES
Continued Stay Note  [facility]     Patient Name: Jesse Taylor  MRN: [id number]  Today's Date: 1/15/2018    Admit Date: 1/11/2018          Discharge Plan       01/15/18 1119    Case Management/Social Work Plan    Plan Home with [facility].    Patient/Family In Agreement With Plan yes    Additional Comments Pt will d/c home with son and dtr in law.  Dr. Figueroa has ordered [facility] to follow Pt at home.  Pt has chosen [facility] to follow him upon d/c.  CCP called referral to Marilia with [facility]....................NEO GLOVER RN/CCP              Discharge Codes     None        Expected Discharge Date and Time     Expected Discharge Date Expected Discharge Time    Rinku 15, 2018             Peg Glover RN

## 2018-01-15 NOTE — PROGRESS NOTES
Case Management Discharge Note    Final Note: Pt dc home with son Jovanni transporting via private auto.  [facility] will be following Pt at home.      Discharge Placement     Facility/Agency Request Status Selected? Address Phone Number Fax Number    [facility] Accepted    Yes [address] [address] [phone]             Discharge Codes: 06  Discharged/transferred to home under care of organized home health service organization in anticipation of skilled care

## 2018-01-15 NOTE — PROGRESS NOTES
"  ENDOCRINE    Subjective   AND PLANS   Jesse Taylor is a 82 y.o. male.     Follow-up pituitary and diabetes    Events over weekend noted.  No shortness of breath.  Leg edema improved with diuretics and digoxin.  In atrial fibrillation with controlled ventricular response.    Patient has pituitary tumor status post to previous transphenoidal resection and radiation therapy with residual tumor.  He has hypopituitarism.  Resume AndroGel 1.62% 4 pumps once a day on discharge.  Continue levothyroxine 137 µg per day and prednisone 5 mg every morning and 2.5 mg every evening.  Recheck free T4 in 4-6 weeks.     Continue vitamin D supplements.    Patient has monoclonal gammopathy of unknown significance.  He needs to follow-up with Dr. Fajardo    Objective   /57 (BP Location: Left arm, Patient Position: Lying)  Pulse 63  Temp 97.7 °F (36.5 °C) (Oral)   Resp 18  Ht 182.9 cm (72\")  Wt 85.1 kg (187 lb 9 oz)  SpO2 93%  BMI 25.44 kg/m2  Physical Exam    awake, alert, not in distress.     no rales or wheezes.  Irregularly irregular heart rate and rhythm.  Systolic murmur at the base.  No gallop  Abdomen soft, nontender.  No hepatojugular reflux.  Trace pedal edema.  No cyanosis or clubbing.    Lab Results (last 24 hours)     Procedure Component Value Units Date/Time    Basic Metabolic Panel [[id number]]  (Abnormal) Collected:  01/14/18 1122    Specimen:  Blood Updated:  01/14/18 1203     Glucose 102 (H) mg/dL      BUN 30 (H) mg/dL      Creatinine 1.54 (H) mg/dL      Sodium 140 mmol/L      Potassium 3.9 mmol/L      Chloride 99 mmol/L      CO2 29.9 (H) mmol/L      Calcium 8.9 mg/dL      eGFR Non African Amer 44 (L) mL/min/1.73      BUN/Creatinine Ratio 19.5     Anion Gap 11.1 mmol/L     Narrative:       The MDRD GFR formula is only valid for adults with stable renal function between ages 18 and 70.    Basic Metabolic Panel [[id number]]  (Abnormal) Collected:  01/15/18 0416    Specimen:  Blood Updated:  01/15/18 0522     " Glucose 170 (H) mg/dL      BUN 29 (H) mg/dL      Creatinine 1.47 (H) mg/dL      Sodium 139 mmol/L      Potassium 3.7 mmol/L      Chloride 95 (L) mmol/L      CO2 28.8 mmol/L      Calcium 8.7 mg/dL      eGFR Non African Amer 46 (L) mL/min/1.73      BUN/Creatinine Ratio 19.7     Anion Gap 15.2 mmol/L     Narrative:       The MDRD GFR formula is only valid for adults with stable renal function between ages 18 and 70.            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

## 2018-01-16 ENCOUNTER — TELEPHONE (OUTPATIENT)
Dept: CARDIOLOGY | Facility: CLINIC | Age: 83
End: 2018-01-16

## 2018-01-16 LAB
ALBUMIN SERPL-MCNC: 3.4 G/DL (ref 2.9–4.4)
ALBUMIN/GLOB SERPL: 0.9 {RATIO} (ref 0.7–1.7)
ALPHA1 GLOB FLD ELPH-MCNC: 0.3 G/DL (ref 0–0.4)
ALPHA2 GLOB SERPL ELPH-MCNC: 0.8 G/DL (ref 0.4–1)
B-GLOBULIN SERPL ELPH-MCNC: 2 G/DL (ref 0.7–1.3)
GAMMA GLOB SERPL ELPH-MCNC: 0.7 G/DL (ref 0.4–1.8)
GLOBULIN SER CALC-MCNC: 3.8 G/DL (ref 2.2–3.9)
IGA SERPL-MCNC: 1713 MG/DL (ref 61–437)
IGG SERPL-MCNC: 680 MG/DL (ref 700–1600)
IGM SERPL-MCNC: 28 MG/DL (ref 15–143)
INTERPRETATION SERPL IEP-IMP: ABNORMAL
Lab: ABNORMAL
M-SPIKE: ABNORMAL G/DL
PROT SERPL-MCNC: 7.2 G/DL (ref 6–8.5)

## 2018-01-16 NOTE — TELEPHONE ENCOUNTER
Gemini with [facility] calling to let you know that the patient is declining home health.  [phone]

## 2018-01-22 ENCOUNTER — OFFICE VISIT (OUTPATIENT)
Dept: CARDIOLOGY | Facility: CLINIC | Age: 83
End: 2018-01-22

## 2018-01-22 VITALS
DIASTOLIC BLOOD PRESSURE: 80 MMHG | HEIGHT: 72 IN | HEART RATE: 77 BPM | BODY MASS INDEX: 24.16 KG/M2 | WEIGHT: 178.4 LBS | SYSTOLIC BLOOD PRESSURE: 138 MMHG

## 2018-01-22 DIAGNOSIS — D35.2 BENIGN NEOPLASM OF PITUITARY GLAND (HCC): ICD-10-CM

## 2018-01-22 DIAGNOSIS — I35.1 NONRHEUMATIC AORTIC VALVE INSUFFICIENCY: ICD-10-CM

## 2018-01-22 DIAGNOSIS — I48.19 PERSISTENT ATRIAL FIBRILLATION (HCC): ICD-10-CM

## 2018-01-22 DIAGNOSIS — Z95.1 S/P CABG X 3: ICD-10-CM

## 2018-01-22 DIAGNOSIS — I35.0 NONRHEUMATIC AORTIC VALVE STENOSIS: Primary | ICD-10-CM

## 2018-01-22 PROCEDURE — 99213 OFFICE O/P EST LOW 20 MIN: CPT | Performed by: INTERNAL MEDICINE

## 2018-01-22 PROCEDURE — 93000 ELECTROCARDIOGRAM COMPLETE: CPT | Performed by: INTERNAL MEDICINE

## 2018-01-22 NOTE — PROGRESS NOTES
Date of Office Visit: 2018  Encounter Provider: Bony Figueroa MD  Place of Service: [facility] CARDIOLOGY  Patient Name: Jesse Taylor  :1935  [id number]    Chief Complaint   Patient presents with   • Aortic Stenosis   • Atrial Fibrillation   :     HPI: Jesse Taylor is a 82 y.o. male  he has a history of severe aortic stenosis severe aortic insufficiency cardiomyopathy overall declining health weight loss pituitary adenoma recently was in the hospital with heart failure and A. fib the A. fib was new he was not felt to be an anticoagulation candidate given his overall health and he was actually headache contrast start him on a little bit of digoxin to get his rate under control and he is here for follow-up he says he feels a lot better we had used dig because we didn't have any blood pressure to he's got some swelling in his feet he has no PND orthopnea no syncope no palpitations he says he lives at home alone with his son and his daughter helping him    Past Medical History:   Diagnosis Date   • Aortic stenosis      mod to severe with GISELE of 1 on echo;  severe AS 0.75; nl LVSF; severe AI; BNP nl   • Atherosclerotic heart disease of native coronary artery without angina pectoris    • Basal cell carcinoma of skin 2014    behind ear   • Benign prostatic hypertrophy    • Bony sclerosis    • Claustrophobia    • Disease of thyroid gland    • Edema    • Glaucoma    • Growth hormone deficiency    • Hematoma of arm     resolved 2015   • 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    • Hyperlipidemia    • Hypertension    • Hypogonadism, male    • Hypopituitarism    • IgA monoclonal gammopathy    • Lacunar stroke    • Low testosterone    • Monoclonal gammopathy of undetermined significance    • Obesity    • 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[employer]     Social History Main Topics   • Smoking status: Former Smoker   • Smokeless tobacco: Never Used      Comment: caffeine use   • Alcohol use No   • Drug use: No   • Sexual activity: Defer     Other Topics Concern   • Not on file     Social History Narrative       Family History   Problem Relation Age of Onset   • Cancer Sister      Brain cancer   • Asthma Son    • Allergy (severe) Son    • Heart disease Mother    • No Known Problems Brother    • No Known Problems Sister    • No Known Problems Sister    • No Known Problems Sister    • No Known Problems Sister    • No Known Problems Sister    • Brain cancer Sister    • No Known Problems Sister    • No Known Problems Sister    • No Known Problems Sister    • No Known Problems Brother        Review of Systems   Constitution: Negative for decreased appetite, fever, malaise/fatigue and weight loss.   HENT: Negative for nosebleeds.    Eyes: Negative for double vision.   Cardiovascular: Negative for chest pain, claudication, cyanosis, dyspnea on exertion, irregular heartbeat, leg swelling, near-syncope, orthopnea, palpitations, paroxysmal nocturnal dyspnea and syncope.   Respiratory: Negative for cough, hemoptysis  and shortness of breath.    Hematologic/Lymphatic: Negative for bleeding problem.   Skin: Negative for rash.   Musculoskeletal: Negative for falls and myalgias.   Gastrointestinal: Negative for hematochezia, jaundice, melena, nausea and vomiting.   Genitourinary: Negative for hematuria.   Neurological: Negative for dizziness and seizures.   Psychiatric/Behavioral: Negative for altered mental status and memory loss.       Allergies   Allergen Reactions   • Somatropin Other (See Comments)     ARTHRALGIA         Current Outpatient Prescriptions:   •  aspirin 81 MG EC tablet, Take 1 tablet by mouth Daily., Disp: , Rfl:   •  bimatoprost (LUMIGAN) 0.01 % ophthalmic drops, Apply 1 drop to eye nightly. Both eyes., Disp: , Rfl:   •  brimonidine (ALPHAGAN P) 0.1 % solution ophthalmic solution, Apply 1 drop to eye Every 12 (Twelve) Hours., Disp: , Rfl:   •  Cholecalciferol (VITAMIN D3) 1000 UNITS capsule, Take  by mouth., Disp: , Rfl:   •  digoxin (LANOXIN) 125 MCG tablet, Take 1 tablet by mouth Daily., Disp: 30 tablet, Rfl: 6  •  furosemide (LASIX) 40 MG tablet, Take 1 tablet by mouth Daily., Disp: 90 tablet, Rfl: 3  •  levothyroxine (SYNTHROID, LEVOTHROID) 137 MCG tablet, Take 1 tablet by mouth Daily., Disp: 30 tablet, Rfl: 6  •  Multiple Vitamin (MULTI VITAMIN DAILY PO), Take 1 tablet by mouth daily., Disp: , Rfl:   •  nitroglycerin (NITROSTAT) 0.4 MG SL tablet, 1 under the tongue as needed for angina, may repeat q5mins for up three doses, Disp: 25 tablet, Rfl: 3  •  potassium chloride (K-DUR,KLOR-CON) 20 MEQ CR tablet, Take 1 tablet by mouth Daily., Disp: 90 tablet, Rfl: 3  •  predniSONE (DELTASONE) 2.5 MG tablet, Take 2.5 mg by mouth Daily., Disp: , Rfl:   •  simvastatin (ZOCOR) 40 MG tablet, Take 1 tablet by mouth Every Night., Disp: 90 tablet, Rfl: 6  •  Testosterone 20.25 MG/ACT (1.62%) gel, Apply 4 pump presses one time daily as directed, Disp: 450 g, Rfl: 1     Objective:     Vitals:    01/22/18 1409   BP: 138/80  "  Pulse: 77   Weight: 80.9 kg (178 lb 6.4 oz)   Height: 182.9 cm (72\")     Body mass index is 24.2 kg/(m^2).    Physical Exam   Constitutional: He is oriented to person, place, and time. He appears well-developed and well-nourished.   HENT:   Head: Normocephalic.   Eyes: No scleral icterus.   Neck: No JVD present. No thyromegaly present.   Cardiovascular: Normal rate and regular rhythm.  Exam reveals no gallop and no friction rub.    Murmur heard.   Crescendo-decrescendo mid to late systolic murmur is present with a grade of 3/6   High-pitched blowing decrescendo early diastolic murmur is present with a grade of 2/6  at the upper right sternal border radiating to the apex  Pulmonary/Chest: Effort normal and breath sounds normal. He has no wheezes. He has no rales.   Abdominal: Soft. There is no hepatosplenomegaly. There is no tenderness.   Musculoskeletal: Normal range of motion. He exhibits edema (+2 B).   Lymphadenopathy:     He has no cervical adenopathy.   Neurological: He is alert and oriented to person, place, and time.   Skin: Skin is warm and dry. No rash noted.   Psychiatric: He has a normal mood and affect.         ECG 12 Lead  Date/Time: 1/22/2018 2:37 PM  Performed by: CARLOS HEMPHILL  Authorized by: CARLOS HEMPHILL   Comparison: compared with previous ECG   Similar to previous ECG  Rhythm: sinus rhythm  Q waves: II, III, aVF, V3, V4, V5 and V6  Clinical impression: abnormal ECG             Assessment:       Diagnosis Plan   1. Nonrheumatic aortic valve stenosis     2. Nonrheumatic aortic valve insufficiency     3. Persistent atrial fibrillation     4. Benign neoplasm of pituitary gland     5. S/P CABG x 3            Plan:       Right now he's doing okay I see a lot of things that are concerning about his overall health and I'll be surprised honestly if he does very well this upcoming year I am going to recommend that we continue his current meds and see me back in a 3 months it does appear that he is " gone back into sinus rhythm which is fantastic    As always, it has been a pleasure to participate in your patient's care.      Sincerely,       Bony Figueroa MD

## 2018-01-30 ENCOUNTER — TELEPHONE (OUTPATIENT)
Dept: CARDIOLOGY | Facility: CLINIC | Age: 83
End: 2018-01-30

## 2018-01-30 DIAGNOSIS — Z51.81 ENCOUNTER FOR MONITORING DIURETIC THERAPY: Primary | ICD-10-CM

## 2018-01-30 DIAGNOSIS — Z79.899 ENCOUNTER FOR MONITORING DIURETIC THERAPY: Primary | ICD-10-CM

## 2018-01-30 NOTE — TELEPHONE ENCOUNTER
01/30/18  10:19 AM  I called and instructed to take 40mg furosemide QAM and every day at 2pm/ RBV. They will go have BMP drawn on 2/6/18 - tmm.

## 2018-01-30 NOTE — TELEPHONE ENCOUNTER
01/30/18  9:13 AM  Jesse VÍCTOR Taylor  1935    Home Phone [phone]     Mr. Taylor's daughter reports that he is doing well since his follow-up visit with Dr. Figueroa on 1/22/18 except that he feels SOA when laying flat. The SOA wakes him up and he is not sleeping. He is not SOA with activity. His weight is stable. His HR has been 80s with BP 100s/70s. He is taking 40mg lasix daily.    Does he need medication adjustment?

## 2018-01-30 NOTE — TELEPHONE ENCOUNTER
Please have him take Lasix 40 mg twice a day so one tablet in the morning and one at 2:00 in the afternoon he'll will need a BMP in a week thank you

## 2018-02-06 ENCOUNTER — LAB (OUTPATIENT)
Dept: LAB | Facility: HOSPITAL | Age: 83
End: 2018-02-06
Attending: INTERNAL MEDICINE

## 2018-02-06 DIAGNOSIS — Z79.899 ENCOUNTER FOR MONITORING DIURETIC THERAPY: ICD-10-CM

## 2018-02-06 DIAGNOSIS — Z51.81 ENCOUNTER FOR MONITORING DIURETIC THERAPY: ICD-10-CM

## 2018-02-06 LAB
ANION GAP SERPL CALCULATED.3IONS-SCNC: 12.9 MMOL/L
BUN BLD-MCNC: 29 MG/DL (ref 8–23)
BUN/CREAT SERPL: 22.8 (ref 7–25)
CALCIUM SPEC-SCNC: 9.2 MG/DL (ref 8.6–10.5)
CHLORIDE SERPL-SCNC: 97 MMOL/L (ref 98–107)
CO2 SERPL-SCNC: 30.1 MMOL/L (ref 22–29)
CREAT BLD-MCNC: 1.27 MG/DL (ref 0.76–1.27)
GFR SERPL CREATININE-BSD FRML MDRD: 55 ML/MIN/1.73
GLUCOSE BLD-MCNC: 106 MG/DL (ref 65–99)
POTASSIUM BLD-SCNC: 4.3 MMOL/L (ref 3.5–5.2)
SODIUM BLD-SCNC: 140 MMOL/L (ref 136–145)

## 2018-02-06 PROCEDURE — 80048 BASIC METABOLIC PNL TOTAL CA: CPT

## 2018-02-06 PROCEDURE — 36415 COLL VENOUS BLD VENIPUNCTURE: CPT

## 2018-02-09 RX ORDER — PREDNISONE 1 MG/1
TABLET ORAL
Qty: 135 TABLET | Refills: 0 | Status: ON HOLD | OUTPATIENT
Start: 2018-02-09 | End: 2018-02-15

## 2018-02-11 ENCOUNTER — HOSPITAL ENCOUNTER (INPATIENT)
Facility: HOSPITAL | Age: 83
LOS: 4 days | Discharge: HOME | End: 2018-02-15
Attending: EMERGENCY MEDICINE | Admitting: INTERNAL MEDICINE

## 2018-02-11 ENCOUNTER — APPOINTMENT (OUTPATIENT)
Dept: GENERAL RADIOLOGY | Facility: HOSPITAL | Age: 83
End: 2018-02-11

## 2018-02-11 DIAGNOSIS — J18.9 PNEUMONIA DUE TO INFECTIOUS ORGANISM, UNSPECIFIED LATERALITY, UNSPECIFIED PART OF LUNG: ICD-10-CM

## 2018-02-11 DIAGNOSIS — R53.1 GENERALIZED WEAKNESS: ICD-10-CM

## 2018-02-11 DIAGNOSIS — I95.89 OTHER SPECIFIED HYPOTENSION: ICD-10-CM

## 2018-02-11 DIAGNOSIS — I35.0 NONRHEUMATIC AORTIC VALVE STENOSIS: ICD-10-CM

## 2018-02-11 DIAGNOSIS — I48.91 ATRIAL FIBRILLATION WITH RVR (HCC): ICD-10-CM

## 2018-02-11 DIAGNOSIS — A41.9 SEPSIS, DUE TO UNSPECIFIED ORGANISM: Primary | ICD-10-CM

## 2018-02-11 DIAGNOSIS — N17.9 AKI (ACUTE KIDNEY INJURY) (HCC): ICD-10-CM

## 2018-02-11 PROBLEM — R79.89 ABNORMAL LFTS: Status: ACTIVE | Noted: 2018-02-11

## 2018-02-11 PROBLEM — R65.21 SEPTIC SHOCK (HCC): Status: ACTIVE | Noted: 2018-02-11

## 2018-02-11 LAB
ALBUMIN SERPL-MCNC: 3.4 G/DL (ref 3.5–5.2)
ALBUMIN/GLOB SERPL: 0.7 G/DL
ALP SERPL-CCNC: 176 U/L (ref 39–117)
ALT SERPL W P-5'-P-CCNC: 38 U/L (ref 1–41)
ANION GAP SERPL CALCULATED.3IONS-SCNC: 16.2 MMOL/L
ARTERIAL PATENCY WRIST A: POSITIVE
AST SERPL-CCNC: 55 U/L (ref 1–40)
ATMOSPHERIC PRESS: 756.2 MMHG
BASE EXCESS BLDA CALC-SCNC: -2.9 MMOL/L (ref 0–2)
BASOPHILS # BLD AUTO: 0.04 10*3/MM3 (ref 0–0.2)
BASOPHILS NFR BLD AUTO: 0.4 % (ref 0–1.5)
BDY SITE: ABNORMAL
BILIRUB SERPL-MCNC: 1.4 MG/DL (ref 0.1–1.2)
BUN BLD-MCNC: 25 MG/DL (ref 8–23)
BUN/CREAT SERPL: 16.3 (ref 7–25)
CALCIUM SPEC-SCNC: 8.7 MG/DL (ref 8.6–10.5)
CHLORIDE SERPL-SCNC: 102 MMOL/L (ref 98–107)
CO2 SERPL-SCNC: 23.8 MMOL/L (ref 22–29)
CREAT BLD-MCNC: 1.53 MG/DL (ref 0.76–1.27)
D-LACTATE SERPL-SCNC: 3.7 MMOL/L (ref 0.5–2)
DEPRECATED RDW RBC AUTO: 52.5 FL (ref 37–54)
DIGOXIN SERPL-MCNC: 0.3 NG/ML (ref 0.6–1.2)
EOSINOPHIL # BLD AUTO: 0.04 10*3/MM3 (ref 0–0.7)
EOSINOPHIL NFR BLD AUTO: 0.4 % (ref 0.3–6.2)
ERYTHROCYTE [DISTWIDTH] IN BLOOD BY AUTOMATED COUNT: 13.8 % (ref 11.5–14.5)
FLUAV AG NPH QL: NEGATIVE
FLUBV AG NPH QL IA: NEGATIVE
GAS FLOW AIRWAY: 2 LPM
GFR SERPL CREATININE-BSD FRML MDRD: 44 ML/MIN/1.73
GLOBULIN UR ELPH-MCNC: 4.9 GM/DL
GLUCOSE BLD-MCNC: 94 MG/DL (ref 65–99)
HCO3 BLDA-SCNC: 20.5 MMOL/L (ref 22–28)
HCT VFR BLD AUTO: 47.8 % (ref 40.4–52.2)
HGB BLD-MCNC: 15.6 G/DL (ref 13.7–17.6)
IMM GRANULOCYTES # BLD: 0.02 10*3/MM3 (ref 0–0.03)
IMM GRANULOCYTES NFR BLD: 0.2 % (ref 0–0.5)
INR PPP: 1.26 (ref 0.9–1.1)
LYMPHOCYTES # BLD AUTO: 1.69 10*3/MM3 (ref 0.9–4.8)
LYMPHOCYTES NFR BLD AUTO: 18.2 % (ref 19.6–45.3)
MCH RBC QN AUTO: 34 PG (ref 27–32.7)
MCHC RBC AUTO-ENTMCNC: 32.6 G/DL (ref 32.6–36.4)
MCV RBC AUTO: 104.1 FL (ref 79.8–96.2)
MODALITY: ABNORMAL
MONOCYTES # BLD AUTO: 0.66 10*3/MM3 (ref 0.2–1.2)
MONOCYTES NFR BLD AUTO: 7.1 % (ref 5–12)
NEUTROPHILS # BLD AUTO: 6.84 10*3/MM3 (ref 1.9–8.1)
NEUTROPHILS NFR BLD AUTO: 73.7 % (ref 42.7–76)
NT-PROBNP SERPL-MCNC: ABNORMAL PG/ML (ref 0–1800)
PCO2 BLDA: 30.9 MM HG (ref 35–45)
PH BLDA: 7.43 PH UNITS (ref 7.35–7.45)
PLATELET # BLD AUTO: 117 10*3/MM3 (ref 140–500)
PMV BLD AUTO: 12.6 FL (ref 6–12)
PO2 BLDA: 78.5 MM HG (ref 80–100)
POTASSIUM BLD-SCNC: 4.7 MMOL/L (ref 3.5–5.2)
PROCALCITONIN SERPL-MCNC: 0.96 NG/ML (ref 0.1–0.25)
PROT SERPL-MCNC: 8.3 G/DL (ref 6–8.5)
PROTHROMBIN TIME: 15.5 SECONDS (ref 11.7–14.2)
RBC # BLD AUTO: 4.59 10*6/MM3 (ref 4.6–6)
SAO2 % BLDCOA: 96 % (ref 92–99)
SODIUM BLD-SCNC: 142 MMOL/L (ref 136–145)
TOTAL RATE: 20 BREATHS/MINUTE
TROPONIN T SERPL-MCNC: 0.04 NG/ML (ref 0–0.03)
WBC NRBC COR # BLD: 9.29 10*3/MM3 (ref 4.5–10.7)

## 2018-02-11 PROCEDURE — 87040 BLOOD CULTURE FOR BACTERIA: CPT | Performed by: EMERGENCY MEDICINE

## 2018-02-11 PROCEDURE — 80053 COMPREHEN METABOLIC PANEL: CPT | Performed by: EMERGENCY MEDICINE

## 2018-02-11 PROCEDURE — 87486 CHLMYD PNEUM DNA AMP PROBE: CPT | Performed by: EMERGENCY MEDICINE

## 2018-02-11 PROCEDURE — 84484 ASSAY OF TROPONIN QUANT: CPT | Performed by: EMERGENCY MEDICINE

## 2018-02-11 PROCEDURE — 25010000002 DIGOXIN PER 500 MCG: Performed by: EMERGENCY MEDICINE

## 2018-02-11 PROCEDURE — 87798 DETECT AGENT NOS DNA AMP: CPT | Performed by: EMERGENCY MEDICINE

## 2018-02-11 PROCEDURE — 71045 X-RAY EXAM CHEST 1 VIEW: CPT

## 2018-02-11 PROCEDURE — 87804 INFLUENZA ASSAY W/OPTIC: CPT | Performed by: EMERGENCY MEDICINE

## 2018-02-11 PROCEDURE — 25010000003 HYDROCORTISONE SOD SUCCINATE PF 250 MG RECONSTITUTED SOLUTION: Performed by: EMERGENCY MEDICINE

## 2018-02-11 PROCEDURE — 99285 EMERGENCY DEPT VISIT HI MDM: CPT

## 2018-02-11 PROCEDURE — 83605 ASSAY OF LACTIC ACID: CPT | Performed by: EMERGENCY MEDICINE

## 2018-02-11 PROCEDURE — 25010000002 AZITHROMYCIN PER 500 MG: Performed by: EMERGENCY MEDICINE

## 2018-02-11 PROCEDURE — 80162 ASSAY OF DIGOXIN TOTAL: CPT | Performed by: EMERGENCY MEDICINE

## 2018-02-11 PROCEDURE — 36600 WITHDRAWAL OF ARTERIAL BLOOD: CPT

## 2018-02-11 PROCEDURE — 36415 COLL VENOUS BLD VENIPUNCTURE: CPT

## 2018-02-11 PROCEDURE — 82803 BLOOD GASES ANY COMBINATION: CPT

## 2018-02-11 PROCEDURE — 83880 ASSAY OF NATRIURETIC PEPTIDE: CPT | Performed by: EMERGENCY MEDICINE

## 2018-02-11 PROCEDURE — 87581 M.PNEUMON DNA AMP PROBE: CPT | Performed by: EMERGENCY MEDICINE

## 2018-02-11 PROCEDURE — 25010000002 CEFTRIAXONE PER 250 MG: Performed by: EMERGENCY MEDICINE

## 2018-02-11 PROCEDURE — 93010 ELECTROCARDIOGRAM REPORT: CPT | Performed by: INTERNAL MEDICINE

## 2018-02-11 PROCEDURE — 93005 ELECTROCARDIOGRAM TRACING: CPT | Performed by: EMERGENCY MEDICINE

## 2018-02-11 PROCEDURE — 85610 PROTHROMBIN TIME: CPT | Performed by: EMERGENCY MEDICINE

## 2018-02-11 PROCEDURE — 87633 RESP VIRUS 12-25 TARGETS: CPT | Performed by: EMERGENCY MEDICINE

## 2018-02-11 PROCEDURE — 85025 COMPLETE CBC W/AUTO DIFF WBC: CPT | Performed by: EMERGENCY MEDICINE

## 2018-02-11 PROCEDURE — 84145 PROCALCITONIN (PCT): CPT | Performed by: EMERGENCY MEDICINE

## 2018-02-11 RX ORDER — OSELTAMIVIR PHOSPHATE 30 MG/1
30 CAPSULE ORAL ONCE
Status: COMPLETED | OUTPATIENT
Start: 2018-02-11 | End: 2018-02-12

## 2018-02-11 RX ORDER — ACETAMINOPHEN 500 MG
1000 TABLET ORAL ONCE
Status: COMPLETED | OUTPATIENT
Start: 2018-02-11 | End: 2018-02-11

## 2018-02-11 RX ORDER — CEFTRIAXONE SODIUM 1 G/50ML
1 INJECTION, SOLUTION INTRAVENOUS ONCE
Status: COMPLETED | OUTPATIENT
Start: 2018-02-11 | End: 2018-02-11

## 2018-02-11 RX ORDER — SODIUM CHLORIDE 0.9 % (FLUSH) 0.9 %
10 SYRINGE (ML) INJECTION AS NEEDED
Status: DISCONTINUED | OUTPATIENT
Start: 2018-02-11 | End: 2018-02-15 | Stop reason: HOSPADM

## 2018-02-11 RX ORDER — DIGOXIN 0.25 MG/ML
250 INJECTION INTRAMUSCULAR; INTRAVENOUS ONCE
Status: COMPLETED | OUTPATIENT
Start: 2018-02-11 | End: 2018-02-11

## 2018-02-11 RX ADMIN — ACETAMINOPHEN 1000 MG: 500 TABLET ORAL at 22:04

## 2018-02-11 RX ADMIN — SODIUM CHLORIDE 2586 ML: 9 INJECTION, SOLUTION INTRAVENOUS at 23:12

## 2018-02-11 RX ADMIN — CEFTRIAXONE SODIUM 1 G: 1 INJECTION, SOLUTION INTRAVENOUS at 23:22

## 2018-02-11 RX ADMIN — DIGOXIN 250 MCG: 0.25 INJECTION INTRAMUSCULAR; INTRAVENOUS at 22:52

## 2018-02-11 RX ADMIN — SODIUM CHLORIDE 1000 ML: 9 INJECTION, SOLUTION INTRAVENOUS at 22:10

## 2018-02-11 RX ADMIN — HYDROCORTISONE SODIUM SUCCINATE 100 MG: 250 INJECTION, POWDER, FOR SOLUTION INTRAMUSCULAR; INTRAVENOUS at 23:33

## 2018-02-11 RX ADMIN — AZITHROMYCIN 500 MG: 500 INJECTION, POWDER, LYOPHILIZED, FOR SOLUTION INTRAVENOUS at 22:16

## 2018-02-12 PROBLEM — J96.01 ACUTE RESPIRATORY FAILURE WITH HYPOXIA (HCC): Status: ACTIVE | Noted: 2018-02-12

## 2018-02-12 PROBLEM — I50.22 CHRONIC SYSTOLIC CONGESTIVE HEART FAILURE (HCC): Chronic | Status: ACTIVE | Noted: 2018-02-12

## 2018-02-12 PROBLEM — N18.30 STAGE 3 CHRONIC KIDNEY DISEASE (HCC): Chronic | Status: ACTIVE | Noted: 2018-02-12

## 2018-02-12 LAB
ALBUMIN SERPL-MCNC: 2.7 G/DL (ref 3.5–5.2)
ALBUMIN/GLOB SERPL: 0.8 G/DL
ALP SERPL-CCNC: 124 U/L (ref 39–117)
ALT SERPL W P-5'-P-CCNC: 28 U/L (ref 1–41)
ANION GAP SERPL CALCULATED.3IONS-SCNC: 12.9 MMOL/L
ANION GAP SERPL CALCULATED.3IONS-SCNC: 15.9 MMOL/L
AST SERPL-CCNC: 32 U/L (ref 1–40)
B PERT DNA SPEC QL NAA+PROBE: NOT DETECTED
BASOPHILS # BLD AUTO: 0.02 10*3/MM3 (ref 0–0.2)
BASOPHILS NFR BLD AUTO: 0.2 % (ref 0–1.5)
BILIRUB SERPL-MCNC: 0.9 MG/DL (ref 0.1–1.2)
BUN BLD-MCNC: 26 MG/DL (ref 8–23)
BUN BLD-MCNC: 28 MG/DL (ref 8–23)
BUN/CREAT SERPL: 17.2 (ref 7–25)
BUN/CREAT SERPL: 21.1 (ref 7–25)
C PNEUM DNA NPH QL NAA+NON-PROBE: NOT DETECTED
CALCIUM SPEC-SCNC: 7.5 MG/DL (ref 8.6–10.5)
CALCIUM SPEC-SCNC: 7.9 MG/DL (ref 8.6–10.5)
CHLORIDE SERPL-SCNC: 102 MMOL/L (ref 98–107)
CHLORIDE SERPL-SCNC: 105 MMOL/L (ref 98–107)
CO2 SERPL-SCNC: 18.1 MMOL/L (ref 22–29)
CO2 SERPL-SCNC: 25.1 MMOL/L (ref 22–29)
CREAT BLD-MCNC: 1.33 MG/DL (ref 0.76–1.27)
CREAT BLD-MCNC: 1.51 MG/DL (ref 0.76–1.27)
D-LACTATE SERPL-SCNC: 1.6 MMOL/L (ref 0.5–2)
D-LACTATE SERPL-SCNC: 2.4 MMOL/L (ref 0.5–2)
DEPRECATED RDW RBC AUTO: 53.2 FL (ref 37–54)
DIGOXIN SERPL-MCNC: 0.6 NG/ML (ref 0.6–1.2)
EOSINOPHIL # BLD AUTO: 0 10*3/MM3 (ref 0–0.7)
EOSINOPHIL NFR BLD AUTO: 0 % (ref 0.3–6.2)
ERYTHROCYTE [DISTWIDTH] IN BLOOD BY AUTOMATED COUNT: 13.8 % (ref 11.5–14.5)
FLUAV H1 2009 PAND RNA NPH QL NAA+PROBE: NOT DETECTED
FLUAV H1 HA GENE NPH QL NAA+PROBE: NOT DETECTED
FLUAV H3 RNA NPH QL NAA+PROBE: NOT DETECTED
FLUAV SUBTYP SPEC NAA+PROBE: NOT DETECTED
FLUBV RNA ISLT QL NAA+PROBE: NOT DETECTED
GFR SERPL CREATININE-BSD FRML MDRD: 45 ML/MIN/1.73
GFR SERPL CREATININE-BSD FRML MDRD: 52 ML/MIN/1.73
GLOBULIN UR ELPH-MCNC: 3.6 GM/DL
GLUCOSE BLD-MCNC: 103 MG/DL (ref 65–99)
GLUCOSE BLD-MCNC: 109 MG/DL (ref 65–99)
HADV DNA SPEC NAA+PROBE: NOT DETECTED
HBA1C MFR BLD: 5.9 % (ref 4.8–5.6)
HCOV 229E RNA SPEC QL NAA+PROBE: NOT DETECTED
HCOV HKU1 RNA SPEC QL NAA+PROBE: NOT DETECTED
HCOV NL63 RNA SPEC QL NAA+PROBE: NOT DETECTED
HCOV OC43 RNA SPEC QL NAA+PROBE: NOT DETECTED
HCT VFR BLD AUTO: 45.4 % (ref 40.4–52.2)
HGB BLD-MCNC: 14.4 G/DL (ref 13.7–17.6)
HMPV RNA NPH QL NAA+NON-PROBE: DETECTED
HOLD SPECIMEN: NORMAL
HPIV1 RNA SPEC QL NAA+PROBE: NOT DETECTED
HPIV2 RNA SPEC QL NAA+PROBE: NOT DETECTED
HPIV3 RNA NPH QL NAA+PROBE: NOT DETECTED
HPIV4 P GENE NPH QL NAA+PROBE: NOT DETECTED
IMM GRANULOCYTES # BLD: 0.02 10*3/MM3 (ref 0–0.03)
IMM GRANULOCYTES NFR BLD: 0.2 % (ref 0–0.5)
LYMPHOCYTES # BLD AUTO: 0.44 10*3/MM3 (ref 0.9–4.8)
LYMPHOCYTES NFR BLD AUTO: 5.3 % (ref 19.6–45.3)
M PNEUMO IGG SER IA-ACNC: NOT DETECTED
MCH RBC QN AUTO: 33 PG (ref 27–32.7)
MCHC RBC AUTO-ENTMCNC: 31.7 G/DL (ref 32.6–36.4)
MCV RBC AUTO: 104.1 FL (ref 79.8–96.2)
MONOCYTES # BLD AUTO: 0.3 10*3/MM3 (ref 0.2–1.2)
MONOCYTES NFR BLD AUTO: 3.6 % (ref 5–12)
NEUTROPHILS # BLD AUTO: 7.52 10*3/MM3 (ref 1.9–8.1)
NEUTROPHILS NFR BLD AUTO: 90.7 % (ref 42.7–76)
NRBC BLD MANUAL-RTO: 0 /100 WBC (ref 0–0)
PLATELET # BLD AUTO: 86 10*3/MM3 (ref 140–500)
PMV BLD AUTO: 12.9 FL (ref 6–12)
POTASSIUM BLD-SCNC: 4.6 MMOL/L (ref 3.5–5.2)
POTASSIUM BLD-SCNC: 4.7 MMOL/L (ref 3.5–5.2)
PROT SERPL-MCNC: 6.3 G/DL (ref 6–8.5)
RBC # BLD AUTO: 4.36 10*6/MM3 (ref 4.6–6)
RHINOVIRUS RNA SPEC NAA+PROBE: NOT DETECTED
RSV RNA NPH QL NAA+NON-PROBE: NOT DETECTED
S PNEUM AG SPEC QL LA: NEGATIVE
SODIUM BLD-SCNC: 139 MMOL/L (ref 136–145)
SODIUM BLD-SCNC: 140 MMOL/L (ref 136–145)
T4 FREE SERPL-MCNC: 1.22 NG/DL (ref 0.93–1.7)
TROPONIN T SERPL-MCNC: 0.03 NG/ML (ref 0–0.03)
WBC NRBC COR # BLD: 8.3 10*3/MM3 (ref 4.5–10.7)

## 2018-02-12 PROCEDURE — 25010000002 HEPARIN (PORCINE) PER 1000 UNITS: Performed by: INTERNAL MEDICINE

## 2018-02-12 PROCEDURE — 80162 ASSAY OF DIGOXIN TOTAL: CPT | Performed by: INTERNAL MEDICINE

## 2018-02-12 PROCEDURE — 99222 1ST HOSP IP/OBS MODERATE 55: CPT | Performed by: INTERNAL MEDICINE

## 2018-02-12 PROCEDURE — 83036 HEMOGLOBIN GLYCOSYLATED A1C: CPT | Performed by: INTERNAL MEDICINE

## 2018-02-12 PROCEDURE — 80053 COMPREHEN METABOLIC PANEL: CPT | Performed by: INTERNAL MEDICINE

## 2018-02-12 PROCEDURE — 25010000002 HYDROCORTISONE SODIUM SUCCINATE 100 MG RECONSTITUTED SOLUTION: Performed by: INTERNAL MEDICINE

## 2018-02-12 PROCEDURE — 87899 AGENT NOS ASSAY W/OPTIC: CPT | Performed by: INTERNAL MEDICINE

## 2018-02-12 PROCEDURE — 25010000002 VANCOMYCIN 10 G RECONSTITUTED SOLUTION: Performed by: INTERNAL MEDICINE

## 2018-02-12 PROCEDURE — 25010000002 PIPERACILLIN SOD-TAZOBACTAM PER 1 G: Performed by: INTERNAL MEDICINE

## 2018-02-12 PROCEDURE — 83605 ASSAY OF LACTIC ACID: CPT | Performed by: INTERNAL MEDICINE

## 2018-02-12 PROCEDURE — 25010000003 HYDROCORTISONE SOD SUCCINATE PF 250 MG RECONSTITUTED SOLUTION: Performed by: EMERGENCY MEDICINE

## 2018-02-12 PROCEDURE — 85025 COMPLETE CBC W/AUTO DIFF WBC: CPT | Performed by: INTERNAL MEDICINE

## 2018-02-12 PROCEDURE — 84484 ASSAY OF TROPONIN QUANT: CPT | Performed by: INTERNAL MEDICINE

## 2018-02-12 PROCEDURE — 84439 ASSAY OF FREE THYROXINE: CPT | Performed by: INTERNAL MEDICINE

## 2018-02-12 PROCEDURE — 83605 ASSAY OF LACTIC ACID: CPT | Performed by: EMERGENCY MEDICINE

## 2018-02-12 RX ORDER — HEPARIN SODIUM 5000 [USP'U]/ML
5000 INJECTION, SOLUTION INTRAVENOUS; SUBCUTANEOUS EVERY 12 HOURS SCHEDULED
Status: DISCONTINUED | OUTPATIENT
Start: 2018-02-12 | End: 2018-02-15 | Stop reason: HOSPADM

## 2018-02-12 RX ORDER — SODIUM CHLORIDE 0.9 % (FLUSH) 0.9 %
1-10 SYRINGE (ML) INJECTION AS NEEDED
Status: DISCONTINUED | OUTPATIENT
Start: 2018-02-12 | End: 2018-02-15 | Stop reason: HOSPADM

## 2018-02-12 RX ORDER — LEVOFLOXACIN 5 MG/ML
500 INJECTION, SOLUTION INTRAVENOUS EVERY 24 HOURS
Status: DISCONTINUED | OUTPATIENT
Start: 2018-02-12 | End: 2018-02-12

## 2018-02-12 RX ORDER — DIGOXIN 125 MCG
125 TABLET ORAL
Status: DISCONTINUED | OUTPATIENT
Start: 2018-02-12 | End: 2018-02-15 | Stop reason: HOSPADM

## 2018-02-12 RX ORDER — LEVOTHYROXINE SODIUM ANHYDROUS 100 UG/5ML
100 INJECTION, POWDER, LYOPHILIZED, FOR SOLUTION INTRAVENOUS
Status: DISCONTINUED | OUTPATIENT
Start: 2018-02-12 | End: 2018-02-12

## 2018-02-12 RX ORDER — HEPARIN SODIUM 5000 [USP'U]/ML
5000 INJECTION, SOLUTION INTRAVENOUS; SUBCUTANEOUS EVERY 8 HOURS SCHEDULED
Status: DISCONTINUED | OUTPATIENT
Start: 2018-02-12 | End: 2018-02-12 | Stop reason: SDUPTHER

## 2018-02-12 RX ORDER — DIGOXIN 0.05 MG/ML
125 SOLUTION ORAL
Status: DISCONTINUED | OUTPATIENT
Start: 2018-02-12 | End: 2018-02-12

## 2018-02-12 RX ORDER — ONDANSETRON 2 MG/ML
4 INJECTION INTRAMUSCULAR; INTRAVENOUS EVERY 6 HOURS PRN
Status: DISCONTINUED | OUTPATIENT
Start: 2018-02-12 | End: 2018-02-15 | Stop reason: HOSPADM

## 2018-02-12 RX ORDER — LEVOTHYROXINE SODIUM 137 UG/1
137 TABLET ORAL
Status: DISCONTINUED | OUTPATIENT
Start: 2018-02-13 | End: 2018-02-15 | Stop reason: HOSPADM

## 2018-02-12 RX ORDER — IPRATROPIUM BROMIDE AND ALBUTEROL SULFATE 2.5; .5 MG/3ML; MG/3ML
3 SOLUTION RESPIRATORY (INHALATION) EVERY 6 HOURS PRN
Status: DISCONTINUED | OUTPATIENT
Start: 2018-02-12 | End: 2018-02-15 | Stop reason: HOSPADM

## 2018-02-12 RX ORDER — LEVOFLOXACIN 5 MG/ML
750 INJECTION, SOLUTION INTRAVENOUS
Status: DISCONTINUED | OUTPATIENT
Start: 2018-02-14 | End: 2018-02-15 | Stop reason: HOSPADM

## 2018-02-12 RX ADMIN — HYDROCORTISONE SODIUM SUCCINATE 100 MG: 250 INJECTION, POWDER, FOR SOLUTION INTRAMUSCULAR; INTRAVENOUS at 07:32

## 2018-02-12 RX ADMIN — TAZOBACTAM SODIUM AND PIPERACILLIN SODIUM 3.38 G: 375; 3 INJECTION, SOLUTION INTRAVENOUS at 17:34

## 2018-02-12 RX ADMIN — TAZOBACTAM SODIUM AND PIPERACILLIN SODIUM 3.38 G: 375; 3 INJECTION, SOLUTION INTRAVENOUS at 03:23

## 2018-02-12 RX ADMIN — HEPARIN SODIUM 5000 UNITS: 5000 INJECTION, SOLUTION INTRAVENOUS; SUBCUTANEOUS at 05:41

## 2018-02-12 RX ADMIN — OSELTAMIVIR PHOSPHATE 30 MG: 30 CAPSULE ORAL at 00:48

## 2018-02-12 RX ADMIN — DIGOXIN 125 MCG: 0.12 TABLET ORAL at 20:58

## 2018-02-12 RX ADMIN — TAZOBACTAM SODIUM AND PIPERACILLIN SODIUM 3.38 G: 375; 3 INJECTION, SOLUTION INTRAVENOUS at 10:07

## 2018-02-12 RX ADMIN — HYDROCORTISONE SODIUM SUCCINATE 50 MG: 100 INJECTION, POWDER, FOR SOLUTION INTRAMUSCULAR; INTRAVENOUS at 21:55

## 2018-02-12 RX ADMIN — HEPARIN SODIUM 5000 UNITS: 5000 INJECTION, SOLUTION INTRAVENOUS; SUBCUTANEOUS at 17:34

## 2018-02-12 RX ADMIN — Medication 1750 MG: at 08:50

## 2018-02-12 RX ADMIN — LEVOTHYROXINE SODIUM ANHYDROUS 100 MCG: 100 INJECTION, POWDER, LYOPHILIZED, FOR SOLUTION INTRAVENOUS at 17:34

## 2018-02-12 NOTE — ED NOTES
Pharmacy called about Levophed, sending via tube system now.     Vera Guillory RN  02/11/18 8092

## 2018-02-12 NOTE — CONSULTS
Date of Hospital Visit: 18  Encounter Provider: Bony Figueroa MD  Place of Service: [facility] CARDIOLOGY  Patient Name: Jesse Taylor  :1935  [id number]  Referral Provider: Trey Parker MD    Chief complaint: palpitations    Consulted for: A fib    History of Present Illness: Mr. Taylor is a 81 y/o patient of mine with a history of CABG x 3  with LIMA to LAD, SVG to OM1 and RCA, AV stenosis, A fib,  LVH, hypopituitarism post adenoma resection, hyperlipidemia, DM and hypothyroidism. He was hospitalized 18-1/15/18 with new onset a fib and volume overload. He was started on digoxin and diuresed. Endocrine saw for hypopituitary state and he was started on prednisone. An echo was done which showed a lower EF-34% vs 55% in . It also showed severe AVS, severe MVS and moderate TR.     He presented to the ED last night with c/o palpitations, poor appetite, fever, soa, vomiting and diarrhea. On arrival his temp was 103.1. Labs showed a creat of 1.53, , bili 1.4, INR 1.26, proBNP 49752, trop 0.039, lactate 3.7, WBC 9.3, plts 117. CXR was negative. EKG showed a fib, rate of 105 with LAD, IVCD, poor R wave progression anteriorly and anterior Q wave. He is being treated for sepsis.     He has not been doing well.  Continues to lose weight continues to be short of breath has some edema in his legs he denies any chest pain    Cardiac testing:  Echo 2018  · Left ventricular wall thickness is consistent with hypertrophy. Sigmoid-shaped ventricular septum is present.  · Left ventricular systolic function is moderately decreased. Estimated EF = 34%.  · Left atrial cavity size is moderately dilated.  · There is severe calcification of the aortic valve mainly affecting the non, left and right coronary cusp(s).  · Moderate aortic valve regurgitation is present.  · Severe aortic valve stenosis is present.  · Severe mitral valve regurgitation is present  · Mild  mitral valve stenosis is present  · Moderate tricuspid valve regurgitation is present.  · Mild pulmonic valve regurgitation is present.    Echo 9/2017  · Left ventricular systolic function is normal. Calculated EF = 55%. Estimated EF was in agreement with the calculated EF. Normal left ventricular cavity size noted. All left ventricular wall segments contract normally.  · Left ventricular wall thickness is consistent with mild concentric hypertrophy.  · Left ventricular diastolic dysfunction is noted (grade II w/high LAP) consistent with pseudonormalization.  · Left atrial volume is mildly increased.  · There is moderate calcification of the aortic valve.  · Severe aortic valve regurgitation is present. Severe aortic valve stenosis is present.  · Mild-to-moderate mitral valve regurgitation is present.  · Mild tricuspid valve regurgitation is present. Estimated right ventricular systolic pressure from tricuspid regurgitation is normal (<35 mmHg).      Past Medical History:   Diagnosis Date   • Aortic stenosis     7/12 mod to severe with GISELE of 1 on echo; 11/14 severe AS 0.75; nl LVSF; severe AI; BNP nl   • Atherosclerotic heart disease of native coronary artery without angina pectoris    • Basal cell carcinoma of skin 09/24/2014    behind ear   • Benign prostatic hypertrophy    • Bony sclerosis    • Claustrophobia    • Disease of thyroid gland    • Edema    • Glaucoma    • Growth hormone deficiency    • Hematoma of arm     resolved 24Nov2015   • 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    • Hyperlipidemia    • Hypertension    • Hypogonadism, male    • Hypopituitarism    • IgA monoclonal gammopathy    • Lacunar stroke    • Low testosterone    • Monoclonal gammopathy of undetermined significance    • Obesity    • 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   • OTHER SURGICAL HISTORY  08/05/2013    Carotid thromboendarterectomy right,  8/13   • SKIN BIOPSY     • TONSILLECTOMY     • TRANSPHENOIDAL / TRANSNASAL HYPOPHYSECTOMY / RESECTION PITUITARY TUMOR      Benign tumor removed         (Not in a hospital admission)    Current Meds  Scheduled Meds:    azithromycin 500 mg Intravenous Q24H   digoxin 125 mcg Oral Daily   heparin (porcine) 5,000 Units Subcutaneous Q8H   hydrocortisone sodium succinate 100 mg Intravenous Q8H   levothyroxine sodium 100 mcg Intravenous Daily   piperacillin-tazobactam 3.375 g Intravenous Q8H   [START ON 2/13/2018] vancomycin 1,500 mg Intravenous Q24H     Continuous Infusions:    norepinephrine 0.02-0.3 mcg/kg/min Last Rate: Stopped (02/12/18 0014)   Pharmacy to dose vancomycin       PRN Meds:.Pharmacy to dose vancomycin  •  sodium chloride  •  Insert peripheral IV **AND** sodium chloride    Allergies as of 02/11/2018 - Mason as Reviewed 02/11/2018   Allergen Reaction Noted   • Somatropin Other (See Comments) 11/01/2016       Social History     Social History   • Marital status:      Spouse name: N/A   • Number of children: N/A   • Years of education: N/A     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[employer]     Social History Main Topics   • Smoking status: Former Smoker   • Smokeless tobacco: Never Used      Comment: caffeine use   • Alcohol use No   • Drug use: No   • Sexual activity: Defer     Other Topics Concern   • Not on file     Social History Narrative       Family History   Problem Relation Age of Onset   • Cancer Sister      Brain cancer   • Asthma Son    • Allergy (severe) Son    • Heart disease Mother    • No Known Problems Brother    •  "No Known Problems Sister    • No Known Problems Sister    • No Known Problems Sister    • No Known Problems Sister    • No Known Problems Sister    • Brain cancer Sister    • No Known Problems Sister    • No Known Problems Sister    • No Known Problems Sister    • No Known Problems Brother        REVIEW OF SYSTEMS:   ROS was performed and is negative except as outlined in HPI     REVIEW OF SYSTEMS:   CONSTITUTIONAL: No weight loss, fever, chills, weakness or fatigue.   HEENT: Eyes: No visual loss, blurred vision, double vision or yellow sclerae. Ears, Nose, Throat: No hearing loss, sneezing, congestion, runny nose or sore throat.   SKIN: No rash or itching.     RESPIRATORY: No shortness of breath, hemoptysis, cough or sputum.   GASTROINTESTINAL: No anorexia, nausea, vomiting or diarrhea. No abdominal pain, bright red blood per rectum or melena.  GENITOURINARY: No burning on urination, hematuria or increased frequency.  NEUROLOGICAL: No headache, dizziness, syncope, paralysis, ataxia, numbness or tingling in the extremities. No change in bowel or bladder control.   MUSCULOSKELETAL: No muscle, back pain, joint pain or stiffness.   HEMATOLOGIC: No anemia, bleeding or bruising.   LYMPHATICS: No enlarged nodes. No history of splenectomy.   PSYCHIATRIC: No history of depression, anxiety, hallucinations.   ENDOCRINOLOGIC: No reports of sweating, cold or heat intolerance. No polyuria or polydipsia.       Objective:   Temp:  [96.5 °F (35.8 °C)-103.1 °F (39.5 °C)] 97.5 °F (36.4 °C)  Heart Rate:  [] 71  Resp:  [17-26] 20  BP: ()/(50-90) 112/71  Body mass index is 25.77 kg/(m^2).  Flowsheet Rows         First Filed Value    Admission Height  182.9 cm (72\") Documented at 02/11/2018 2137    Admission Weight  86.2 kg (190 lb) Documented at 02/11/2018 2137        Vitals:    02/12/18 0904   BP: 112/71   Pulse: 71   Resp:    Temp:    SpO2: 99%       Head:    Normocephalic, without obvious abnormality, atraumatic   Eyes:   "          Lids and lashes normal, conjunctivae and sclerae normal, no   icterus, no pallor   Ears:    Ears appear intact with no abnormalities noted   Throat:   No oral lesions, dentition good   Neck:   No adenopathy, supple, trachea midline, no thyromegaly, no   carotid bruit, no JVD   Lungs:     Breath sounds are equal and clear to auscultation    Heart:    Normal S1 and S2, RRR, 3/6 systolic ejection murmur 2/6 diastolic decrescendo murmur/G/R   Abdomen:     Normal bowel sounds, no masses, no organomegaly, soft        non-tender, non-distended, no guarding   Extremities:   Moves all extremities well, no edema, no cyanosis, no redness   Pulses:   Pulses palpable and equal bilaterally.    Skin:  Psychiatric:   No bleeding, bruising or rash    Awake, alert and oriented x 3, normal mood and affect                 I personally viewed and interpreted the patient's EKG/Telemetry data    Assessment:  Active Hospital Problems (** Indicates Principal Problem)    Diagnosis Date Noted   • **Septic shock [A41.9, R65.21] 2018   • Sepsis [A41.9] 2018   • Pneumonia [J18.9] 2018   • Abnormal LFTs [R79.89] 2018   • JAELYN (acute kidney injury) [N17.9] 2018   • Atrial fibrillation [I48.91] 01/15/2018   • Coronary artery disease involving native coronary artery of native heart without angina pectoris [I25.10] 2017   • Secondary adrenal insufficiency [E27.49] 06/10/2016   • 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 [E23.6] 06/10/2016   • Secondary hypothyroidism [E03.8] 06/10/2016      Resolved Hospital Problems    Diagnosis Date Noted Date Resolved   No resolved problems to display.       Plan:This is a gentleman with severe valvular cardiac disease prior bypass not amenable to revascularization or valve surgery due to his overall decline his wife  a little over a year ago and he has really declined since then with tremendous weight loss and debilitation now having more frequent hospital admissions  he has not had falls that we know of him we'll get a be conservative management here I did talk with him and he is a DNR.  We will clinically he appears to be dry although his proBNP is very high we will treat him not in the ICU    Bony Figueroa MD  02/12/18  9:24 AM.

## 2018-02-12 NOTE — ED NOTES
"Pt from home. Family called EMS for \"heart racing.\" When EMS arrived, c/o SOA. EKG en route showed A fib, RVR.      Niesha Mejía RN  02/11/18 2128    "

## 2018-02-12 NOTE — PROGRESS NOTES
"Pharmacokinetic Consult - Vancomycin Dosing (Initial Note)    Jesse VÍCTOR Taylor has been consulted for pharmacy to dose vancomycin for sepsis x 7 days.  Pharmacy dosing vancomycin per Dr Arteaga's request.   Goal trough: 15-20 mg/L   Other antimicrobials: zosyn    Recent hospitalization    Relevant clinical data and objective history reviewed:  82 y.o. male 182.9 cm (72\") 86.2 kg (190 lb)    Past Medical History:   Diagnosis Date   • Aortic stenosis     7/12 mod to severe with GISELE of 1 on echo; 11/14 severe AS 0.75; nl LVSF; severe AI; BNP nl   • Atherosclerotic heart disease of native coronary artery without angina pectoris    • Basal cell carcinoma of skin 09/24/2014    behind ear   • Benign prostatic hypertrophy    • Bony sclerosis    • Claustrophobia    • Disease of thyroid gland    • Edema    • Glaucoma    • Growth hormone deficiency    • Hematoma of arm     resolved 24Nov2015   • 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    • Hyperlipidemia    • Hypertension    • Hypogonadism, male    • Hypopituitarism    • IgA monoclonal gammopathy    • Lacunar stroke    • Low testosterone    • Monoclonal gammopathy of undetermined significance    • Obesity    • 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    Lab Results   Component Value Date    WBC 9.29 02/11/2018     Temp Readings from Last 3 Encounters:   02/12/18 96.7 °F (35.9 °C) (Tympanic)   01/14/18 97.7 °F (36.5 °C) "   07/10/17 97.7 °F (36.5 °C)      Baseline culture/source/susceptibility: pending.     Assessment/Plan  Will give a loading dose of 20mg/kg (1750mg) and then start vancomycin 1500mg IV q24h. Will monitor serum creatinine every 24 hours for the first 3 days then at least every 48 hours per dosing recommendations. Vancomycin level 2/15 @ 0830. Pharmacy will continue to follow daily while on vancomycin and adjust as needed.     Angelina Craig, [facility]

## 2018-02-12 NOTE — PROGRESS NOTES
Pedro Luis Arteaga MD                          [phone]      Patient ID:    Name:  Jesse Taylor    MRN:  [id number]    1935   82 y.o.  male            Patient Care Team:  Justin Puga MD as PCP - General (Endocrinology)  Bony Figueroa MD as Consulting Physician (Cardiology)  Judah Gonzales MD as Surgeon (Neurosurgery)  Chino Fajardo II, MD as Consulting Physician (Hematology and Oncology)    LOS: 1    CC/Reason for visit:     Subjective: Pt seen and examined this AM. No acute overnight events noted. Doing better.  He did not require vasopressor support and he has been downgraded from ICU level of care.      ROS:   Denies any fevers/ chills/ CP/ palpitations/ Nausea/ vomiting/ Diarrhoea  No Worsening cough or SOA.     Objective     Vital Signs past 24hrs    BP range: BP: ()/(50-90) 115/66  Pulse range: Heart Rate:  [] 73  Resp rate range: Resp:  [17-26] 19  Temp range: Temp (24hrs), Av.6 °F (37 °C), Min:96.5 °F (35.8 °C), Max:103.1 °F (39.5 °C)      Ventilator/Non-Invasive Ventilation Settings     None          O2 Device: nasal cannula       86.2 kg (190 lb); Body mass index is 25.77 kg/(m^2).      Intake/Output Summary (Last 24 hours) at 18 0742  Last data filed at 18 0408   Gross per 24 hour   Intake             6822 ml   Output              150 ml   Net             6672 ml       Exam:    GEN:  Elderly white male in no acute distress.  EYES:   EOM-i, anicteric sclera bilat  ENT:    External ears/nose normal, OP clear  NECK:  Supple, midline trachea, No JVD or cervical LApathy  LUNGS: Bilateral breath sounds heard, decreased pulses of the   bases occasional crackles  CV:  Regular rate and rhythm, No murmur  ABD:  Non tender, no distended, no palpable liver or masses  EXT:  No cyanosis or clubbing, 1+ peripheral/sacral edema    Scheduled meds:    azithromycin 500 mg Intravenous Q24H   digoxin 125 mcg Oral Daily   heparin (porcine)  5,000 Units Subcutaneous Q8H   hydrocortisone sodium succinate 100 mg Intravenous Q8H   levothyroxine sodium 100 mcg Intravenous Daily   piperacillin-tazobactam 3.375 g Intravenous Q8H       IV meds:                        norepinephrine 0.02-0.3 mcg/kg/min Last Rate: Stopped (02/12/18 0014)   Pharmacy to dose vancomycin         Data Review:        Results from last 7 days  Lab Units 02/12/18  0603 02/11/18 2228 02/11/18 2156 02/06/18  1555   SODIUM mmol/L 140  --  142 140   POTASSIUM mmol/L 4.7  --  4.7 4.3   CHLORIDE mmol/L 102  --  102 97*   CO2 mmol/L 25.1  --  23.8 30.1*   BUN mg/dL 26*  --  25* 29*   CREATININE mg/dL 1.51*  --  1.53* 1.27   CALCIUM mg/dL 7.9*  --  8.7 9.2   BILIRUBIN mg/dL  --   --  1.4*  --    ALK PHOS U/L  --   --  176*  --    ALT (SGPT) U/L  --   --  38  --    AST (SGOT) U/L  --   --  55*  --    GLUCOSE mg/dL 103*  --  94 106*   WBC 10*3/mm3  --  9.29  --   --    HEMOGLOBIN g/dL  --  15.6  --   --    PLATELETS 10*3/mm3  --  117*  --   --    INR   --   --  1.26*  --    PROBNP pg/mL  --   --  74829.0*  --    PROCALCITONIN ng/mL  --   --  0.96*  --        Lab Results   Component Value Date    CALCIUM 7.9 (L) 02/12/2018               Results from last 7 days  Lab Units 02/12/18  0603 02/11/18 2156   TROPONIN T ng/mL 0.030 0.039*       Results Review:    I have reviewed the available laboratory results and reviewed the chest imaging personally    Imaging Results (all)     Procedure Component Value Units Date/Time    XR Chest 1 View [[id number]] Collected:  02/11/18 2212     Updated:  02/11/18 2212    Narrative:       X-RAY CHEST 1 VIEW.     HISTORY: Fever, shortness of breath.     COMPARISON: 07/10/2017.     FINDINGS:  Mild cardiomegaly and low lung volumes. Calcified lymph nodes in the  mediastinum and right hilum. Calcified granulomas bilaterally.         There is no consolidation or effusion.              Impression:       No acute findings.                   ASSESSMENT:   1. Septic Shock -  resolved  2. HMPV infection with probable superimposed bacterial pneumonia  3. Acute hypoxic respiratory failure  4. Mixed respiratory alkalosis and metabolic acidosis  5. Lactic acidosis  6. Elevated transaminase, mild, secondary to sepsis  7. JAELYN on CKD  8. Troponin elevation, mild, likely secondary to sepsis and CKD  9. Severe AS/MR/ Moderate AR - not a surgical candidate  10. Chronic Systolic CHF (EF - 35%)  11. H/o Afib not on AC.    PLAN:  Patient responded well to the antibiotic and IV fluid boluses.  We will continue with broad-spectrum antibiotic regimen at this point  Discussed with the pharmacist regarding the current plan of antibiotic.  We'll continue with vancomycin/Zosyn/Levaquin.  Wean supplemental oxygen as tolerated.  Physical therapy/incentive spirometry and Flutter valve  DNR     I have discussed my findings and recommendations with patient and nursing staff.     Pedro Luis Arteaga MD  2/12/2018

## 2018-02-12 NOTE — CONSULTS
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  He reported being sick with flulike symptoms over the last week which has been progressively getting worse.  He has cough productive of whitish phlegm associated with fever and shortness of breath on minimal activity.  He does not usually use oxygen at home.  He lives with his son and daughter-in-law who called EMS and patient was transferred to the ED.  Patient is poor historian and was not able to provide further information.  Family not available.    Patient was last hospitalized in January for shortness of breath attributed to his new A. fib and discharged 1/15/18.  I reviewed the discharge summary by Dr. Figueroa.    In ED, patient was placed on oxygen 2 L/m.  He was initially hemodynamically stable with normal blood pressure but tachycardic. HLA was contacted for admission.  Later on, his blood pressure dropped to a lowest of 80/61. MAXIMUM TEMPERATURE 103.1. He received 30 ml/kg IV bolus, Rocephin and azithromycin. He  He also received a dose of Digoxin to 50 µg.        Labs reviewed and are pertinent for the following:  ABG = 7.43/30/78 on oxygen 2 L; proBNP = 86013; troponin = 0.039; creatinine = 1.53 (at baseline); AST = 55; bilirubin = 1.4; lactic acid = 3.7; pro-calcitonin = 0.96    Echocardiogram on 1/12/18:  EF = 34%; severe aortic stenosis, moderate aortic regurg and severe mitral regurg.  RVSP = 45-55       Review of Systems:  Constitutional: Fever and chills  ENMT: No sinus congestion  Cardiovascular: No chest pain, palpitation or legs swelling.     Respiratory: Dyspnea and cough productive of whitish phlegm  Gastrointestinal: No constipation, diarrhea or abdominal pain   Neurology: No headache, weakness, numbness or dizziness.   Musculoskeletal: No joints pain, stiffness or swelling.   Psychiatry: No depression.  Lymphatic: No swollen glands.  Integumentary: No rash.    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    • Hyperlipidemia    • Hypertension    • Hypogonadism, male    • Hypopituitarism    • IgA monoclonal gammopathy    • Lacunar stroke    • Low testosterone    • Monoclonal gammopathy of undetermined significance    • Obesity    • 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   • OTHER SURGICAL HISTORY  08/05/2013    Carotid thromboendarterectomy right,  8/13   • SKIN BIOPSY     • TONSILLECTOMY     • TRANSPHENOIDAL / TRANSNASAL HYPOPHYSECTOMY / RESECTION PITUITARY TUMOR      Benign tumor removed     Family History   Problem Relation Age of Onset   • Cancer Sister      Brain cancer   • Asthma Son    • Allergy (severe) Son     • Heart disease Mother    • No Known Problems Brother    • No Known Problems Sister    • No Known Problems Sister    • No Known Problems Sister    • No Known Problems Sister    • No Known Problems Sister    • Brain cancer Sister    • No Known Problems Sister    • No Known Problems Sister    • No Known Problems Sister    • No Known Problems Brother      Social History   Substance Use Topics   • Smoking status: Former Smoker   • Smokeless tobacco: Never Used      Comment: caffeine use   • Alcohol use No       (Not in a hospital admission)  Allergies:  Somatropin    Vital Signs  Temp:  [103.1 °F (39.5 °C)] 103.1 °F (39.5 °C)  Heart Rate:  [110-142] 142  Resp:  [20] 20  BP: ()/(54-82) 83/54    Physical Exam:  Constitutional: Not in acute distress.  Audible rhonchi  Eyes: Injected conjunctiva, EOMI.  ENMT: Nath 3. No oral thrush.   Heart: Irregular rhythm.  Normal rate.  No audible murmur but the examination was obscured by significant rhonchi  Lungs: Equal air entry bilaterally but diffuse bilateral rhonchi.  No wheezing         Abdomen: Obese. Soft. No tenderness or dullness.  Extremities: No cyanosis or clubbing but trace pitting edema in legs with scaling of the skin and mild erythema in lower legs  Neuro: Conscious, alert, oriented x3  Psych: Appropriate mood and affect.    Integumentary: No rash  Lymphatic: No palpable cervical or supraclavicular lymph nodes.            Diagnostic imaging:  I personally and independently reviewed the following images:   Compared to prior exam, there is elevation of the left hemidiaphragm.  Stable bilateral granulomatous disease          Assessment and Plan:  1. Severe sepsis, source likely pneumonia  2. Human metapneumovirus infection with probable superimposed bacterial pneumonia  3. Acute hypoxic respiratory failure  4.  Mixed respiratory alkalosis and metabolic acidosis  5. Lactic acidosis  6. Elevated transaminase, mild, secondary to sepsis  7. CKD, at  baseline  8. Troponin elevation, mild, likely secondary to sepsis and CKD  9. Severe aortic stenosis, not a surgical candidate per her prior cardiology evaluation  10. Systolic CHF, now euvolemic    · Broad spectrum Antibiotics given his recent hospitalization  · Caution with IV fluid administration because of CHF and severe aortic stenosis. He has already received 30ml/kg IV fluid  · Focused sepsis exam revaled MAP>65 and capillary refill<3 sec consistent with adequate fluid resuscitation  · Levophed for MAP<65. He has not required it yet.   · Agree with stress dose steroid  · Urine strep Ag  · NPO. Swalow evaluation  · Serial lactic acid and Troponin  · Dig and Levothyroxine IV while NPO        I discussed the patients findings and my recommendations with patient and nursing staff.     Anita Muro MD  02/11/18  11:12 PM    Time: Critical care 35 min      This note was dictated utilizing [place] dictation

## 2018-02-12 NOTE — ED PROVIDER NOTES
EMERGENCY DEPARTMENT ENCOUNTER    CHIEF COMPLAINT  Chief Complaint: palpitations  History given by: pt, EMS  History limited by: nothing  Room Number: 15/15  PMD: Justin Puga MD      HPI:  Pt is a 82 y.o. male who presents complaining of palpitations PTA. Family told EMS the pt's heart was racing. EMS reports the pt has had poor appetite, fever, and SOA. The pt admits to a cough, vomiting, and diarrhea. The pt has gotten a flu shot this year. Pt received a Duo-Neb en route.    Duration:  PTA  Onset: gradual  Timing: constant  Location: chest  Radiation: none  Quality: heart racing  Intensity/Severity: moderate  Progression: unchanged  Associated Symptoms: poor appetite, fever, SOA, cough, vomiting, diarrhea  Aggravating Factors: none  Alleviating Factors: none  Previous Episodes: not known  Treatment before arrival: Pt received a Duo-Neb en route.    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 06/10/2016   • Hyperlipidemia 06/10/2016   • Type 2 diabetes mellitus without complication, without long-term current use of insulin 06/10/2016   • Carotid artery disease 06/10/2016   • Hypopituitarism 06/10/2016   • Secondary hypothyroidism 06/10/2016   • Secondary adrenal insufficiency 06/10/2016   • Vitamin D deficiency 09/12/2017   • Abnormal results of function studies of other organs and systems  09/12/2017   • Nonrheumatic aortic valve insufficiency 10/19/2017   • Coronary artery disease involving native coronary artery of native heart without angina pectoris 11/22/2017   • Aortic stenosis 01/11/2018   • Atrial fibrillation 01/15/2018     Resolved Ambulatory Problems      Diagnosis Date Noted   • No Resolved Ambulatory Problems     Past Medical History:   Diagnosis Date   • Aortic stenosis    • Atherosclerotic heart disease of native coronary artery without angina pectoris    • Basal cell carcinoma of skin 09/24/2014   • Benign prostatic hypertrophy    • Bony sclerosis    • Claustrophobia    • Disease of thyroid gland    • Edema    • Glaucoma    • Growth hormone deficiency    • Hematoma of arm    • 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    • Hyperlipidemia    • Hypertension    • Hypogonadism, male    • Hypopituitarism    • IgA monoclonal gammopathy    • Lacunar stroke    • Low testosterone    • Monoclonal gammopathy of undetermined significance    • Obesity    • 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   • OTHER SURGICAL HISTORY  08/05/2013    Carotid thromboendarterectomy right,  8/13   • SKIN BIOPSY     • TONSILLECTOMY     • TRANSPHENOIDAL / TRANSNASAL HYPOPHYSECTOMY / RESECTION PITUITARY TUMOR      Benign tumor removed       FAMILY HISTORY  Family History   Problem Relation Age of Onset   • Cancer Sister      Brain cancer   • Asthma Son    • Allergy (severe) Son    • Heart disease Mother    • No Known Problems Brother    • No Known Problems Sister    • No Known Problems Sister    • No Known Problems Sister    • No Known Problems Sister    • No Known Problems Sister    • Brain cancer Sister    • No Known Problems Sister    • No Known Problems Sister    • No Known Problems Sister    • No Known Problems Brother         SOCIAL HISTORY  Social History     Social History   • Marital status:      Spouse name: N/A   • Number of children: N/A   • Years of education: N/A     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[employer]     Social History Main Topics   • Smoking status: Former Smoker   • Smokeless tobacco: Never Used      Comment: caffeine use   • Alcohol use No   • Drug use: No   • Sexual activity: Defer     Other Topics Concern   • Not on file     Social History Narrative       ALLERGIES  Somatropin    REVIEW OF SYSTEMS  Review of Systems   Constitutional: Positive for appetite change (loss) and fever. Negative for activity change.   HENT: Negative for congestion and sore throat.    Eyes: Negative.    Respiratory: Positive for cough and shortness of breath.    Cardiovascular: Positive for palpitations (heart racing). Negative for chest pain and leg swelling.   Gastrointestinal: Positive for diarrhea and vomiting. Negative for abdominal pain.   Endocrine: Negative.    Genitourinary: Negative for decreased urine volume and dysuria.   Musculoskeletal: Negative for neck pain.   Skin: Negative for rash and wound.   Allergic/Immunologic: Negative.    Neurological: Negative for weakness, numbness and headaches.   Hematological: Negative.    Psychiatric/Behavioral: Negative.    All other systems reviewed and are negative.      PHYSICAL EXAM  ED Triage Vitals   Temp Pulse Resp BP SpO2   02/11/18 2136 -- -- -- --   103.1 °F (39.5 °C)          Temp src Heart Rate Source Patient Position BP Location FiO2 (%)   02/11/18 2136 -- -- -- --   Tympanic           Physical Exam   Constitutional: He has a sickly appearance. He appears distressed.   HENT:   Head: Normocephalic and atraumatic.   Eyes: EOM are normal. Pupils are equal, round, and reactive to light.   Neck: Normal range of motion. Neck supple.   Cardiovascular: Regular rhythm and normal heart sounds.  Tachycardia present.    Pulmonary/Chest: Effort normal.  Tachypnea noted. No respiratory distress. He has decreased breath sounds (bilaterally). He has rhonchi. He has rales.   Abdominal: Soft. There is no tenderness. There is no rebound and no guarding.   Musculoskeletal: Normal range of motion. He exhibits no edema.   Neurological: He is alert. He has normal sensation and normal strength.   Slow to respond   Skin: Skin is warm and dry.   Psychiatric: Mood and affect normal.   Nursing note and vitals reviewed.      LAB RESULTS  Lab Results (last 24 hours)     Procedure Component Value Units Date/Time    CBC & Differential [[id number]] Collected:  02/11/18 2156    Specimen:  Blood Updated:  02/11/18 2242    Narrative:       The following orders were created for panel order CBC & Differential.  Procedure                               Abnormality         Status                     ---------                               -----------         ------                     Scan Slide[[id number]]                                                                  CBC Auto Differential[[id number]]        Abnormal            Final result                 Please view results for these tests on the individual orders.    Comprehensive Metabolic Panel [[id number]]  (Abnormal) Collected:  02/11/18 2156    Specimen:  Blood Updated:  02/11/18 2250     Glucose 94 mg/dL      BUN 25 (H) mg/dL      Creatinine 1.53 (H) mg/dL      Sodium 142 mmol/L      Potassium 4.7 mmol/L       Specimen hemolyzed.  Results may be affected.        Chloride 102 mmol/L      CO2 23.8 mmol/L      Calcium 8.7 mg/dL      Total Protein 8.3 g/dL      Albumin 3.40 (L) g/dL      ALT (SGPT) 38 U/L       Specimen hemolyzed.  Results may be affected.        AST (SGOT) 55 (H) U/L       Specimen hemolyzed.  Results may be affected.        Alkaline Phosphatase 176 (H) U/L      Total Bilirubin 1.4 (H) mg/dL      eGFR Non African Amer 44 (L) mL/min/1.73      Globulin 4.9 gm/dL      A/G Ratio 0.7 g/dL      BUN/Creatinine Ratio 16.3      Anion Gap 16.2 mmol/L     Narrative:       The MDRD GFR formula is only valid for adults with stable renal function between ages 18 and 70.    Protime-INR [[id number]]  (Abnormal) Collected:  02/11/18 2156    Specimen:  Blood Updated:  02/11/18 2225     Protime 15.5 (H) Seconds      INR 1.26 (H)    BNP [[id number]]  (Abnormal) Collected:  02/11/18 2156    Specimen:  Blood Updated:  02/11/18 2248     proBNP 85132.0 (H) pg/mL     Narrative:       Among patients with dyspnea, NT-proBNP is highly sensitive for the detection of acute congestive heart failure. In addition NT-proBNP of <300 pg/ml effectively rules out acute congestive heart failure with 99% negative predictive value.    Troponin [[id number]]  (Abnormal) Collected:  02/11/18 2156    Specimen:  Blood Updated:  02/11/18 2250     Troponin T 0.039 (H) ng/mL       Specimen hemolyzed.  Results may be affected.       Narrative:       Troponin T Reference Ranges:  Less than 0.03 ng/mL:    Negative for AMI  0.03 to 0.09 ng/mL:      Indeterminant for AMI  Greater than 0.09 ng/mL: Positive for AMI    Blood Culture - Blood, [[id number]] Collected:  02/11/18 2156    Specimen:  Blood from Arm, Right Updated:  02/11/18 2203    Blood Culture - Blood, [[id number]] Collected:  02/11/18 2156    Specimen:  Blood from Arm, Right Updated:  02/11/18 2203    Lactic Acid, Plasma [[id number]]  (Abnormal) Collected:  02/11/18 2156    Specimen:  Blood Updated:  02/11/18 2244     Lactate 3.7 (C) mmol/L     Procalcitonin [[id number]]  (Abnormal) Collected:  02/11/18 2156    Specimen:  Blood Updated:  02/11/18 2250     Procalcitonin 0.96 (C) ng/mL     Narrative:       As a Marker for Sepsis (Non-Neonates):   1. <0.5 ng/mL represents a low risk of severe sepsis and/or septic shock.  1. >2 ng/mL represents a high risk of severe sepsis and/or septic shock.    As a Marker for Lower Respiratory Tract Infections that require antibiotic therapy:  PCT on Admission     Antibiotic Therapy              "6-12 Hrs later  > 0.5                Strongly Recommended            >0.25 - <0.5         Recommended  0.1 - 0.25           Discouraged                   Remeasure/reassess PCT  <0.1                 Strongly Discouraged          Remeasure/reassess PCT      As 28 day mortality risk marker: \"Change in Procalcitonin Result\" (> 80 % or <=80 %) if Day 0 (or Day 1) and Day 4 values are available. Refer to [email or web address]   Change in PCT <=80 %   A decrease of PCT levels below or equal to 80 % defines a positive change in PCT test result representing a higher risk for 28-day all-cause mortality of patients diagnosed with severe sepsis or septic shock.  Change in PCT > 80 %   A decrease of PCT levels of more than 80 % defines a negative change in PCT result representing a lower risk for 28-day all-cause mortality of patients diagnosed with severe sepsis or septic shock.                Digoxin Level [[id number]]  (Abnormal) Collected:  02/11/18 2156    Specimen:  Blood Updated:  02/11/18 2245     Digoxin 0.30 (L) ng/mL     Lactic Acid, Reflex Timer (This will reflex a repeat order 3-3:15 hours after ordered.) [[id number]] Collected:  02/11/18 2156    Specimen:  Blood Updated:  02/11/18 2244    Influenza Antigen, Rapid - Swab, Nasopharynx [[id number]]  (Normal) Collected:  02/11/18 2210    Specimen:  Swab from Nasopharynx Updated:  02/11/18 2235     Influenza A Ag, EIA Negative     Influenza B Ag, EIA Negative    CBC Auto Differential [[id number]]  (Abnormal) Collected:  02/11/18 2228    Specimen:  Blood Updated:  02/11/18 2242     WBC 9.29 10*3/mm3      RBC 4.59 (L) 10*6/mm3      Hemoglobin 15.6 g/dL      Hematocrit 47.8 %      .1 (H) fL      MCH 34.0 (H) pg      MCHC 32.6 g/dL      RDW 13.8 %      RDW-SD 52.5 fl      MPV 12.6 (H) fL      Platelets 117 (L) 10*3/mm3      Neutrophil % 73.7 %      Lymphocyte % 18.2 (L) %      Monocyte % 7.1 %      Eosinophil % 0.4 %      Basophil % 0.4 %      Immature " Grans % 0.2 %      Neutrophils, Absolute 6.84 10*3/mm3      Lymphocytes, Absolute 1.69 10*3/mm3      Monocytes, Absolute 0.66 10*3/mm3      Eosinophils, Absolute 0.04 10*3/mm3      Basophils, Absolute 0.04 10*3/mm3      Immature Grans, Absolute 0.02 10*3/mm3           I ordered the above labs and reviewed the results    RADIOLOGY  XR Chest 1 View   Preliminary Result   No acute findings.                   I ordered the above noted radiological studies. Interpreted by radiologist. Reviewed by me in PACS.       PROCEDURES  Critical Care  Performed by: MALIA GONZALEZ  Authorized by: MALIA GONZALEZ     Critical care provider statement:     Critical care time (minutes):  40    Critical care was necessary to treat or prevent imminent or life-threatening deterioration of the following conditions:  Sepsis    Critical care was time spent personally by me on the following activities:  Blood draw for specimens, development of treatment plan with patient or surrogate, evaluation of patient's response to treatment, examination of patient, obtaining history from patient or surrogate, interpretation of cardiac output measurements, review of old charts, re-evaluation of patient's condition, pulse oximetry, ordering and review of radiographic studies, ordering and review of laboratory studies and ordering and performing treatments and interventions          EKG           EKG time: 2149  Rhythm/Rate: a-fib 105  QRS, axis: left axis deviation; IVCD; poor r wave progression anteriorly; anterior Q-wave present   ST and T waves: non specific T wave     Interpreted Contemporaneously by me, independently viewed  unchanged compared to prior 1/13/2018    PROGRESS AND CONSULTS  ED Course     2143 - Ordered labs, CXR, and EKG for further evaluation.    2202 - Ordered Rocephin, Zithromax, and Tylenol for infection..    2246 - Ordered Lanoxin for low Digoxin level.    2247 - Pt rechecked. Pt is resting comfortably. Notified pt of imaging which  pneumonia and labs which showed an elevated Lactate. Discussed plan to admit the pt for treatment. Pt understands and agrees with plan, all questions addressed.    2253 - Placed call to [facility] for admission.    2302 - Discussed pt's case with Dr. Parker ([facility]), who agreed to admit the pt to the ICU and requested the pt receive Solu-Cortef and Tamiflu. He also requested a Respiratory Panel be run.    2303 - Ordered Tamiflu and Solu-cortef as well as a Respiratory Panel per Dr. Parker's request.    2313 - Discussed pt's case with Dr. Muro ([facility]), who agreed to consult.    MEDICAL DECISION MAKING  Results were reviewed/discussed with the patient and they were also made aware of online access. Pt also made aware that some labs, such as cultures, will not be resulted during ER visit and follow up with PMD is necessary.     MDM  Number of Diagnoses or Management Options     Amount and/or Complexity of Data Reviewed  Clinical lab tests: reviewed and ordered (Lactate - 3.7. Digoxin - 0.30. Procalcitonin - 0.96. Troponin - 0.039. GFR - 44)  Tests in the radiology section of CPT®: reviewed and ordered (CXR - nothing acute)  Tests in the medicine section of CPT®: ordered and reviewed (See EKG procedure note)           DIAGNOSIS  Final diagnoses:   Sepsis, due to unspecified organism   Pneumonia due to infectious organism, unspecified laterality, unspecified part of lung   JAELYN (acute kidney injury)   Atrial fibrillation with RVR   Other specified hypotension       DISPOSITION  ADMISSION    Discussed treatment plan and reason for admission with pt/family and admitting physician.  Pt/family voiced understanding of the plan for admission for further testing/treatment as needed.         Latest Documented Vital Signs:  As of 11:22 PM  BP- (!) 83/54 HR- (!) 142 Temp- (!) 103.1 °F (39.5 °C) (Tympanic) O2 sat- 93%    --  Documentation assistance provided by kalina Casey for Dr. Flores.  Information recorded by the  scribe was done at my direction and has been verified and validated by me.     Axel Casey  02/11/18 8538       Bean Flores MD  02/11/18 3066

## 2018-02-12 NOTE — SIGNIFICANT NOTE
02/12/18 1606   Rehab Treatment   Discipline speech language pathologist   Rehab Evaluation   Evaluation Not Performed other (see comments)  (Pt unable to maintain alertness for swallow evaluation at this time.SLP to f/u 2/13 as pt appropriate. )

## 2018-02-12 NOTE — H&P
Internal medicine history and physical   INTERNAL MEDICINE   [facility]       Patient Identification:  Name: Jesse Taylor  Age: 82 y.o.  Sex: male  :  1935  MRN: [id number]                   Primary Care Physician: Justin Puga MD                                   Chief Complaint:  Sent to the emergency room by family members for a few days history of not feeling well and not looking good.    History of Present Illness:   Information patient himself in discussion with ER physician and review the previous hospitalization records  Patient is a 82-year-old male with a complicated past medical history as noted below was evidently doing okay until couple days ago when he started to feel weak and tired.  He says that he doesn't know why and how he got to the hospital but EMS was called by his family members for him not feeling very well.  He was having some cough and congestion as well as a feeling that his heart was racing.  He was having poor appetite weakness fever as well as vomiting and diarrhea.  Because of his respiratory pattern and wheezing he did receive a DuoNeb and Route by EMS.  In the emergency room he was found to have a temperature of 103 , elevated lactic acid level indeterminate elevation of troponin elevated BNP.  His chest x-ray did not reveal any specific abnormality and stool antigen assay was negative but because of his appearance and fever and prior symptoms of cough and decrease appetite patient is given empiric diagnosis of pneumonia in the community-acquired setting and was started on antibiotic therapy with plans to be admitted to the floor.  Patient subsequently developed blood pressure of 80 systolic requiring IV fluid bolus and pressors.  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    Past Medical  History:  Past Medical History:   Diagnosis Date   • Aortic stenosis     7/12 mod to severe with GISELE of 1 on echo; 11/14 severe AS 0.75; nl LVSF; severe AI; BNP nl   • Atherosclerotic heart disease of native coronary artery without angina pectoris    • Basal cell carcinoma of skin 09/24/2014    behind ear   • Benign prostatic hypertrophy    • Bony sclerosis    • Claustrophobia    • Disease of thyroid gland    • Edema    • Glaucoma    • Growth hormone deficiency    • Hematoma of arm     resolved [id number]   • 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    • Hyperlipidemia    • Hypertension    • Hypogonadism, male    • Hypopituitarism    • IgA monoclonal gammopathy    • Lacunar stroke    • Low testosterone    • Monoclonal gammopathy of undetermined significance    • Obesity    • 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   • OTHER SURGICAL HISTORY  08/05/2013    Carotid thromboendarterectomy right,  8/13   • SKIN BIOPSY     • TONSILLECTOMY     • TRANSPHENOIDAL / TRANSNASAL HYPOPHYSECTOMY / RESECTION PITUITARY TUMOR      Benign tumor removed      Home Meds:    (Not in a hospital admission)  Current Meds:     Current Facility-Administered Medications:   •  azithromycin (ZITHROMAX) 500 mg in sodium chloride 0.9 % 250 mL IVPB, 500 mg, Intravenous, Once, Bean Flores MD, Last Rate: 250 mL/hr at 02/11/18 2216, 500 mg at 02/11/18 2216  •  cefTRIAXone (ROCEPHIN) IVPB 1 g, 1 g, Intravenous, Once, Bean Flores MD  •  digoxin (LANOXIN) injection 250 mcg, 250 mcg,  Intravenous, Once, Bean Flores MD  •  hydrocortisone sod succinate PF (Solu-CORTEF) injection 100 mg, 100 mg, Intravenous, Q8H, Bean Flores MD  •  norepinephrine (LEVOPHED) 8 mg/250 mL (32 mcg/mL) in sodium chloride 0.9% infusion (premix), 0.02-0.3 mcg/kg/min, Intravenous, Titrated, Bean Flores MD  •  oseltamivir (TAMIFLU) capsule 30 mg, 30 mg, Oral, Once, Bean Flores MD  •  sodium chloride 0.9 % bolus 1,000 mL, 1,000 mL, Intravenous, Once, Bean Flores MD, Last Rate: 500 mL/hr at 02/11/18 2210, 1,000 mL at 02/11/18 2210  •  sodium chloride 0.9 % bolus 2,586 mL, 30 mL/kg, Intravenous, Continuous, Bean Flores MD  •  Insert peripheral IV, , , Once **AND** sodium chloride 0.9 % flush 10 mL, 10 mL, Intravenous, PRN, Bean Flores MD    Current Outpatient Prescriptions:   •  aspirin 81 MG EC tablet, Take 1 tablet by mouth Daily., Disp: , Rfl:   •  bimatoprost (LUMIGAN) 0.01 % ophthalmic drops, Apply 1 drop to eye nightly. Both eyes., Disp: , Rfl:   •  brimonidine (ALPHAGAN P) 0.1 % solution ophthalmic solution, Apply 1 drop to eye Every 12 (Twelve) Hours., Disp: , Rfl:   •  Cholecalciferol (VITAMIN D3) 1000 UNITS capsule, Take  by mouth., Disp: , Rfl:   •  digoxin (LANOXIN) 125 MCG tablet, Take 1 tablet by mouth Daily., Disp: 30 tablet, Rfl: 6  •  furosemide (LASIX) 40 MG tablet, Take 1 tablet by mouth Daily., Disp: 90 tablet, Rfl: 3  •  levothyroxine (SYNTHROID, LEVOTHROID) 137 MCG tablet, Take 1 tablet by mouth Daily., Disp: 30 tablet, Rfl: 6  •  Multiple Vitamin (MULTI VITAMIN DAILY PO), Take 1 tablet by mouth daily., Disp: , Rfl:   •  nitroglycerin (NITROSTAT) 0.4 MG SL tablet, 1 under the tongue as needed for angina, may repeat q5mins for up three doses, Disp: 25 tablet, Rfl: 3  •  potassium chloride (K-DUR,KLOR-CON) 20 MEQ CR tablet, Take 1 tablet by mouth Daily., Disp: 90 tablet, Rfl: 3  •  predniSONE (DELTASONE) 2.5 MG tablet, Take 2.5 mg by mouth Daily., Disp: , Rfl:   •  predniSONE (DELTASONE)  "5 MG tablet, TAKE ONE TABLET BY MOUTH EVERY MORNING AND TAKE ONE-HALF TABLET BY MOUTH EVERY EVENING, Disp: 135 tablet, Rfl: 0  •  simvastatin (ZOCOR) 40 MG tablet, Take 1 tablet by mouth Every Night., Disp: 90 tablet, Rfl: 6  •  Testosterone 20.25 MG/ACT (1.62%) gel, Apply 4 pump presses one time daily as directed, Disp: 450 g, Rfl: 1  Allergies:  Allergies   Allergen Reactions   • Somatropin Other (See Comments)     ARTHRALGIA     Social History:   Social History   Substance Use Topics   • Smoking status: Former Smoker   • Smokeless tobacco: Never Used      Comment: caffeine use   • Alcohol use No      Family History:  Family History   Problem Relation Age of Onset   • Cancer Sister      Brain cancer   • Asthma Son    • Allergy (severe) Son    • Heart disease Mother    • No Known Problems Brother    • No Known Problems Sister    • No Known Problems Sister    • No Known Problems Sister    • No Known Problems Sister    • No Known Problems Sister    • Brain cancer Sister    • No Known Problems Sister    • No Known Problems Sister    • No Known Problems Sister    • No Known Problems Brother           Review of Systems  See history of present illness and past medical history. Limited by him appearing very ill and giving very vague responses to questions asked.      Vitals:   /71  Pulse (!) 128  Temp (!) 103.1 °F (39.5 °C) (Tympanic)   Resp 20  Ht 182.9 cm (72\")  Wt 86.2 kg (190 lb)  SpO2 96%  BMI 25.77 kg/m2  I/O: No intake or output data in the 24 hours ending 02/11/18 2305  Exam:  General Appearance:    Toxic and ill appearing male who is in no acute distress    Head:    Normocephalic, without obvious abnormality, atraumatic   Eyes:    PERRL, conjunctiva/corneas clear, EOM's intact, both eyes   Ears:    Normal external ear canals, both ears   Nose:   Nares normal, septum midline, mucosa normal, no drainage    or sinus tenderness   Throat:   Dry oral mucosa    Neck:   Supple, symmetrical, trachea midline, " no adenopathy;     thyroid:  no enlargement/tenderness/nodules; no carotid    bruit or JVD   Back:     Symmetric, no curvature, ROM normal, no CVA tenderness   Lungs:     Scattered rhonchi bilaterally decreased breath sounds at the bases.  crackles at the left lung base    Chest Wall:    No tenderness or deformity    Heart:    Irregularly irregular 2/6 systolic and diastolic murmur noted    Abdomen:     Soft, non-tender, bowel sounds active all four quadrants,     no masses, no hepatomegaly, no splenomegaly   Extremities:   Chronic changes in the lower extremities but no obvious ulcers or cellulitis    Pulses:   Pulses palpable in all extremities; symmetric all extremities   Skin:   Skin color normal, Skin is warm and dry,  no rashes or palpable lesions   Neurologic:   Grossly nonfocal, oriented to place and name but not to time.       Data Review:      I reviewed the patient's new clinical results.    Results from last 7 days  Lab Units 02/11/18  2228   WBC 10*3/mm3 9.29   HEMOGLOBIN g/dL 15.6   PLATELETS 10*3/mm3 117*       Results from last 7 days  Lab Units 02/11/18  2156 02/06/18  1555   SODIUM mmol/L 142 140   POTASSIUM mmol/L 4.7 4.3   CHLORIDE mmol/L 102 97*   CO2 mmol/L 23.8 30.1*   BUN mg/dL 25* 29*   CREATININE mg/dL 1.53* 1.27   CALCIUM mg/dL 8.7 9.2   GLUCOSE mg/dL 94 106*     Chest x-ray reviewed.  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       Plan:  Septic shock - multifactorial based on combination of elevated lactic level, high fever and underlying pneumonia based on clinical grounds even though x-rays negative physical findings are very concerning and underlying adrenal insufficiency because of pituitary resection and chronic use of steroids.  Plan is to give him IV fluid bolus provide him with pressors intensivist consultation and follow sepsis protocol for community-acquired pneumonia with Zosyn and Levaquin.  Patient may go to the unit.  Check respiratory viral panel and empirically started on Tamiflu.  Abnormal LFTs-multifactorial likely due to sepsis but could very well be primary biliary process plan IV antibiotics and monitor LFTs while supporting and maintaining hemodynamics.  Atrial fibrillation with rapid ventricular response likely reactive and partly explains palpitation plan is to monitor in ICU and neurology consultation.  Hypopituitarism-plan is to replace steroids and thyroid hormone.  Acute kidney injury on chronic kidney disease multifactorial plan hold nephrotoxic agents as well as hydrate carefully.  History of congestive heart failure with aortic insufficiency currently appears to be volume depleted-IV fluids as per sepsis protocol with close monitoring of his hemodynamics and volume status.  Consult cardiology.  Trey Parker MD   2/11/2018  11:05 PM  Much of this encounter note is an electronic transcription/translation of spoken language to printed text. The electronic translation of spoken language may permit erroneous, or at times, nonsensical words or phrases to be inadvertently transcribed; Although I have reviewed the note for such errors, some may still exist

## 2018-02-12 NOTE — ED NOTES
Dr. Muro turned off 02 per NC for RA sats. Pt dropped to 87% after 8minutes.  O2 2L NC placed back on pt at this time.     Vera Guillory RN  02/12/18 0206

## 2018-02-12 NOTE — PROGRESS NOTES
"   LOS: 1 day   Patient Care Team:  Justin Puga MD as PCP - General (Endocrinology)  Bony Figueroa MD as Consulting Physician (Cardiology)  Judah Gonzales MD as Surgeon (Neurosurgery)  Chino Fajardo II, MD as Consulting Physician (Hematology and Oncology)    Chief Complaint: tired    Subjective     HPI Comments: Feeling much better this AM. Feeling a little tired but otherwise has no complaints    Fever    Associated symptoms include coughing. Pertinent negatives include no chest pain, diarrhea, nausea or vomiting.   Shortness of Breath   Pertinent negatives include no chest pain, fever or vomiting.       Subjective:  Symptoms:  Improved.  He reports malaise, cough and weakness.  No shortness of breath, chest pain, headache, chest pressure, anorexia, diarrhea or anxiety.    Diet:  Poor intake.  No nausea or vomiting.    Activity level: Impaired due to weakness.    Pain:  He reports no pain.        History taken from: patient chart    Objective     Vital Signs  Temp:  [94.1 °F (34.5 °C)-103.1 °F (39.5 °C)] 94.1 °F (34.5 °C)  Heart Rate:  [] 74  Resp:  [17-26] 18  BP: ()/(50-90) 105/69    Objective:  General Appearance:  Comfortable and in no acute distress.    Vital signs: (most recent): Blood pressure 105/69, pulse 74, temperature 94.1 °F (34.5 °C), temperature source Oral, resp. rate 18, height 182.9 cm (72\"), weight 37.7 kg (83 lb 3.2 oz), SpO2 97 %.  Vital signs are normal.  No fever.    Output: Producing urine and no stool output.    HEENT: Normal HEENT exam.    Lungs:  Normal respiratory rate and normal effort.  Breath sounds clear to auscultation.  (Anteriorly)  Heart: Normal rate.  Regular rhythm.  Positive for murmur.    Abdomen: Abdomen is soft.  Bowel sounds are normal.   There is no abdominal tenderness.     Extremities: There is no dependent edema.    Pulses: Distal pulses are intact.    Neurological: Patient is alert and oriented to person, place and time.    Pupils:  Pupils are " equal, round, and reactive to light.    Skin:  Warm and dry.  (Venous stasis changes BLE)          Results Review:     I reviewed the patient's new clinical results.  I reviewed the patient's new imaging results and agree with the interpretation.  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    Type 2 diabetes mellitus without complication, without long-term current use of insulin    Secondary hypothyroidism    Secondary adrenal insufficiency    Coronary artery disease involving native coronary artery of native heart without angina pectoris    Aortic stenosis    Atrial fibrillation    Pneumonia (human metapneumovirus with secondary bacterial component)    Abnormal LFTs    Chronic systolic congestive heart failure    Stage 3 chronic kidney disease    Acute respiratory failure with hypoxia          Plan:   (Continue Zithromax/Zosyn/Vancomycin  Continue IVFs per Card and Pulm  Watch for volume overload given pt's cardiac history  Will ask pt's endocrinologist to follow (Dr. Puga) given his hypopituitarism  Long d/w pt regarding DNR status, he confirms conditional code with me as he did with Dr. Figueroa).       Chava Roldan MD  02/12/18  11:56 AM    Time: 30min

## 2018-02-13 LAB
25(OH)D3 SERPL-MCNC: 39.1 NG/ML (ref 30–100)
ALBUMIN SERPL-MCNC: 2.6 G/DL (ref 3.5–5.2)
ALBUMIN/GLOB SERPL: 0.7 G/DL
ALP SERPL-CCNC: 116 U/L (ref 39–117)
ALT SERPL W P-5'-P-CCNC: 27 U/L (ref 1–41)
ANION GAP SERPL CALCULATED.3IONS-SCNC: 10.2 MMOL/L
AST SERPL-CCNC: 29 U/L (ref 1–40)
BILIRUB SERPL-MCNC: 1 MG/DL (ref 0.1–1.2)
BUN BLD-MCNC: 33 MG/DL (ref 8–23)
BUN/CREAT SERPL: 24.4 (ref 7–25)
CALCIUM SPEC-SCNC: 7.8 MG/DL (ref 8.6–10.5)
CHLORIDE SERPL-SCNC: 103 MMOL/L (ref 98–107)
CHOLEST SERPL-MCNC: 86 MG/DL (ref 0–200)
CO2 SERPL-SCNC: 23.8 MMOL/L (ref 22–29)
CREAT BLD-MCNC: 1.35 MG/DL (ref 0.76–1.27)
GFR SERPL CREATININE-BSD FRML MDRD: 51 ML/MIN/1.73
GLOBULIN UR ELPH-MCNC: 3.9 GM/DL
GLUCOSE BLD-MCNC: 131 MG/DL (ref 65–99)
HDLC SERPL-MCNC: 23 MG/DL (ref 40–60)
LDLC SERPL CALC-MCNC: 45 MG/DL (ref 0–100)
LDLC/HDLC SERPL: 1.96 {RATIO}
POTASSIUM BLD-SCNC: 3.9 MMOL/L (ref 3.5–5.2)
PROT SERPL-MCNC: 6.5 G/DL (ref 6–8.5)
SODIUM BLD-SCNC: 137 MMOL/L (ref 136–145)
TRIGL SERPL-MCNC: 90 MG/DL (ref 0–150)
VLDLC SERPL-MCNC: 18 MG/DL (ref 5–40)

## 2018-02-13 PROCEDURE — 92610 EVALUATE SWALLOWING FUNCTION: CPT

## 2018-02-13 PROCEDURE — 25010000002 HEPARIN (PORCINE) PER 1000 UNITS: Performed by: INTERNAL MEDICINE

## 2018-02-13 PROCEDURE — 82306 VITAMIN D 25 HYDROXY: CPT | Performed by: INTERNAL MEDICINE

## 2018-02-13 PROCEDURE — 63710000001 PREDNISONE PER 5 MG: Performed by: INTERNAL MEDICINE

## 2018-02-13 PROCEDURE — 97110 THERAPEUTIC EXERCISES: CPT

## 2018-02-13 PROCEDURE — 99232 SBSQ HOSP IP/OBS MODERATE 35: CPT | Performed by: INTERNAL MEDICINE

## 2018-02-13 PROCEDURE — 25010000002 HYDROCORTISONE SODIUM SUCCINATE 100 MG RECONSTITUTED SOLUTION: Performed by: INTERNAL MEDICINE

## 2018-02-13 PROCEDURE — 87081 CULTURE SCREEN ONLY: CPT | Performed by: INTERNAL MEDICINE

## 2018-02-13 PROCEDURE — 80061 LIPID PANEL: CPT | Performed by: INTERNAL MEDICINE

## 2018-02-13 PROCEDURE — 25010000002 PIPERACILLIN SOD-TAZOBACTAM PER 1 G: Performed by: INTERNAL MEDICINE

## 2018-02-13 PROCEDURE — 80053 COMPREHEN METABOLIC PANEL: CPT | Performed by: INTERNAL MEDICINE

## 2018-02-13 PROCEDURE — 25010000002 VANCOMYCIN 10 G RECONSTITUTED SOLUTION: Performed by: INTERNAL MEDICINE

## 2018-02-13 PROCEDURE — 97162 PT EVAL MOD COMPLEX 30 MIN: CPT

## 2018-02-13 RX ORDER — FUROSEMIDE 40 MG/1
40 TABLET ORAL DAILY
Status: DISCONTINUED | OUTPATIENT
Start: 2018-02-13 | End: 2018-02-15 | Stop reason: HOSPADM

## 2018-02-13 RX ADMIN — TAZOBACTAM SODIUM AND PIPERACILLIN SODIUM 3.38 G: 375; 3 INJECTION, SOLUTION INTRAVENOUS at 10:01

## 2018-02-13 RX ADMIN — TAZOBACTAM SODIUM AND PIPERACILLIN SODIUM 3.38 G: 375; 3 INJECTION, SOLUTION INTRAVENOUS at 04:02

## 2018-02-13 RX ADMIN — HYDROCORTISONE SODIUM SUCCINATE 50 MG: 100 INJECTION, POWDER, FOR SOLUTION INTRAMUSCULAR; INTRAVENOUS at 06:03

## 2018-02-13 RX ADMIN — HEPARIN SODIUM 5000 UNITS: 5000 INJECTION, SOLUTION INTRAVENOUS; SUBCUTANEOUS at 20:11

## 2018-02-13 RX ADMIN — HEPARIN SODIUM 5000 UNITS: 5000 INJECTION, SOLUTION INTRAVENOUS; SUBCUTANEOUS at 12:19

## 2018-02-13 RX ADMIN — FUROSEMIDE 40 MG: 40 TABLET ORAL at 13:57

## 2018-02-13 RX ADMIN — DIGOXIN 125 MCG: 0.12 TABLET ORAL at 12:19

## 2018-02-13 RX ADMIN — PREDNISONE 15 MG: 5 TABLET ORAL at 12:19

## 2018-02-13 RX ADMIN — VANCOMYCIN HYDROCHLORIDE 1500 MG: 100 INJECTION, POWDER, LYOPHILIZED, FOR SOLUTION INTRAVENOUS at 06:03

## 2018-02-13 RX ADMIN — PREDNISONE 7.5 MG: 5 TABLET ORAL at 16:56

## 2018-02-13 RX ADMIN — LEVOTHYROXINE SODIUM 137 MCG: 137 TABLET ORAL at 06:03

## 2018-02-13 NOTE — PROGRESS NOTES
Pedro Luis Arteaga MD                          [phone]      Patient ID:    Name:  Jesse Taylor    MRN:  [id number]    1935   82 y.o.  male            Patient Care Team:  Justin Puga MD as PCP - General (Endocrinology)  Bony Figueroa MD as Consulting Physician (Cardiology)  Judah Gonzales MD as Surgeon (Neurosurgery)  Chino Fajardo II, MD as Consulting Physician (Hematology and Oncology)    LOS: 2    CC/Reason for visit:     Subjective: Pt seen and examined this AM. No acute overnight events noted. Doing better. On RA mostly.    ROS:   Denies any fevers/ chills/ CP/ palpitations/ Nausea/ vomiting/ Diarrhoea  No Worsening cough or SOA.     Objective     Vital Signs past 24hrs    BP range: BP: ()/(60-78) 99/61  Pulse range: Heart Rate:  [] 75  Resp rate range: Resp:  [16-18] 18  Temp range: Temp (24hrs), Av.6 °F (35.3 °C), Min:95.1 °F (35.1 °C), Max:96.1 °F (35.6 °C)      Ventilator/Non-Invasive Ventilation Settings     None          O2 Device: nasal cannula       83 kg (182 lb 14.4 oz); Body mass index is 24.81 kg/(m^2).    No intake or output data in the 24 hours ending 18 1628    Exam:    GEN:  Elderly white male in no acute distress.  EYES:   EOM-i, anicteric sclera bilat  ENT:    External ears/nose normal, OP clear  NECK:  Supple, midline trachea, No JVD or cervical LApathy  LUNGS: Bilateral breath sounds heard, decreased pulses of the   bases occasional crackles  CV:  Regular rate and rhythm, No murmur  ABD:  Non tender, no distended, no palpable liver or masses  EXT:  No cyanosis or clubbing, 1+ peripheral/sacral edema    Scheduled meds:      digoxin 125 mcg Oral Daily   furosemide 40 mg Oral Daily   heparin (porcine) 5,000 Units Subcutaneous Q12H   [START ON 2018] levoFLOXacin 750 mg Intravenous Q48H   levothyroxine 137 mcg Oral Q AM   piperacillin-tazobactam 3.375 g Intravenous Q8H   predniSONE 15 mg Oral Daily With Breakfast    predniSONE 7.5 mg Oral Daily With Dinner   vancomycin 1,500 mg Intravenous Q24H       IV meds:                          Pharmacy to dose vancomycin        Data Review:        Results from last 7 days  Lab Units 02/13/18  0521 02/12/18  1213 02/12/18  0603 02/11/18 2228 02/11/18 2156   SODIUM mmol/L 137 139 140  --  142   POTASSIUM mmol/L 3.9 4.6 4.7  --  4.7   CHLORIDE mmol/L 103 105 102  --  102   CO2 mmol/L 23.8 18.1* 25.1  --  23.8   BUN mg/dL 33* 28* 26*  --  25*   CREATININE mg/dL 1.35* 1.33* 1.51*  --  1.53*   CALCIUM mg/dL 7.8* 7.5* 7.9*  --  8.7   BILIRUBIN mg/dL 1.0 0.9  --   --  1.4*   ALK PHOS U/L 116 124*  --   --  176*   ALT (SGPT) U/L 27 28  --   --  38   AST (SGOT) U/L 29 32  --   --  55*   GLUCOSE mg/dL 131* 109* 103*  --  94   WBC 10*3/mm3  --  8.30  --  9.29  --    HEMOGLOBIN g/dL  --  14.4  --  15.6  --    PLATELETS 10*3/mm3  --  86*  --  117*  --    INR   --   --   --   --  1.26*   PROBNP pg/mL  --   --   --   --  31720.0*   PROCALCITONIN ng/mL  --   --   --   --  0.96*       Lab Results   Component Value Date    CALCIUM 7.8 (L) 02/13/2018         Results from last 7 days  Lab Units 02/11/18 2156   BLOODCX  No growth at 24 hours  No growth at 24 hours         Results from last 7 days  Lab Units 02/12/18  0603 02/11/18 2156   TROPONIN T ng/mL 0.030 0.039*       Results Review:    I have reviewed the available laboratory results and reviewed the chest imaging personally    Imaging Results (all)     Procedure Component Value Units Date/Time    XR Chest 1 View [[id number]] Collected:  02/11/18 2212     Updated:  02/11/18 2212    Narrative:       X-RAY CHEST 1 VIEW.     HISTORY: Fever, shortness of breath.     COMPARISON: 07/10/2017.     FINDINGS:  Mild cardiomegaly and low lung volumes. Calcified lymph nodes in the  mediastinum and right hilum. Calcified granulomas bilaterally.         There is no consolidation or effusion.              Impression:       No acute findings.                    ASSESSMENT:   1. Septic Shock - resolved  2. HMPV infection with probable superimposed bacterial pneumonia  3. Acute hypoxic respiratory failure  4. Mixed respiratory alkalosis and metabolic acidosis  5. Lactic acidosis  6. Elevated transaminase, mild, secondary to sepsis  7. JAELYN on CKD  8. Troponin elevation, mild, likely secondary to sepsis and CKD  9. Severe AS/MR/ Moderate AR - not a surgical candidate  10. Chronic Systolic CHF (EF - 35%)  11. H/o Afib not on AC.    PLAN:  Patient responded well to the antibiotic and IV fluid boluses.  We will finish course of levaquin.    Wean supplemental oxygen as tolerated.  Physical therapy/incentive spirometry and Flutter valve  DNR/DNI     No further recs, Will see prn.     I have discussed my findings and recommendations with patient and nursing staff.     Pedro Luis Arteaga MD  2/13/2018

## 2018-02-13 NOTE — PLAN OF CARE
Problem: Fall Risk (Adult)  Goal: Identify Related Risk Factors and Signs and Symptoms  Outcome: Ongoing (interventions implemented as appropriate)    Goal: Absence of Falls  Outcome: Ongoing (interventions implemented as appropriate)      Problem: Patient Care Overview (Adult)  Goal: Plan of Care Review  Outcome: Ongoing (interventions implemented as appropriate)    Goal: Adult Individualization and Mutuality  Outcome: Ongoing (interventions implemented as appropriate)    Goal: Discharge Needs Assessment  Outcome: Ongoing (interventions implemented as appropriate)      Problem: Sepsis (Adult)  Goal: Signs and Symptoms of Listed Potential Problems Will be Absent or Manageable (Sepsis)  Outcome: Ongoing (interventions implemented as appropriate)

## 2018-02-13 NOTE — MBS/VFSS/FEES
"Acute Care - Speech Language Pathology   Swallow Initial Evaluation [facility]     Patient Name: Jesse Taylor  : 1935  MRN: [id number]  Today's Date: 2018  Onset of Illness/Injury or Date of Surgery Date: 18            Admit Date: 2018  SPEECH-LANGUAGE PATHOLOGY EVALUATION - SWALLOW  Subjective: The patient was seen on this date for a Clinical Swallow evaluation.  Patient was alert and cooperative.    The patient's history is significant for sepsis, PNA. Pt with \"hole\" in tooth, prefers soft consistencies.  Objective: Textures given included thin liquid, puree consistency and mechanical soft consistency.  Assessment: Difficulties were noted with none of the above consistencies.  SLP Findings:  Patient presents with  oral dysphagia, without esophageal component.   Recommendations: Diet Textures: thin liquid, mechanical soft consistency food.  Medications should be taken whole or crushed with thin or puree. May have water and ice between meals after oral care, under staff or family supervision and with the recommended strategies for safe swallowing.   Recommended Strategies: Upright for PO and small bites and sips. Oral care before breakfast, after all meals and PRN.  Other Recommended Evaluations:     Dysphagia therapy is recommended. Rationale: Tolerate least restrictive diet.    Visit Dx:     ICD-10-CM ICD-9-CM   1. Sepsis, due to unspecified organism A41.9 038.9     995.91   2. Pneumonia due to infectious organism, unspecified laterality, unspecified part of lung J18.9 136.9     484.8   3. JAELYN (acute kidney injury) N17.9 584.9   4. Atrial fibrillation with RVR I48.91 427.31   5. Other specified hypotension I95.89 458.8   6. 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   • Hyperlipidemia   • Type 2 diabetes mellitus without complication, without long-term current use of insulin   • Carotid artery disease   • Secondary hypothyroidism   • Secondary adrenal insufficiency   • Vitamin D deficiency   • Abnormal results of function studies of other organs and systems    • Nonrheumatic aortic valve insufficiency   • Coronary artery disease involving native coronary artery of native heart without angina pectoris   • Aortic stenosis   • Atrial fibrillation   • Sepsis   • Pneumonia (human metapneumovirus with secondary bacterial component)   • Abnormal LFTs   • Septic shock (due to PNA)   • JAELYN (acute kidney injury)   • Chronic systolic congestive heart failure   • Stage 3 chronic kidney disease   • Acute respiratory failure with hypoxia     Past Medical History:   Diagnosis Date   • Aortic stenosis     7/12 mod to severe with GISELE of 1 on echo; 11/14 severe AS 0.75; nl LVSF; severe AI; BNP nl   • Atherosclerotic heart disease of native coronary artery without angina pectoris    • Basal cell carcinoma of skin 09/24/2014    behind ear   • Benign prostatic hypertrophy    • Bony sclerosis    • Claustrophobia    • Disease of thyroid gland    • Edema    • Glaucoma    • Growth hormone deficiency    • Hematoma of arm     resolved 24Nov2015   • 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    • Hyperlipidemia    • Hypertension    • Hypogonadism, male    • Hypopituitarism    • IgA monoclonal gammopathy    • Lacunar stroke    • Low testosterone    • Monoclonal gammopathy of undetermined significance    • Obesity    • 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   • OTHER SURGICAL HISTORY  08/05/2013    Carotid thromboendarterectomy right,  8/13   • SKIN BIOPSY     • TONSILLECTOMY     • TRANSPHENOIDAL / TRANSNASAL HYPOPHYSECTOMY / RESECTION PITUITARY TUMOR      Benign tumor removed          SWALLOW EVALUATION (last 72 hours)      Swallow Evaluation       02/13/18 1527                Rehab Evaluation    Document Type evaluation  -OC        Subjective Information no complaints;agree to therapy  -OC        Patient Effort, Rehab Treatment good  -OC        Symptoms Noted During/After Treatment none  -OC        General Information    Patient Profile Review yes  -OC        Current Diet Limitations thin liquids;other (see comments)   full liquid  -OC        Prior Level of Function- Swallowing no diet consistency restrictions  -OC        Plans/Goals Discussed With patient;agreed upon  -OC        Barriers to Rehab none identified  -OC        Clinical Impression    Patient's Goals For Discharge patient did not state  -OC        SLP Swallowing Diagnosis oral dysfunction  -OC        Rehab Potential/Prognosis, Swallowing good, to achieve stated therapy goals  -OC        Criteria for Skilled Therapeutic Interventions Met skilled criteria for dysphagia intervention met  -OC        FCM, Swallowing 4-->Level 4  -OC        Therapy Frequency PRN  -OC        Predicted Duration Therapy Interv (days) until discharge  -OC        Expected Duration Therapy Session (min) 15-30 minutes  -OC        SLP Diet Recommendation soft textures;ground;thin liquids  -OC        Recommended Feeding/Eating Techniques alternate between small bites and sips of food/liquid;maintain upright posture during/after eating for 30 mins;small sips/bites  -OC        SLP Rec. for Method of Medication Administration meds whole with thin liquid;meds whole in  pudding/applesauce  -OC        Monitor For Signs Of Aspiration cough;elevated WBC count;gurgly voice;throat clearing  -OC        Anticipated Discharge Disposition other (see comments)   unknown  -OC        Pain Assessment    Pain Assessment No/denies pain  -OC        Cognitive Assessment/Intervention    Current Cognitive/Communication Assessment functional  -OC        Orientation Status oriented x 4  -OC        Oral Motor Structure and Function    Oral Motor Anatomy and Physiology patient demonstrates anatomy and physiology that is WNL  -OC        Dentition Assessment present and adequate;teeth are in poor condition;other (see comments)   hole in L back molar  -OC        Secretion Management WNL/WFL  -OC        Volitional Swallow no difficulties initiating volitional swallow  -OC        Volitional Cough no difficulties initiating volitional cough  -OC        Oral Musculature General Assessment WFL (within functional limits)  -OC          User Key  (r) = Recorded By, (t) = Taken By, (c) = Cosigned By    Initials Name Effective Dates    OC Analia Powell MA,CCC-SLP 04/05/17 -         EDUCATION  The patient has been educated in the following areas:   Dysphagia (Swallowing Impairment).    SLP Recommendation and Plan  SLP Swallowing Diagnosis: oral dysfunction  SLP Diet Recommendation: soft textures, ground, thin liquids  Recommended Feeding/Eating Techniques: alternate between small bites and sips of food/liquid, maintain upright posture during/after eating for 30 mins, small sips/bites  SLP Rec. for Method of Medication Administration: meds whole with thin liquid, meds whole in pudding/applesauce  Monitor For Signs Of Aspiration: cough, elevated WBC count, gurgly voice, throat clearing     Criteria for Skilled Therapeutic Interventions Met: skilled criteria for dysphagia intervention met  Anticipated Discharge Disposition: other (see comments) (unknown)  Rehab Potential/Prognosis, Swallowing: good, to achieve stated  therapy goals  Therapy Frequency: PRN             Plan of Care Review  Plan Of Care Reviewed With: patient  Outcome Summary/Follow up Plan: bedside swallow eval completed. Recommend mech soft with thins, meds whole with thin or  puree          IP SLP Goals       02/13/18 1543          Safely Consume Diet    Safely Consume Diet- SLP, Date Established 02/13/18  -OC      Safely Consume Diet- SLP, Time to Achieve by discharge  -OC      Safely Consume Diet- SLP, Outcome goal ongoing  -OC        User Key  (r) = Recorded By, (t) = Taken By, (c) = Cosigned By    Initials Name Provider Type    STEFAN Powell MA,CCC-SLP Speech and Language Pathologist             SLP Outcome Measures (last 72 hours)      SLP Outcome Measures       02/13/18 1530          SLP Outcome Measures    Outcome Measure Used? Adult NOMS  -OC      FCM Scores    FCM Chosen Swallowing  -OC      Swallowing FCM Score 4  -OC        User Key  (r) = Recorded By, (t) = Taken By, (c) = Cosigned By    Initials Name Effective Dates    OC Analia Powell MA,BABAR-SLP 04/05/17 -            Time Calculation:         Time Calculation- SLP       02/13/18 1546          Time Calculation- SLP    SLP Start Time 1430  -OC      SLP Stop Time 1530  -OC      SLP Time Calculation (min) 60 min  -OC      SLP Received On 02/13/18  -OC        User Key  (r) = Recorded By, (t) = Taken By, (c) = Cosigned By    Initials Name Provider Type    STEFAN Powell MA,CCC-SLP Speech and Language Pathologist          Therapy Charges for Today     Code Description Service Date Service Provider Modifiers Qty    83158461064 HC ST EVAL ORAL PHARYNG SWALLOW 4 2/13/2018 Analia Powell MA,BABAR-SLP GN 1               Analia Powell MA,BABAR-SLP  2/13/2018

## 2018-02-13 NOTE — PROGRESS NOTES
"Jesse Taylor  1935 82 y.o.  [id number]      Patient Care Team:  Justin Puga MD as PCP - General (Endocrinology)  Bony Figueroa MD as Consulting Physician (Cardiology)  Judah Gonzales MD as Surgeon (Neurosurgery)  Chino Fajardo II, MD as Consulting Physician (Hematology and Oncology)    CC: Severe aortic stenosis severe aortic insufficiency and congestive heart failure, failure to thrive    Interval History: He seems to be a little bit better      Objective   Vital Signs  Temp:  [95.6 °F (35.3 °C)-96.1 °F (35.6 °C)] 96.1 °F (35.6 °C)  Heart Rate:  [] 89  Resp:  [16] 16  BP: ()/(60-78) 114/78  No intake or output data in the 24 hours ending 02/13/18 1204  Flowsheet Rows         First Filed Value    Admission Height  182.9 cm (72\") Documented at 02/11/2018 2137    Admission Weight  86.2 kg (190 lb) Documented at 02/11/2018 2137          Physical Exam:   General Appearance:    Alert,oriented, in no acute distress   Lungs:     Clear to auscultation,BS are equal    Heart:    Normal S1 and S2, RRR with 2/6 systolic ejection murmur, gallop or rub   HEENT:    Sclera are clear, no JVD or adenopathy   Abdomen:     Normal bowel sounds, soft non-tender, non-distended, no HSM   Extremities:   Moves all extremities well, no edema, no cyanosis, no             Redness, no rash     Medication Review:        digoxin 125 mcg Oral Daily   heparin (porcine) 5,000 Units Subcutaneous Q12H   [START ON 2/14/2018] levoFLOXacin 750 mg Intravenous Q48H   levothyroxine 137 mcg Oral Q AM   piperacillin-tazobactam 3.375 g Intravenous Q8H   predniSONE 15 mg Oral Daily With Breakfast   predniSONE 7.5 mg Oral Daily With Dinner   vancomycin 1,500 mg Intravenous Q24H       Pharmacy to dose vancomycin          I reviewed the patient's new clinical results.  I personally viewed and interpreted the patient's EKG/Telemetry data    Assessment/Plan  Active Hospital Problems (** Indicates Principal Problem)    Diagnosis Date " Noted   • **Septic shock (due to PNA) [A41.9, R65.21] 02/11/2018   • Chronic systolic congestive heart failure [I50.22] 02/12/2018   • Stage 3 chronic kidney disease [N18.3] 02/12/2018   • Acute respiratory failure with hypoxia [J96.01] 02/12/2018   • Pneumonia (human metapneumovirus with secondary bacterial component) [J18.9] 02/11/2018   • Abnormal LFTs [R79.89] 02/11/2018   • Atrial fibrillation [I48.91] 01/15/2018   • Aortic stenosis [I35.0] 01/11/2018   • Coronary artery disease involving native coronary artery of native heart without angina pectoris [I25.10] 11/22/2017   • Vitamin D deficiency [E55.9] 09/12/2017   • Secondary adrenal insufficiency [E27.49] 06/10/2016   • 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       It seems to be stable without any get back on his by mouth diuretic his overall prognosis is quite poor from a cardiac standpoint he's been having weight loss and more frequent hospital admissions    Bony Figueroa MD  02/13/18  12:04 PM

## 2018-02-13 NOTE — PLAN OF CARE
Problem: Patient Care Overview (Adult)  Goal: Plan of Care Review  Outcome: Ongoing (interventions implemented as appropriate)   02/13/18 1543   Coping/Psychosocial Response Interventions   Plan Of Care Reviewed With patient   Outcome Evaluation   Outcome Summary/Follow up Plan bedside swallow eval completed. Recommend mech soft with thins, meds whole with thin or puree       Problem: Inpatient SLP  Goal: Dysphagia- Patient will safely consume diet as per recommendation with no signs/symptoms of aspiration  Outcome: Ongoing (interventions implemented as appropriate)   02/13/18 1543   Safely Consume Diet   Safely Consume Diet- SLP, Date Established 02/13/18   Safely Consume Diet- SLP, Time to Achieve by discharge   Safely Consume Diet- SLP, Outcome goal ongoing

## 2018-02-13 NOTE — PLAN OF CARE
Problem: Patient Care Overview (Adult)  Goal: Plan of Care Review  Outcome: Ongoing (interventions implemented as appropriate)    Goal: Adult Individualization and Mutuality  Outcome: Ongoing (interventions implemented as appropriate)    Goal: Discharge Needs Assessment  Outcome: Ongoing (interventions implemented as appropriate)      Problem: Sepsis (Adult)  Goal: Signs and Symptoms of Listed Potential Problems Will be Absent or Manageable (Sepsis)  Outcome: Ongoing (interventions implemented as appropriate)      Problem: Pain, Acute (Adult)  Goal: Identify Related Risk Factors and Signs and Symptoms  Outcome: Ongoing (interventions implemented as appropriate)    Goal: Acceptable Pain Control/Comfort Level  Outcome: Ongoing (interventions implemented as appropriate)

## 2018-02-13 NOTE — PROGRESS NOTES
"   LOS: 2 days   Patient Care Team:  Justin Puga MD as PCP - General (Endocrinology)  Bony Figueroa MD as Consulting Physician (Cardiology)  Judah Gonzales MD as Surgeon (Neurosurgery)  Chino Fajardo II, MD as Consulting Physician (Hematology and Oncology)    Chief Complaint: none today    Subjective     HPI Comments: Seems clearer today. Still with O2 requirement. C/o cough. DENNIS.    Fever    Associated symptoms include coughing. Pertinent negatives include no chest pain, diarrhea, nausea or vomiting.   Shortness of Breath   Pertinent negatives include no chest pain, fever or vomiting.       Subjective:  Symptoms:  Improved.  He reports malaise, cough and weakness.  No shortness of breath, chest pain, headache, chest pressure, anorexia, diarrhea or anxiety.    Diet:  Poor intake.  No nausea or vomiting.    Activity level: Impaired due to weakness.    Pain:  He reports no pain.        History taken from: patient chart    Objective     Vital Signs  Temp:  [95.1 °F (35.1 °C)-96.1 °F (35.6 °C)] 95.1 °F (35.1 °C)  Heart Rate:  [] 75  Resp:  [16-18] 18  BP: ()/(60-78) 99/61    Objective:  General Appearance:  Comfortable and in no acute distress.    Vital signs: (most recent): Blood pressure 99/61, pulse 75, temperature 95.1 °F (35.1 °C), temperature source Oral, resp. rate 18, height 182.9 cm (72\"), weight 83 kg (182 lb 14.4 oz), SpO2 98 %.  Vital signs are normal.  No fever.    Output: Producing urine and no stool output.    HEENT: Normal HEENT exam.    Lungs:  Normal respiratory rate and normal effort.  There are wheezes.  (Anteriorly)  Heart: Normal rate.  Irregular rhythm.  Positive for murmur.    Abdomen: Abdomen is soft.  Bowel sounds are normal.   There is no abdominal tenderness.     Extremities: There is no dependent edema.    Pulses: Distal pulses are intact.    Neurological: Patient is alert and oriented to person, place and time.    Pupils:  Pupils are equal, round, and reactive to " light.    Skin:  Warm and dry.  (Venous stasis changes BLE)            Results Review:     I reviewed the patient's new clinical results.  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    Hyperlipidemia    Type 2 diabetes mellitus without complication, without long-term current use of insulin    Secondary hypothyroidism    Secondary adrenal insufficiency    Vitamin D deficiency    Coronary artery disease involving native coronary artery of native heart without angina pectoris    Aortic stenosis    Atrial fibrillation    Pneumonia (human metapneumovirus with secondary bacterial component)    Abnormal LFTs    Chronic systolic congestive heart failure    Stage 3 chronic kidney disease    Acute respiratory failure with hypoxia          Plan:   (Continue LQN/Zosyn/Vancomycin  S/p IVFs per Card and Pulm  Back on home oral Lasix  Watch for volume overload given pt's cardiac history  Appreciate Dr. Puga's attention to pt, stress dosing steroid  Long d/w pt regarding DNR status, he confirms conditional code with me as he did with Dr. Figueroa).       Chava Roldan MD  02/13/18  2:57 PM    Time: 25min

## 2018-02-13 NOTE — CONSULTS
ENDOCRINOLOGY CONSULTATION    REQUESTING PHYSICIAN: Chava Roldan MD    CONSULTING PHYSICIAN: Justin Puga MD    REASON FOR CONSULTATION: Endocrine evaluation.     HISTORY OF PRESENT ILLNESS: The patient is an 82-year-old male who was admitted to the hospital on 02/11/2018 because of cough productive of whitish sputum with associated fever and shortness of breath. He was brought to the emergency room where he was found to be hypoxic and tachycardic. His blood pressure dropped and he was given IV fluid bolus and started empirically on antibiotics. Cultures are pending. The respiratory screen was positive for metapneumovirus. He is presently on Zithromax, Zosyn, vancomycin, and Tamiflu.     The patient has a pituitary tumor and had 2 previous transphenoidal resections and radiation therapy. He has residual tumor and has been followed with Judah Gonzales MD. He has been on replacement AndroGel 1.62%, 4 pumps per day, levothyroxine 137 mcg per day, and prednisone 5 mg in the morning and 2.5 mg in the evening. He was on growth hormone in the past which was discontinued because of arthralgia.     He has type 2 diabetes mellitus and has been on diet alone. Hemoglobin A1c done in 09/2017 was 5.98%.     He has hyperlipidemia and has been on Zocor 20 mg once a day. He denies any myalgia.     PAST MEDICAL/SURGICAL HISTORY:  1. History of vitamin D deficiency and has been on vitamin D 1000 units per day.   2. IgA monoclonal gammopathy of unknown significance and was seen by Chino Fajardo MD.   3. Carotid artery disease and had previous carotid endarterectomy done by Alpa Parr MD.   4. Known coronary artery disease and aortic valve stenosis and aortic valve regurgitation and has been following with Bony Figueroa MD.   5. He developed atrial fibrillation in 01/01/2018 but because of advanced age and comorbidities he was not anticoagulated.   6. History of stroke.   7. 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   8. BPH.    9. Basal skin cancer removed behind his ear.   10. Previous colonoscopy.   11. Tonsillectomy.     ALLERGIES: No known drug allergies.     FAMILY HISTORY: Two sisters have brain cancer. His mother had heart disease.     SOCIAL HISTORY: The patient is retired from [employer]. He was  recently. He smoked 1 pack of cigarettes per day and quit smoking many years ago. He denies alcohol or drug abuse. He lives with his children.     REVIEW OF SYSTEMS: He had fever but no chills. He denies nausea, vomiting, or diarrhea. He denies any chest pain or palpitations. He denies wheezing. He denies abdominal pain. He denies melena, hematochezia, hematemesis, hematuria, dysuria, or dribbling. He denies seizures or loss of consciousness.     PHYSICAL EXAMINATION:  VITAL SIGNS: Blood pressure 100/60, respiratory rate 16, heart rate 84, temperature 96 °F.   GENERAL: The patient is awake, alert, oriented; not dyspneic, not tachypneic, not orthopneic, and not in acute distress.   HEENT: Pink conjunctivae. Sclerae anicteric. No xanthelasma. Full extraocular muscle movement. No facial asymmetry. Speech is fluent. No pharyngeal congestion or oral thrush.   NECK: No carotid bruits. Neck veins are not visible at 45 degrees. Thyroid is not enlarged.   LUNGS: Equal chest expansion. No rales. No wheezes.   HEART: Irregularly irregular heart rate and rhythm. There is a systolic murmur at the base. No gallop. There is a diastolic murmur at the base.   ABDOMEN: Soft, nontender. No hepatojugular reflux. Bowel sounds are active.   EXTREMITIES: Warm. No cyanosis, pedal edema, or clubbing. No calf tenderness. Multiple leg varicosities. No xanthomas. Light touch sensation is grossly intact.     IMPRESSION:   1. Metapneumovirus infection; rule out bacterial pneumonia.   2. Sepsis.   3. Hypopituitarism status post 2 previous transphenoidal resections and radiation therapy for a benign pituitary tumor.   4. Hyperlipidemia.   5. Type 2  diabetes mellitus.   6. Coronary artery disease.   7. Aortic valve disorder.     RECOMMENDATIONS: Restart Solu-Cortef 50 mg IV every 8 hours. Check free T4 levels and restart levothyroxine 137 mcg per day. May hold AndroGel for now. Check lipid profile and adjust Zocor dose if needed. Check hemoglobin A1c in the morning. Check vitamin D and adjust vitamin D dose if needed.     I will defer antibiotic treatment to Dr. Roldan and I will defer decisions on the patient's coronary artery disease, valvular heart disease, and atrial fibrillation to Dr. Figueroa.     Thank you for the referral. I will follow the patient with you during his hospital stay.

## 2018-02-13 NOTE — THERAPY EVALUATION
Acute Care - Physical Therapy Initial Evaluation  [facility]     Patient Name: Jesse Taylor  : 1935  MRN: [id number]  Today's Date: 2018   Onset of Illness/Injury or Date of Surgery Date: 18     Referring Physician: Chandler      Admit Date: 2018     Visit Dx:    ICD-10-CM ICD-9-CM   1. Sepsis, due to unspecified organism A41.9 038.9     995.91   2. Pneumonia due to infectious organism, unspecified laterality, unspecified part of lung J18.9 136.9     484.8   3. JAELYN (acute kidney injury) N17.9 584.9   4. Atrial fibrillation with RVR I48.91 427.31   5. Other specified hypotension I95.89 458.8   6. 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   • Hyperlipidemia   • Type 2 diabetes mellitus without complication, without long-term current use of insulin   • Carotid artery disease   • Secondary hypothyroidism   • Secondary adrenal insufficiency   • Vitamin D deficiency   • Abnormal results of function studies of other organs and systems    • Nonrheumatic aortic valve insufficiency   • Coronary artery disease involving native coronary artery of native heart without angina pectoris   • Aortic stenosis   • Atrial fibrillation   • Sepsis   • Pneumonia (human metapneumovirus with secondary bacterial component)   • Abnormal LFTs   • Septic shock (due to PNA)   • JAELYN (acute kidney injury)   • Chronic systolic congestive heart failure   • Stage 3 chronic kidney disease   • Acute respiratory failure with hypoxia     Past Medical History:   Diagnosis Date   • Aortic stenosis      mod to severe with GISELE of 1 on echo;  severe AS 0.75; nl LVSF; severe AI; BNP nl   • Atherosclerotic heart disease of native  coronary artery without angina pectoris    • Basal cell carcinoma of skin 09/24/2014    behind ear   • Benign prostatic hypertrophy    • Bony sclerosis    • Claustrophobia    • Disease of thyroid gland    • Edema    • Glaucoma    • Growth hormone deficiency    • Hematoma of arm     resolved 24Nov2015   • 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    • Hyperlipidemia    • Hypertension    • Hypogonadism, male    • Hypopituitarism    • IgA monoclonal gammopathy    • Lacunar stroke    • Low testosterone    • Monoclonal gammopathy of undetermined significance    • Obesity    • 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   • OTHER SURGICAL HISTORY  08/05/2013    Carotid thromboendarterectomy right,  8/13   • SKIN BIOPSY     • TONSILLECTOMY     • TRANSPHENOIDAL / TRANSNASAL HYPOPHYSECTOMY / RESECTION PITUITARY TUMOR      Benign tumor removed          PT ASSESSMENT (last 72 hours)      PT Evaluation       02/13/18 0900 02/13/18 0438    Rehab Evaluation    Document Type evaluation  -EE     Subjective Information agree to therapy;complains of;fatigue  -EE     Patient Effort, Rehab Treatment good  -EE     Symptoms Noted During/After Treatment none  -EE     General Information    Onset of Illness/Injury or Date of Surgery Date 02/11/18  -EE     Referring Physician Nidadavolu  -EE     General Observations Pt supine in bed in no acute distress  -EE     Pertinent History Of Current Problem Septic shock, metapneumovirus, history of afib, CAD, CHF, CKD  -EE     Precautions/Limitations fall  precautions;oxygen therapy device and L/min   2 L O2  -EE     Prior Level of Function independent:;all household mobility;community mobility  -EE     Equipment Currently Used at Home cane, straight;walker, rolling   uses cane primarily  -EE     Plans/Goals Discussed With patient;agreed upon  -EE     Barriers to Rehab none identified  -EE     Living Environment    Lives With child(dasia), adult  -EE     Living Arrangements house  -EE     Home Accessibility stairs to enter home  -EE     Number of Stairs to Enter Home 2  -EE     Stair Railings at Home outside, present at both sides  -EE     Transportation Available  car;family or friend will provide  -HL    Clinical Impression    Patient/Family Goals Statement Go home  -EE     Criteria for Skilled Therapeutic Interventions Met yes;treatment indicated  -EE     Pathology/Pathophysiology Noted (Describe Specifically for Each System) musculoskeletal;pulmonary  -EE     Impairments Found (describe specific impairments) gait, locomotion, and balance;aerobic capacity/endurance  -EE     Rehab Potential good, to achieve stated therapy goals  -EE     Vital Signs    Pre SpO2 (%) --   pulse ox not in use prior to tx  -EE     O2 Delivery Pre Treatment supplemental O2  -EE     O2 Delivery Intra Treatment supplemental O2  -EE     Post SpO2 (%) 99  -EE     O2 Delivery Post Treatment supplemental O2  -EE     Pre Patient Position Supine  -EE     Intra Patient Position Standing  -EE     Post Patient Position Supine  -EE     Pain Assessment    Pain Assessment No/denies pain  -EE     Cognitive Assessment/Intervention    Current Cognitive/Communication Assessment functional  -EE     Orientation Status oriented x 4  -EE     Follows Commands/Answers Questions 100% of the time  -EE     Personal Safety WNL/WFL  -EE     Personal Safety Interventions fall prevention program maintained;gait belt;muscle strengthening facilitated;nonskid shoes/slippers when out of bed;supervised activity  -EE     ROM  (Range of Motion)    General ROM no range of motion deficits identified  -EE     MMT (Manual Muscle Testing)    General MMT Assessment lower extremity strength deficits identified  -EE     General MMT Assessment Detail B LE weakness observed during functional mobility  -EE     Bed Mobility, Assessment/Treatment    Bed Mobility, Assistive Device head of bed elevated  -EE     Bed Mob, Supine to Sit, [place] contact guard assist  -EE     Bed Mob, Sit to Supine, [place] contact guard assist  -EE     Transfer Assessment/Treatment    Transfers, Sit-Stand [place] contact guard assist;verbal cues required  -EE     Transfers, Stand-Sit [place] contact guard assist;verbal cues required  -EE     Transfers, Sit-Stand-Sit, Assist Device rolling walker  -EE     Transfer, Impairments strength decreased;impaired balance  -EE     Transfer, Comment verbal cues required for hand placement  -EE     Gait Assessment/Treatment    Gait, [place] Level contact guard assist;verbal cues required  -EE     Gait, Assistive Device rolling walker  -EE     Gait, Distance (Feet) 200  -EE     Gait, Gait Deviations forward flexed posture;demetra decreased  -EE     Gait, Impairments strength decreased;impaired balance  -EE     Motor Skills/Interventions    Additional Documentation Balance Skills Training (Group)  -EE     Balance Skills Training    Sitting-Level of Assistance Distant supervision  -EE     Sitting-Balance Support Feet supported  -EE     Standing-Level of Assistance Close supervision  -EE     Static Standing Balance Support assistive device  -EE     Gait Balance-Level of Assistance Contact guard  -EE     Gait Balance Support assistive device  -EE     Therapy Exercises    Bilateral Lower Extremities AROM:;5 reps;ankle pumps/circles;LAQ  -EE     Positioning and Restraints    Pre-Treatment Position in bed  -EE     Post Treatment Position bed  -EE     In Bed fowlers;call light within reach;encouraged to call for  assist;exit alarm on  -EE       02/12/18 2009 02/12/18 1631    General Information    Equipment Currently Used at Home cane, straight;walker, rolling  -DG     Living Environment    Lives With  child(dasia), adult  -DG    Living Arrangements  house  -DG    Home Accessibility  bed and bath on same level  -DG    Stair Railings at Home  none  -DG    Type of Financial/Environmental Concern  none  -DG    Transportation Available family or friend will provide  -DG family or friend will provide  -DG    Living Environment Comment  Patient lives on 1st floor of house- son lives in basement  -DG      02/12/18 1606 02/12/18 1135    Rehab Evaluation    Evaluation Not Performed other (see comments)   Pt unable to maintain alertness for swallow evaluation at this time.SLP to f/u 2/13 as pt appropriate.   -OC     General Information    Equipment Currently Used at Home  cane, quad  -DG      02/12/18 0300       General Information    Equipment Currently Used at Home cane, straight  -BJ       User Key  (r) = Recorded By, (t) = Taken By, (c) = Cosigned By    Initials Name Provider Type    OC Analia Powell MA,CCC-SLP Speech and Language Pathologist     Brenna Judd, PT Physical Therapist     Cristina Lozada, RN Registered Nurse    BJ Vera Guillory, RN Registered Nurse     Jossy Blanco, RN Registered Nurse          Physical Therapy Education     Title: PT OT SLP Therapies (Active)     Topic: Physical Therapy (Active)     Point: Mobility training (Done)    Learning Progress Summary    Learner Readiness Method Response Comment Documented by Status   Patient Acceptance E VU,NR  EE 02/13/18 0923 Done               Point: Home exercise program (Done)    Learning Progress Summary    Learner Readiness Method Response Comment Documented by Status   Patient Acceptance E VU,NR  EE 02/13/18 0923 Done                      User Key     Initials Effective Dates Name Provider Type Discipline     12/01/15 -  Brenna Judd, PT Physical Therapist PT                 PT Recommendation and Plan  Anticipated Discharge Disposition: home with home health, home with assist  Planned Therapy Interventions: balance training, bed mobility training, gait training, home exercise program, patient/family education, strengthening, transfer training  PT Frequency: daily  Plan of Care Review  Plan Of Care Reviewed With: patient  Outcome Summary/Follow up Plan: Pt presents from home w/septic shock, metapneuomovirus, history of CHF, CAD, afib, and CKD. Upon exam, pt demonstrates generalized weakness, decreased endurance, and impaired balance limiting mobility. Pt is independent with cane at baseline, currently requiring assist of one and use of rwx for safety. Pt demonstrates no LOB while ambulating with walker; will plan to attempt cane if able. Pt would benefit from continued PT to address above impairments and assist w/return to PLOF. Anticipate pt will be able to DC home with assist; may also benefit from  PT pending progress.           IP PT Goals       02/13/18 0923          Bed Mobility PT LTG    Bed Mobility PT LTG, Date Established 02/13/18  -EE      Bed Mobility PT LTG, Time to Achieve 1 wk  -EE      Bed Mobility PT LTG, Activity Type all bed mobility  -EE      Bed Mobility PT LTG, [place] Level conditional independence  -EE      Transfer Training PT LTG    Transfer Training PT LTG, Date Established 02/13/18  -EE      Transfer Training PT LTG, Time to Achieve 1 wk  -EE      Transfer Training PT LTG, Activity Type all transfers  -EE      Transfer Training PT LTG, [place] Level supervision required  -EE      Transfer Training PT LTG, Assist Device cane, straight  -EE      Gait Training PT LTG    Gait Training Goal PT LTG, Date Established 02/13/18  -EE      Gait Training Goal PT LTG, Time to Achieve 1 wk  -EE      Gait Training Goal PT LTG, [place] Level supervision required  -EE      Gait Training Goal PT LTG, Assist Device cane, straight  -EE      Gait  Training Goal PT LTG, Distance to Achieve 150  -EE        User Key  (r) = Recorded By, (t) = Taken By, (c) = Cosigned By    Initials Name Provider Type    EE Brenna Judd PT Physical Therapist                Outcome Measures       02/13/18 0900          How much help from another person do you currently need...    Turning from your back to your side while in flat bed without using bedrails? 4  -EE      Moving from lying on back to sitting on the side of a flat bed without bedrails? 3  -EE      Moving to and from a bed to a chair (including a wheelchair)? 3  -EE      Standing up from a chair using your arms (e.g., wheelchair, bedside chair)? 3  -EE      Climbing 3-5 steps with a railing? 3  -EE      To walk in hospital room? 3  -EE      AM-PAC 6 Clicks Score 19  -EE      Functional Assessment    Outcome Measure Options AM-PAC 6 Clicks Basic Mobility (PT)  -EE        User Key  (r) = Recorded By, (t) = Taken By, (c) = Cosigned By    Initials Name Provider Type    EE Brenna Judd PT Physical Therapist           Time Calculation:         PT Charges       02/13/18 0926          Time Calculation    Start Time 0900  -EE      Stop Time 0917  -EE      Time Calculation (min) 17 min  -EE      PT Received On 02/13/18  -EE      PT - Next Appointment 02/14/18  -EE      PT Goal Re-Cert Due Date 02/20/18  -EE        User Key  (r) = Recorded By, (t) = Taken By, (c) = Cosigned By    Initials Name Provider Type    EE Brenna Judd PT Physical Therapist          Therapy Charges for Today     Code Description Service Date Service Provider Modifiers Qty    37911195842 HC PT EVAL MOD COMPLEXITY 2 2/13/2018 Brenna Judd, PT GP 1    40169276877 HC PT THER PROC EA 15 MIN 2/13/2018 Brenna Judd, PT GP 1    16623050966 HC PT THER SUPP EA 15 MIN 2/13/2018 Brenna Judd, PT GP 1          PT G-Codes  Outcome Measure Options: AM-PAC 6 Clicks Basic Mobility (PT)      Brenna Judd PT  2/13/2018

## 2018-02-13 NOTE — PLAN OF CARE
Problem: Patient Care Overview (Adult)  Goal: Plan of Care Review   02/13/18 0923   Coping/Psychosocial Response Interventions   Plan Of Care Reviewed With patient   Outcome Evaluation   Outcome Summary/Follow up Plan Pt presents from home w/septic shock, metapneuomovirus, history of CHF, CAD, afib, and CKD. Upon exam, pt demonstrates generalized weakness, decreased endurance, and impaired balance limiting mobility. Pt is independent with cane at baseline, currently requiring assist of one and use of rwx for safety. Pt demonstrates no LOB while ambulating with walker; will plan to attempt cane if able. Pt would benefit from continued PT to address above impairments and assist w/return to PLOF. Anticipate pt will be able to DC home with assist; may also benefit from HH PT pending progress.        Problem: Inpatient Physical Therapy  Goal: Bed Mobility Goal LTG- PT   02/13/18 0923   Bed Mobility PT LTG   Bed Mobility PT LTG, Date Established 02/13/18   Bed Mobility PT LTG, Time to Achieve 1 wk   Bed Mobility PT LTG, Activity Type all bed mobility   Bed Mobility PT LTG, [place] Level conditional independence     Goal: Transfer Training Goal 1 LTG- PT   02/13/18 0923   Transfer Training PT LTG   Transfer Training PT LTG, Date Established 02/13/18   Transfer Training PT LTG, Time to Achieve 1 wk   Transfer Training PT LTG, Activity Type all transfers   Transfer Training PT LTG, [place] Level supervision required   Transfer Training PT LTG, Assist Device cane, straight     Goal: Gait Training Goal LTG- PT   02/13/18 0923   Gait Training PT LTG   Gait Training Goal PT LTG, Date Established 02/13/18   Gait Training Goal PT LTG, Time to Achieve 1 wk   Gait Training Goal PT LTG, [place] Level supervision required   Gait Training Goal PT LTG, Assist Device cane, straight   Gait Training Goal PT LTG, Distance to Achieve 150

## 2018-02-13 NOTE — NURSING NOTE
Patient was admitted from the ED.  When patient was brought to the floor, orders were released. Orders were duplicated and had to be corrected by pharmacy.  MD was notified and made aware of this situation.

## 2018-02-13 NOTE — PLAN OF CARE
Problem: Fall Risk (Adult)  Goal: Identify Related Risk Factors and Signs and Symptoms  Outcome: Ongoing (interventions implemented as appropriate)   02/12/18 0245   Fall Risk   Fall Risk: Related Risk Factors age-related changes   Fall Risk: Signs and Symptoms presence of risk factors     Goal: Absence of Falls  Outcome: Ongoing (interventions implemented as appropriate)   02/13/18 0438   Fall Risk (Adult)   Absence of Falls making progress toward outcome       Problem: Patient Care Overview (Adult)  Goal: Plan of Care Review  Outcome: Ongoing (interventions implemented as appropriate)   02/13/18 0438   Coping/Psychosocial Response Interventions   Plan Of Care Reviewed With patient   Patient Care Overview   Progress improving   Outcome Evaluation   Outcome Summary/Follow up Plan VSS. no c/o pain. abx continued. meds given per order. no falls. will continue to monitor.      Goal: Discharge Needs Assessment  Outcome: Ongoing (interventions implemented as appropriate)   02/12/18 2009 02/13/18 0438   Discharge Needs Assessment   Concerns To Be Addressed no discharge needs identified --    Self-Care   Equipment Currently Used at Home cane, straight;walker, rolling --    Living Environment   Transportation Available --  car;family or friend will provide       Problem: Sepsis (Adult)  Goal: Signs and Symptoms of Listed Potential Problems Will be Absent or Manageable (Sepsis)  Outcome: Ongoing (interventions implemented as appropriate)   02/13/18 0438   Sepsis   Problems Assessed (Sepsis) all       Problem: Pain, Acute (Adult)  Goal: Identify Related Risk Factors and Signs and Symptoms  Outcome: Ongoing (interventions implemented as appropriate)    Goal: Acceptable Pain Control/Comfort Level  Outcome: Ongoing (interventions implemented as appropriate)   02/13/18 0438   Pain, Acute (Adult)   Acceptable Pain Control/Comfort Level making progress toward outcome

## 2018-02-14 ENCOUNTER — APPOINTMENT (OUTPATIENT)
Dept: GENERAL RADIOLOGY | Facility: HOSPITAL | Age: 83
End: 2018-02-14
Attending: HOSPITALIST

## 2018-02-14 LAB
ANION GAP SERPL CALCULATED.3IONS-SCNC: 13 MMOL/L
BUN BLD-MCNC: 39 MG/DL (ref 8–23)
BUN/CREAT SERPL: 27.1 (ref 7–25)
CALCIUM SPEC-SCNC: 8.2 MG/DL (ref 8.6–10.5)
CHLORIDE SERPL-SCNC: 104 MMOL/L (ref 98–107)
CO2 SERPL-SCNC: 23 MMOL/L (ref 22–29)
CREAT BLD-MCNC: 1.44 MG/DL (ref 0.76–1.27)
DEPRECATED RDW RBC AUTO: 49.8 FL (ref 37–54)
ERYTHROCYTE [DISTWIDTH] IN BLOOD BY AUTOMATED COUNT: 13.5 % (ref 11.5–14.5)
GFR SERPL CREATININE-BSD FRML MDRD: 47 ML/MIN/1.73
GLUCOSE BLD-MCNC: 120 MG/DL (ref 65–99)
HCT VFR BLD AUTO: 40.6 % (ref 40.4–52.2)
HGB BLD-MCNC: 13.1 G/DL (ref 13.7–17.6)
MCH RBC QN AUTO: 32.7 PG (ref 27–32.7)
MCHC RBC AUTO-ENTMCNC: 32.3 G/DL (ref 32.6–36.4)
MCV RBC AUTO: 101.2 FL (ref 79.8–96.2)
PLATELET # BLD AUTO: 105 10*3/MM3 (ref 140–500)
PMV BLD AUTO: 13.1 FL (ref 6–12)
POTASSIUM BLD-SCNC: 3.6 MMOL/L (ref 3.5–5.2)
RBC # BLD AUTO: 4.01 10*6/MM3 (ref 4.6–6)
SODIUM BLD-SCNC: 140 MMOL/L (ref 136–145)
WBC NRBC COR # BLD: 9.52 10*3/MM3 (ref 4.5–10.7)

## 2018-02-14 PROCEDURE — 25010000002 HEPARIN (PORCINE) PER 1000 UNITS: Performed by: INTERNAL MEDICINE

## 2018-02-14 PROCEDURE — 80048 BASIC METABOLIC PNL TOTAL CA: CPT | Performed by: INTERNAL MEDICINE

## 2018-02-14 PROCEDURE — 97110 THERAPEUTIC EXERCISES: CPT

## 2018-02-14 PROCEDURE — 25010000002 LEVOFLOXACIN PER 250 MG: Performed by: INTERNAL MEDICINE

## 2018-02-14 PROCEDURE — 99232 SBSQ HOSP IP/OBS MODERATE 35: CPT | Performed by: INTERNAL MEDICINE

## 2018-02-14 PROCEDURE — 71046 X-RAY EXAM CHEST 2 VIEWS: CPT

## 2018-02-14 PROCEDURE — 85027 COMPLETE CBC AUTOMATED: CPT | Performed by: HOSPITALIST

## 2018-02-14 PROCEDURE — 63710000001 PREDNISONE PER 5 MG: Performed by: INTERNAL MEDICINE

## 2018-02-14 RX ADMIN — DIGOXIN 125 MCG: 0.12 TABLET ORAL at 11:19

## 2018-02-14 RX ADMIN — LEVOFLOXACIN 750 MG: 5 INJECTION, SOLUTION INTRAVENOUS at 11:19

## 2018-02-14 RX ADMIN — FUROSEMIDE 40 MG: 40 TABLET ORAL at 08:00

## 2018-02-14 RX ADMIN — PREDNISONE 15 MG: 5 TABLET ORAL at 08:00

## 2018-02-14 RX ADMIN — HEPARIN SODIUM 5000 UNITS: 5000 INJECTION, SOLUTION INTRAVENOUS; SUBCUTANEOUS at 08:00

## 2018-02-14 RX ADMIN — LEVOTHYROXINE SODIUM 137 MCG: 137 TABLET ORAL at 06:07

## 2018-02-14 RX ADMIN — PREDNISONE 7.5 MG: 5 TABLET ORAL at 17:50

## 2018-02-14 NOTE — PROGRESS NOTES
"  ENDOCRINE    Subjective   AND PLANS  Jesse Taylor is a 82 y.o. male.     Follow-up pituitary and diabetes.    No chest pain or shortness of breath.  In atrial fibrillation with controlled ventricular response.    Fasting glucose 120.  Continue on diet alone.    Afebrile.  On Levaquin.  Continue stress doses of prednisone 15 mg every morning and 7.5 mg every evening.  Continue levothyroxine 137 µg per day.  Restart AndroGel on discharge.    Objective   /73  Pulse 68  Temp 97.4 °F (36.3 °C) (Oral)   Resp 18  Ht 182.9 cm (72\")  Wt 83 kg (183 lb)  SpO2 99%  BMI 24.82 kg/m2  Physical Exam    Awake, alert, not in distress  No rales or wheezes.  Irregularly irregular heart rate and rhythm.  Systolic murmur at the base.  No gallop.  Abdomen soft, nontender.  No hepatojugular reflux.  No pedal edema.  No cyanosis or clubbing.      Lab Results (last 24 hours)     Procedure Component Value Units Date/Time    Blood Culture - Blood, [[id number]]  (Normal) Collected:  02/11/18 2156    Specimen:  Blood from Arm, Right Updated:  02/13/18 2216     Blood Culture No growth at 2 days    Blood Culture - Blood, [[id number]]  (Normal) Collected:  02/11/18 2156    Specimen:  Blood from Arm, Right Updated:  02/13/18 2216     Blood Culture No growth at 2 days    CBC (No Diff) [[id number]]  (Abnormal) Collected:  02/14/18 0548    Specimen:  Blood Updated:  02/14/18 0718     WBC 9.52 10*3/mm3      RBC 4.01 (L) 10*6/mm3      Hemoglobin 13.1 (L) g/dL      Hematocrit 40.6 %      .2 (H) fL      MCH 32.7 pg      MCHC 32.3 (L) g/dL      RDW 13.5 %      RDW-SD 49.8 fl      MPV 13.1 (H) fL      Platelets 105 (L) 10*3/mm3     Basic Metabolic Panel [[id number]]  (Abnormal) Collected:  02/14/18 0548    Specimen:  Blood Updated:  02/14/18 0730     Glucose 120 (H) mg/dL      BUN 39 (H) mg/dL      Creatinine 1.44 (H) mg/dL      Sodium 140 mmol/L      Potassium 3.6 mmol/L      Chloride 104 mmol/L      CO2 23.0 mmol/L      Calcium 8.2 " (L) mg/dL      eGFR Non African Amer 47 (L) mL/min/1.73      BUN/Creatinine Ratio 27.1 (H)     Anion Gap 13.0 mmol/L     Narrative:       The MDRD GFR formula is only valid for adults with stable renal function between ages 18 and 70.            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    Hyperlipidemia    Type 2 diabetes mellitus without complication, without long-term current use of insulin    Secondary hypothyroidism    Secondary adrenal insufficiency    Vitamin D deficiency    Coronary artery disease involving native coronary artery of native heart without angina pectoris    Aortic stenosis    Atrial fibrillation    Pneumonia (human metapneumovirus with secondary bacterial component)    Abnormal LFTs    Chronic systolic congestive heart failure    Stage 3 chronic kidney disease    Acute respiratory failure with hypoxia    Continue prednisone  Continue levothyroxine  Continue antibiotics per primary physician.  Restart Zocor 10 mg once a day on discharge.

## 2018-02-14 NOTE — THERAPY TREATMENT NOTE
Acute Care - Physical Therapy Treatment Note  [facility]     Patient Name: Jesse Taylor  : 1935  MRN: [id number]  Today's Date: 2018  Onset of Illness/Injury or Date of Surgery Date: 18     Referring Physician: Chandler    Admit Date: 2018    Visit Dx:    ICD-10-CM ICD-9-CM   1. Sepsis, due to unspecified organism A41.9 038.9     995.91   2. Pneumonia due to infectious organism, unspecified laterality, unspecified part of lung J18.9 136.9     484.8   3. JAELYN (acute kidney injury) N17.9 584.9   4. Atrial fibrillation with RVR I48.91 427.31   5. Other specified hypotension I95.89 458.8   6. 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   • Hyperlipidemia   • Type 2 diabetes mellitus without complication, without long-term current use of insulin   • Carotid artery disease   • Secondary hypothyroidism   • Secondary adrenal insufficiency   • Vitamin D deficiency   • Abnormal results of function studies of other organs and systems    • Nonrheumatic aortic valve insufficiency   • Coronary artery disease involving native coronary artery of native heart without angina pectoris   • Aortic stenosis   • Atrial fibrillation   • Sepsis   • Pneumonia (human metapneumovirus with secondary bacterial component)   • Abnormal LFTs   • Septic shock (due to PNA)   • JAELYN (acute kidney injury)   • Chronic systolic congestive heart failure   • Stage 3 chronic kidney disease   • Acute respiratory failure with hypoxia               Adult Rehabilitation Note       18 1315          Rehab Assessment/Intervention    Discipline other (see comments)   Student PT  -EE,EA,EE2      Document Type therapy note (daily note)   -EE,EA,EE2      Subjective Information no complaints;agree to therapy  -EE,EA,EE2      Patient Effort, Rehab Treatment good  -EE,EA,EE2      Symptoms Noted During/After Treatment none  -EE,EA,EE2      Precautions/Limitations fall precautions  -EE,EA,EE2      Recorded by [EE,EA,EE2] Brenna Judd, PT (r) Jose Mckeon PT Student (t) Brenna Judd PT (c)      Vital Signs    Pre SpO2 (%) 98  -EE,EA,EE2      O2 Delivery Pre Treatment room air  -EE,EA,EE2      O2 Delivery Intra Treatment room air  -EE,EA,EE2      Post SpO2 (%) 92  -EE,EA,EE2      O2 Delivery Post Treatment room air  -EE,EA,EE2      Pre Patient Position Supine  -EE,EA,EE2      Intra Patient Position Standing  -EE,EA,EE2      Post Patient Position Supine  -EE,EA,EE2      Recorded by [EE,EA,EE2] Brenna Judd, PT (r) Jose Mckeon PT Student (t) Brenna Judd PT (c)      Pain Assessment    Pain Assessment No/denies pain  -EE,EA,EE2      Recorded by [EE,EA,EE2] Brenna Judd, PT (r) Jose Mckeon PT Student (t) Brenna Judd PT (c)      Cognitive Assessment/Intervention    Current Cognitive/Communication Assessment functional  -EE,EA,EE2      Orientation Status oriented x 4  -EE,EA,EE2      Follows Commands/Answers Questions able to follow multi-step instructions;100% of the time  -EE,EA,EE2      Personal Safety WNL/WFL  -EE,EA,EE2      Personal Safety Interventions fall prevention program maintained;gait belt;nonskid shoes/slippers when out of bed;supervised activity  -EE,EA,EE2      Recorded by [EE,EA,EE2] Brenna Judd, PT (r) Jose Mckeon PT Student (t) Brenna Judd PT (c)      Bed Mobility, Assessment/Treatment    Bed Mobility, Assistive Device bed rails;head of bed elevated  -EE,EA,EE2      Bed Mob, Supine to Sit, [place] conditional independence  -EE,EA,EE2      Bed Mob, Sit to Supine, [place] conditional independence  -EE,EA,EE2      Recorded by [EE,EA,EE2] Brenna Judd, PT (r) Jose Mckeon, PT Student (t) Brenna Judd, PT (c)      Transfer  Assessment/Treatment    Transfers, Sit-Stand [place] contact guard assist  -EE,EA,EE2      Transfers, Stand-Sit [place] contact guard assist  -EE,EA,EE2      Transfers, Sit-Stand-Sit, Assist Device straight cane  -EE,EA,EE2      Transfer, Impairments strength decreased;impaired balance  -EE,EA,EE2      Transfer, Comment Pt. able to push up from bed with UE and transfer to SPC, requires wide JERED when standing  -EE,EA,EE2      Recorded by [EE,EA,EE2] Brenna Judd PT (r) Jose Mckeon, PT Student (t) Brenna Judd PT (c)      Gait Assessment/Treatment    Gait, [place] Level contact guard assist  -EE,EA,EE2      Gait, Assistive Device straight cane  -EE,EA,EE2      Gait, Distance (Feet) 200  -EE,EA,EE2      Gait, Gait Deviations forward flexed posture;step length decreased;stride width increased;weight-shifting ability decreased  -EE,EA,EE2      Gait, Safety Issues balance decreased during turns;step length decreased;weight-shifting ability decreased  -EE,EA,EE2      Gait, Impairments strength decreased;impaired balance  -EE,EA,EE2      Gait, Comment Pt. seems unsure with gait, maintains wide JERED and requires more stepps when turning  -EE,EA,EE2      Recorded by [EE,EA,EE2] Brenna Judd, NIURKA (r) Jose Mckeon PT Student (t) Brenna Judd PT (c)      Balance Skills Training    Sitting-Level of Assistance Independent  -EE,EA,EE2      Sitting-Balance Support Feet supported  -EE,EA,EE2      Recorded by [EE,EA,EE2] Brenna Judd PT (r) Jose Mckeon PT Student (t) Brenna Judd PT (c)      Positioning and Restraints    Pre-Treatment Position in bed  -EE,EA,EE2      Post Treatment Position bed  -EE,EA,EE2      In Bed supine;call light within reach;encouraged to call for assist  -EE,EA,EE2      Recorded by [EE,EA,EE2] Brenna Judd, PT (r) Jose Mckeon PT Student (t) Brenna Judd PT (c)        User Key  (r) = Recorded By, (t) = Taken By, (c) = Cosigned By    Initials Name Effective Dates    EE Brenna Judd PT 12/01/15 -      ADOLFO Mckeon, PT Student 02/05/18 -                 IP PT Goals       02/14/18 1359 02/13/18 0923       Bed Mobility PT LTG    Bed Mobility PT LTG, Date Established  02/13/18  -EE     Bed Mobility PT LTG, Time to Achieve  1 wk  -EE     Bed Mobility PT LTG, Activity Type  all bed mobility  -EE     Bed Mobility PT LTG, [place] Level  conditional independence  -EE     Bed Mobility PT LTG, Date Goal Reviewed 02/14/18  -EE (r) EA (t) EE (c)      Bed Mobility PT LTG, Outcome goal met  -EE (r) EA (t) EE (c)      Transfer Training PT LTG    Transfer Training PT LTG, Date Established 02/14/18  -EE (r) EA (t) EE (c) 02/13/18  -EE     Transfer Training PT LTG, Time to Achieve 1 wk  -EE (r) EA (t) EE (c) 1 wk  -EE     Transfer Training PT LTG, Activity Type all transfers  -EE (r) EA (t) EE (c) all transfers  -EE     Transfer Training PT LTG, [place] Level conditional independence  -EE (r) EA (t) EE (c) supervision required  -EE     Transfer Training PT LTG, Assist Device cane, straight  -EE (r) EA (t) EE (c) cane, straight  -EE     Transfer Training PT  LTG, Date Goal Reviewed 02/14/18  -EE (r) EA (t) EE (c)      Transfer Training PT LTG, Outcome goal ongoing  -EE (r) EA (t) EE (c)      Gait Training PT LTG    Gait Training Goal PT LTG, Date Established 02/14/18  -EE (r) EA (t) EE (c) 02/13/18  -EE     Gait Training Goal PT LTG, Time to Achieve 1 wk  -EE (r) EA (t) EE (c) 1 wk  -EE     Gait Training Goal PT LTG, [place] Level supervision required  -EE (r) EA (t) EE (c) supervision required  -EE     Gait Training Goal PT LTG, Assist Device cane, straight  -EE (r) EA (t) EE (c) cane, straight  -EE     Gait Training Goal PT LTG, Distance to Achieve  150  -EE     Gait Training Goal PT LTG, Date Goal Reviewed 02/14/18  -EE (r) EA (t) EE (c)      Gait Training Goal PT LTG, Outcome goal ongoing  -EE (r) EA (t) EE (c)        User Key  (r) = Recorded By, (t) = Taken By, (c) = Cosigned By    Initials Name Provider Type      Brenna Judd, PT Physical Therapist    ADOLFO Mckeon, PT Student PT Student          Physical Therapy Education     Title: PT OT SLP Therapies (Active)     Topic: Physical Therapy (Active)     Point: Mobility training (Done)    Learning Progress Summary    Learner Readiness Method Response Comment Documented by Status   Patient Acceptance E VU,NR  EA 02/14/18 1357 Done    Acceptance E VU,NR  EE 02/13/18 0923 Done               Point: Home exercise program (Done)    Learning Progress Summary    Learner Readiness Method Response Comment Documented by Status   Patient Acceptance E VU,NR  EE 02/13/18 0923 Done               Point: Body mechanics (Done)    Learning Progress Summary    Learner Readiness Method Response Comment Documented by Status   Patient Acceptance E VU,NR  EA 02/14/18 1357 Done                      User Key     Initials Effective Dates Name Provider Type Discipline     12/01/15 -  Brenna Judd, PT Physical Therapist PT    ADOLFO 02/05/18 -  Jose Mckeon, PT Student PT Student PT                    PT Recommendation and Plan  Anticipated Discharge Disposition: home with home health, home with assist  Planned Therapy Interventions: balance training, bed mobility training, gait training, home exercise program, patient/family education, strengthening, transfer training  PT Frequency: daily  Plan of Care Review  Plan Of Care Reviewed With: patient  Progress: improving  Outcome Summary/Follow up Plan: Pt. increased independence with functional transfers and bed mobility. Pt. continues to show signs of poor balance with ambulation such as increased number of steps when turning and wide JERED in standing. Pt. assessed on straight point wei, two people used, two people not necessary to walk with patient in the future. Pt. is close to baseline function, and can improve independence with all functional mobility with continued care.          Outcome Measures       02/14/18 1300 02/13/18 0900       How much help  from another person do you currently need...    Turning from your back to your side while in flat bed without using bedrails? 4  -EE (r) EA (t) EE (c) 4  -EE     Moving from lying on back to sitting on the side of a flat bed without bedrails? 3  -EE (r) EA (t) EE (c) 3  -EE     Moving to and from a bed to a chair (including a wheelchair)? 3  -EE (r) EA (t) EE (c) 3  -EE     Standing up from a chair using your arms (e.g., wheelchair, bedside chair)? 3  -EE (r) EA (t) EE (c) 3  -EE     Climbing 3-5 steps with a railing? 3  -EE (r) EA (t) EE (c) 3  -EE     To walk in hospital room? 3  -EE (r) EA (t) EE (c) 3  -EE     AM-PAC 6 Clicks Score 19  -EE (r) EA (t) 19  -EE     Functional Assessment    Outcome Measure Options  AM-PAC 6 Clicks Basic Mobility (PT)  -EE       User Key  (r) = Recorded By, (t) = Taken By, (c) = Cosigned By    Initials Name Provider Type    EE Brenna Judd, PT Physical Therapist    ADOLFO Mckeon, PT Student PT Student           Time Calculation:         PT Charges       02/14/18 1357          Time Calculation    Start Time 1315  -EE (r) EA (t) EE (c)      Stop Time 1330  -EE (r) EA (t) EE (c)      Time Calculation (min) 15 min  -EE (r) EA (t)      PT Received On 02/14/18  -EE (r) EA (t) EE (c)      PT - Next Appointment 02/15/18  -EE (r) EA (t) EE (c)        User Key  (r) = Recorded By, (t) = Taken By, (c) = Cosigned By    Initials Name Provider Type    EE Brenna Judd, PT Physical Therapist    ADOLFO Mckeon, PT Student PT Student          Therapy Charges for Today     Code Description Service Date Service Provider Modifiers Qty    71765488831 HC PT THER SUPP EA 15 MIN 2/14/2018 Jose Mckeon, PT Student GP 1    41055656391 HC PT THER PROC EA 15 MIN 2/14/2018 Jose Mckeon PT Student GP 1          PT G-Codes  Outcome Measure Options: AM-PAC 6 Clicks Basic Mobility (PT)    Jose Mckeon PT Student  2/14/2018

## 2018-02-14 NOTE — PLAN OF CARE
Problem: Fall Risk (Adult)  Goal: Identify Related Risk Factors and Signs and Symptoms  Outcome: Ongoing (interventions implemented as appropriate)   02/14/18 1731   Fall Risk   Fall Risk: Related Risk Factors age-related changes   Fall Risk: Signs and Symptoms presence of risk factors     Goal: Absence of Falls  Outcome: Ongoing (interventions implemented as appropriate)   02/14/18 1731   Fall Risk (Adult)   Absence of Falls making progress toward outcome       Problem: Patient Care Overview (Adult)  Goal: Plan of Care Review  Outcome: Ongoing (interventions implemented as appropriate)   02/14/18 1731   Coping/Psychosocial Response Interventions   Plan Of Care Reviewed With patient;madeleine   Patient Care Overview   Progress improving   Outcome Evaluation   Outcome Summary/Follow up Plan VSS, denies pain, no falls. New IV in right forearm. Chest xray completed. Weened to room air. Possible d/c home tomorow. WCM patient.      Goal: Adult Individualization and Mutuality  Outcome: Ongoing (interventions implemented as appropriate)    Goal: Discharge Needs Assessment  Outcome: Ongoing (interventions implemented as appropriate)   02/14/18 1731   Discharge Needs Assessment   Concerns To Be Addressed no discharge needs identified;denies needs/concerns at this time       Problem: Sepsis (Adult)  Goal: Signs and Symptoms of Listed Potential Problems Will be Absent or Manageable (Sepsis)  Outcome: Ongoing (interventions implemented as appropriate)   02/14/18 1731   Sepsis   Problems Assessed (Sepsis) all   Problems Present (Sepsis) none

## 2018-02-14 NOTE — PLAN OF CARE
Problem: Patient Care Overview (Adult)  Goal: Plan of Care Review   02/14/18 1359   Coping/Psychosocial Response Interventions   Plan Of Care Reviewed With patient   Patient Care Overview   Progress improving   Outcome Evaluation   Outcome Summary/Follow up Plan Pt. increased independence with functional transfers and bed mobility. Pt. continues to show signs of poor balance with ambulation such as increased number of steps when turning and wide JERED in standing. Pt. assessed on straight point wei, two people used, two people not necessary to walk with patient in the future. Pt. is close to baseline function, and can improve independence with all functional mobility with continued care.       Problem: Inpatient Physical Therapy  Goal: Bed Mobility Goal LTG- PT   02/14/18 1359   Bed Mobility PT LTG   Bed Mobility PT LTG, Date Goal Reviewed 02/14/18   Bed Mobility PT LTG, Outcome goal met     Goal: Transfer Training Goal 1 LTG- PT   02/14/18 1359   Transfer Training PT LTG   Transfer Training PT LTG, Date Established 02/14/18   Transfer Training PT LTG, Time to Achieve 1 wk   Transfer Training PT LTG, Activity Type all transfers   Transfer Training PT LTG, [place] Level conditional independence   Transfer Training PT LTG, Assist Device cane, straight   Transfer Training PT LTG, Date Goal Reviewed 02/14/18   Transfer Training PT LTG, Outcome goal ongoing     Goal: Gait Training Goal LTG- PT   02/14/18 1359   Gait Training PT LTG   Gait Training Goal PT LTG, Date Established 02/14/18   Gait Training Goal PT LTG, Time to Achieve 1 wk   Gait Training Goal PT LTG, [place] Level supervision required   Gait Training Goal PT LTG, Assist Device cane, straight   Gait Training Goal PT LTG, Date Goal Reviewed 02/14/18   Gait Training Goal PT LTG, Outcome goal ongoing

## 2018-02-14 NOTE — CONSULTS
"Adult Nutrition  Assessment/PES    Patient Name:  Jesse Taylor  YOB: 1935  MRN: [id number]  Admit Date:  2/11/2018    Assessment Date:  2/14/2018    Comments:  Consult d/t MST score of 4 per nurse admission screen.  Patient reports some weight loss, says lowest weight has been to 179#.  He reports he has been gaining weight back as of recently.  Weight history shows weight fluctuations, but no significant loss.    Patient reports good appetite, 75% x 1 meal per chart PO data.    RD to continue to follow as needed.          Reason for Assessment       02/14/18 1310    Reason for Assessment    Reason For Assessment/Visit admission assessment;identified at risk by screening criteria;nurse/nurse practitioner consult    Identified At Risk By Screening Criteria MST SCORE 2+    Diagnosis Diagnosis    Cardiac CAD;PAF    Endocrine DM Type 2    Infectious Disease Sepsis    Renal CKD              Nutrition/Diet History       02/14/18 1312    Nutrition/Diet History    Typical Food/Fluid Intake reports good appetite            Anthropometrics       02/14/18 1312    Anthropometrics    Height 182.9 cm (72.01\")    Weight 83 kg (182 lb 15.7 oz)    RD Documented Current Weight  83 kg (182 lb 15.7 oz)    Anthropometrics (Special Considerations)    RD Calculated BMI (kg/m2) 24.82    Ideal Body Weight (IBW)    Ideal Body Weight (IBW), Male (kg) 82.09    % Ideal Body Weight 101.32    Usual Body Weight (UBW)    Weight Loss Time Frame reports weight loss, but says gaining back, very vague on amounts; per chart weight history, fluctuations    Body Mass Index (BMI)    BMI (kg/m2) 24.86    BMI Grade 19.1 - 24.9 - normal            Labs/Tests/Procedures/Meds       02/14/18 1313    Labs/Tests/Procedures/Meds    Diagnostic Test/Procedure Review reviewed, pertinent    Labs/Tests Review Reviewed;Glucose;Creat;BUN;GFR;Alb    Procedure Review SLP    Swallow eval status Done    Type of SLP Evaluation Bedside    Medication Review " Reviewed, pertinent;Diuretic;Steroid    Significant Vitals reviewed, pertinent            Physical Findings       02/14/18 1314    Physical Findings/Assessment    Additional Documentation Physical Appearance (Group)    Physical Appearance    Skin --   B=19, intact              Nutrition Prescription Ordered       02/14/18 1314    Nutrition Prescription PO    Current PO Diet Soft Texture    Texture Chopped    Fluid Consistency Thin    Common Modifiers Cardiac            Evaluation of Received Nutrient/Fluid Intake       02/14/18 1314    PO Evaluation    Number of Meals 1    % PO Intake 75            Problem/Interventions:        Problem 1       02/14/18 1315    Nutrition Diagnoses Problem 1    Problem 1 Nutrition Appropriate for Condition at this Time    Etiology (related to) Factors Affecting Nutrition    Appetite Good    Signs/Symptoms (evidenced by) PO Intake;Report/Observation    Percent (%) intake recorded 75 %    Over number of meals 1    Reported/Observed By Patient                    Intervention Goal       02/14/18 1315    Intervention Goal    General Maintain nutrition    PO Maintain intake    Weight No significant weight loss            Nutrition Intervention       02/14/18 1315    Nutrition Intervention    RD/Tech Action Follow Tx progress;Care plan reviewd;Encourage intake              Education/Evaluation       02/14/18 1315    Education    Education Will Instruct as appropriate    Monitor/Evaluation    Monitor Per protocol;PO intake        Electronically signed by:  Rachel Bermudez RD  02/14/18 1:15 PM

## 2018-02-14 NOTE — PROGRESS NOTES
"Jesse Taylor  1935 82 y.o.  [id number]      Patient Care Team:  Justin Puga MD as PCP - General (Endocrinology)  Bony Figueroa MD as Consulting Physician (Cardiology)  Judah Gonzales MD as Surgeon (Neurosurgery)  Chino Fajardo II, MD as Consulting Physician (Hematology and Oncology)    CC: Severe aortic stenosis severe aortic insufficiency and congestive heart failure, failure to thrive    Interval History: He seems to be a little bit better      Objective   Vital Signs  Temp:  [95.1 °F (35.1 °C)-97.4 °F (36.3 °C)] 97.4 °F (36.3 °C)  Heart Rate:  [61-85] 71  Resp:  [18] 18  BP: ()/(54-86) 106/86    Intake/Output Summary (Last 24 hours) at 02/14/18 1152  Last data filed at 02/14/18 1119   Gross per 24 hour   Intake              400 ml   Output              775 ml   Net             -375 ml     Flowsheet Rows         First Filed Value    Admission Height  182.9 cm (72\") Documented at 02/11/2018 2137    Admission Weight  86.2 kg (190 lb) Documented at 02/11/2018 2137          Physical Exam:   General Appearance:    Alert,oriented, in no acute distress   Lungs:     Clear to auscultation,BS are equal    Heart:    Normal S1 and S2, RRR with 2/6 systolic ejection murmur, gallop or rub   HEENT:    Sclera are clear, no JVD or adenopathy   Abdomen:     Normal bowel sounds, soft non-tender, non-distended, no HSM   Extremities:   Moves all extremities well, no edema, no cyanosis, no             Redness, no rash     Medication Review:        digoxin 125 mcg Oral Daily   furosemide 40 mg Oral Daily   heparin (porcine) 5,000 Units Subcutaneous Q12H   levoFLOXacin 750 mg Intravenous Q48H   levothyroxine 137 mcg Oral Q AM   predniSONE 15 mg Oral Daily With Breakfast   predniSONE 7.5 mg Oral Daily With Dinner            I reviewed the patient's new clinical results.  I personally viewed and interpreted the patient's EKG/Telemetry data    Assessment/Plan  Active Hospital Problems (** Indicates Principal " Problem)    Diagnosis Date Noted   • **Septic shock (due to PNA) [A41.9, R65.21] 02/11/2018   • Chronic systolic congestive heart failure [I50.22] 02/12/2018   • Stage 3 chronic kidney disease [N18.3] 02/12/2018   • Acute respiratory failure with hypoxia [J96.01] 02/12/2018   • Pneumonia (human metapneumovirus with secondary bacterial component) [J18.9] 02/11/2018   • Abnormal LFTs [R79.89] 02/11/2018   • Atrial fibrillation [I48.91] 01/15/2018   • Aortic stenosis [I35.0] 01/11/2018   • Coronary artery disease involving native coronary artery of native heart without angina pectoris [I25.10] 11/22/2017   • Vitamin D deficiency [E55.9] 09/12/2017   • Secondary adrenal insufficiency [E27.49] 06/10/2016   • 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       He is really not much different maybe a little bit better from a cardiac standpoint we have nothing else to offer him he has severe aortic stenosis and severe aortic insufficiency medical therapy as her only option for treatment.  If he worsens some consideration of palliative care should be given we will see him as needed   02/14/18  11:52 AM

## 2018-02-14 NOTE — PROGRESS NOTES
"   LOS: 3 days   Patient Care Team:  Justin Puga MD as PCP - General (Endocrinology)  Bony Figueroa MD as Consulting Physician (Cardiology)  Judah Gonzales MD as Surgeon (Neurosurgery)  Chino Fajardo II, MD as Consulting Physician (Hematology and Oncology)    Chief Complaint: tired    Subjective     HPI Comments: Feeling and looking better. No complaints    Fever    Associated symptoms include coughing. Pertinent negatives include no chest pain, diarrhea, nausea or vomiting.   Shortness of Breath   Pertinent negatives include no chest pain, fever or vomiting.       Subjective:  Symptoms:  Improved.  He reports cough and weakness.  No shortness of breath, malaise, chest pain, headache, chest pressure, anorexia, diarrhea or anxiety.    Diet:  Poor intake.  No nausea or vomiting.    Activity level: Impaired due to weakness.    Pain:  He reports no pain.        History taken from: patient chart    Objective     Vital Signs  Temp:  [95.1 °F (35.1 °C)-97.4 °F (36.3 °C)] 97.4 °F (36.3 °C)  Heart Rate:  [61-85] 61  Resp:  [16-18] 16  BP: ()/(54-86) 104/61    Objective:  General Appearance:  Comfortable and in no acute distress.    Vital signs: (most recent): Blood pressure 104/61, pulse 61, temperature 97.4 °F (36.3 °C), temperature source Oral, resp. rate 16, height 182.9 cm (72\"), weight 83 kg (183 lb), SpO2 98 %.  Vital signs are normal.  No fever.    Output: Producing urine and producing stool.    HEENT: Normal HEENT exam.    Lungs:  Normal respiratory rate and normal effort.  There are rales (right base).  No wheezes.  (Anteriorly)  Heart: Normal rate.  Irregular rhythm.  Positive for murmur.    Abdomen: Abdomen is soft.  Bowel sounds are normal.   There is no abdominal tenderness.     Extremities: There is no dependent edema.    Pulses: Distal pulses are intact.    Neurological: Patient is alert and oriented to person, place and time.    Pupils:  Pupils are equal, round, and reactive to light.  "   Skin:  Warm and dry.  (Venous stasis changes BLE)            Results Review:     I reviewed the patient's new clinical results.  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    Hyperlipidemia    Type 2 diabetes mellitus without complication, without long-term current use of insulin    Secondary hypothyroidism    Secondary adrenal insufficiency    Vitamin D deficiency    Coronary artery disease involving native coronary artery of native heart without angina pectoris    Aortic stenosis    Atrial fibrillation    Pneumonia (human metapneumovirus with secondary bacterial component)    Abnormal LFTs    Chronic systolic congestive heart failure    Stage 3 chronic kidney disease    Acute respiratory failure with hypoxia          Plan:   (Continue LQN; Vanc and Zosyn dc'd  S/p IVFs per Card and Pulm  Back on home oral Lasix  Watch for volume overload given pt's cardiac history  Change in lung exam today, will recheck CXR today  Appreciate Dr. Puga's attention to pt, stress dosing steroid  Long d/w pt regarding DNR status, he confirms conditional code with me as he did with Dr. Figueroa  Tentatively plan for dc home tomorrow with HH).       Chava Roldan MD  02/14/18  1:11 PM    Time: 20min

## 2018-02-14 NOTE — PROGRESS NOTES
"Discharge Planning Assessment  [facility]     Patient Name: Jesse Taylor  MRN: [id number]  Today's Date: 2/14/2018    Admit Date: 2/11/2018          Discharge Needs Assessment       02/14/18 0950    Living Environment    Lives With child(dasia), adult    Living Arrangements house    Home Accessibility stairs to enter home    Number of Stairs to Enter Home 2    Type of Financial/Environmental Concern none    Transportation Available family or friend will provide    Living Environment    Provides Primary Care For no one    Quality Of Family Relationships supportive    Able to Return to Prior Living Arrangements yes    Living Arrangement Comments Pt lives with son Jovanni and daughter in law Freed    Discharge Needs Assessment    Concerns To Be Addressed no discharge needs identified;denies needs/concerns at this time    Readmission Within The Last 30 Days no previous admission in last 30 days    Equipment Currently Used at Home cane, straight    Equipment Needed After Discharge none    Discharge Disposition home or self-care    Discharge Planning Comments Pt plans home with family. Denies HH need            Discharge Plan       02/14/18 0952    Case Management/Social Work Plan    Plan Pt plans home with family. Denies HH need    Patient/Family In Agreement With Plan yes    Additional Comments Checked IMM. Met with pt bedside. Explained role of CCP. Confirmed face sheet correct. Pt reports his son Jovanni and daughter in law Freed live with him in a two story house. Pt uses a cane to ambulate. Pt reports he doesn't have a POA. Pt reports he has never used HH or SNF in past. Pt uses [facility] on [address] for prescriptions and states he is able to get most medications. When asked what medications he couldn't afford he stated he can't remember and \"just don't worry about it\". Pt reports he plans to go home with family assist. Refuses HH referral. Pt states \"If I get home I'll call for Home Health if I need it\". CCP to " follow for d/c needs. HEATHER Cerda        Discharge Placement     No information found                Demographic Summary       02/14/18 0950    Referral Information    Admission Type inpatient    Arrived From home or self-care    Record Reviewed medical record    Contact Information    Permission Granted to Share Information With facility ;family/designee            Functional Status       02/14/18 0950    Functional Status Current    Ambulation 2-->assistive person    Transferring 2-->assistive person    Toileting 2-->assistive person    Bathing 0-->independent    Dressing 0-->independent    Eating 0-->independent    Communication 0-->understands/communicates without difficulty    Swallowing (if score 2 or more for any item, consult Rehab Services) 0-->swallows foods/liquids without difficulty    Functional Status Prior    Ambulation 0-->independent    Transferring 0-->independent    Toileting 0-->independent    Bathing 0-->independent    Dressing 0-->independent    Eating 0-->independent    Communication 0-->understands/communicates without difficulty    Swallowing 0-->swallows foods/liquids without difficulty    IADL    Medications independent    Meal Preparation assistive person    Housekeeping assistive person    Laundry assistive person    Shopping assistive person    Oral Care independent            Psychosocial     None            Abuse/Neglect     None            Legal     None            Substance Abuse     None            Patient Forms     None          Verónica Diaz

## 2018-02-14 NOTE — PLAN OF CARE
Problem: Fall Risk (Adult)  Goal: Identify Related Risk Factors and Signs and Symptoms  Outcome: Ongoing (interventions implemented as appropriate)   02/12/18 0245   Fall Risk   Fall Risk: Related Risk Factors age-related changes   Fall Risk: Signs and Symptoms presence of risk factors     Goal: Absence of Falls  Outcome: Ongoing (interventions implemented as appropriate)   02/14/18 0314   Fall Risk (Adult)   Absence of Falls making progress toward outcome       Problem: Patient Care Overview (Adult)  Goal: Plan of Care Review  Outcome: Ongoing (interventions implemented as appropriate)   02/13/18 1731 02/14/18 0014 02/14/18 0314   Coping/Psychosocial Response Interventions   Plan Of Care Reviewed With --  patient --    Patient Care Overview   Progress improving --  --    Outcome Evaluation   Outcome Summary/Follow up Plan --  --  VSS. Pt rested well through the night. no c/o pain. meds given per order. pt self turn in bed. no falls. will continue to monitor.      Goal: Discharge Needs Assessment  Outcome: Ongoing (interventions implemented as appropriate)   02/13/18 0438 02/13/18 1731   Discharge Needs Assessment   Concerns To Be Addressed --  denies needs/concerns at this time   Living Environment   Transportation Available car;family or friend will provide --        Problem: Sepsis (Adult)  Goal: Signs and Symptoms of Listed Potential Problems Will be Absent or Manageable (Sepsis)  Outcome: Ongoing (interventions implemented as appropriate)   02/13/18 1731   Sepsis   Problems Assessed (Sepsis) all       Problem: Pain, Acute (Adult)  Goal: Identify Related Risk Factors and Signs and Symptoms  Outcome: Ongoing (interventions implemented as appropriate)   02/13/18 1731   Pain, Acute   Related Risk Factors (Acute Pain) infection     Goal: Acceptable Pain Control/Comfort Level  Outcome: Ongoing (interventions implemented as appropriate)   02/14/18 0314   Pain, Acute (Adult)   Acceptable Pain Control/Comfort Level  making progress toward outcome

## 2018-02-15 VITALS
HEIGHT: 72 IN | RESPIRATION RATE: 14 BRPM | OXYGEN SATURATION: 96 % | WEIGHT: 181.9 LBS | DIASTOLIC BLOOD PRESSURE: 70 MMHG | TEMPERATURE: 96.3 F | HEART RATE: 83 BPM | BODY MASS INDEX: 24.64 KG/M2 | SYSTOLIC BLOOD PRESSURE: 113 MMHG

## 2018-02-15 PROBLEM — I47.29 NSVT (NONSUSTAINED VENTRICULAR TACHYCARDIA) (HCC): Status: ACTIVE | Noted: 2018-02-15

## 2018-02-15 LAB
ANION GAP SERPL CALCULATED.3IONS-SCNC: 15 MMOL/L
BUN BLD-MCNC: 45 MG/DL (ref 8–23)
BUN/CREAT SERPL: 35.2 (ref 7–25)
CALCIUM SPEC-SCNC: 8 MG/DL (ref 8.6–10.5)
CHLORIDE SERPL-SCNC: 105 MMOL/L (ref 98–107)
CO2 SERPL-SCNC: 21 MMOL/L (ref 22–29)
CREAT BLD-MCNC: 1.28 MG/DL (ref 0.76–1.27)
DEPRECATED RDW RBC AUTO: 50.9 FL (ref 37–54)
ERYTHROCYTE [DISTWIDTH] IN BLOOD BY AUTOMATED COUNT: 13.6 % (ref 11.5–14.5)
GFR SERPL CREATININE-BSD FRML MDRD: 54 ML/MIN/1.73
GLUCOSE BLD-MCNC: 107 MG/DL (ref 65–99)
HCT VFR BLD AUTO: 43.9 % (ref 40.4–52.2)
HGB BLD-MCNC: 14.2 G/DL (ref 13.7–17.6)
MCH RBC QN AUTO: 33.3 PG (ref 27–32.7)
MCHC RBC AUTO-ENTMCNC: 32.3 G/DL (ref 32.6–36.4)
MCV RBC AUTO: 102.8 FL (ref 79.8–96.2)
MRSA SPEC QL CULT: NORMAL
NT-PROBNP SERPL-MCNC: ABNORMAL PG/ML (ref 0–1800)
PLATELET # BLD AUTO: 119 10*3/MM3 (ref 140–500)
PMV BLD AUTO: 13.2 FL (ref 6–12)
POTASSIUM BLD-SCNC: 3.7 MMOL/L (ref 3.5–5.2)
RBC # BLD AUTO: 4.27 10*6/MM3 (ref 4.6–6)
SODIUM BLD-SCNC: 141 MMOL/L (ref 136–145)
WBC NRBC COR # BLD: 9.53 10*3/MM3 (ref 4.5–10.7)

## 2018-02-15 PROCEDURE — 99232 SBSQ HOSP IP/OBS MODERATE 35: CPT | Performed by: INTERNAL MEDICINE

## 2018-02-15 PROCEDURE — 25010000002 HEPARIN (PORCINE) PER 1000 UNITS: Performed by: INTERNAL MEDICINE

## 2018-02-15 PROCEDURE — 83880 ASSAY OF NATRIURETIC PEPTIDE: CPT | Performed by: HOSPITALIST

## 2018-02-15 PROCEDURE — 80048 BASIC METABOLIC PNL TOTAL CA: CPT | Performed by: INTERNAL MEDICINE

## 2018-02-15 PROCEDURE — 85027 COMPLETE CBC AUTOMATED: CPT | Performed by: HOSPITALIST

## 2018-02-15 PROCEDURE — 36415 COLL VENOUS BLD VENIPUNCTURE: CPT | Performed by: INTERNAL MEDICINE

## 2018-02-15 PROCEDURE — 63710000001 PREDNISONE PER 5 MG: Performed by: INTERNAL MEDICINE

## 2018-02-15 RX ORDER — PREDNISONE 1 MG/1
5 TABLET ORAL
Status: DISCONTINUED | OUTPATIENT
Start: 2018-02-15 | End: 2018-02-15 | Stop reason: HOSPADM

## 2018-02-15 RX ORDER — PREDNISONE 10 MG/1
10 TABLET ORAL
Status: DISCONTINUED | OUTPATIENT
Start: 2018-02-16 | End: 2018-02-15 | Stop reason: HOSPADM

## 2018-02-15 RX ORDER — PREDNISONE 1 MG/1
15 TABLET ORAL
Qty: 90 TABLET | Refills: 0 | Status: SHIPPED | OUTPATIENT
Start: 2018-02-15

## 2018-02-15 RX ORDER — CARVEDILOL 3.12 MG/1
3.12 TABLET ORAL EVERY 12 HOURS SCHEDULED
Status: DISCONTINUED | OUTPATIENT
Start: 2018-02-15 | End: 2018-02-15 | Stop reason: HOSPADM

## 2018-02-15 RX ORDER — PREDNISONE 2.5 MG
7.5 TABLET ORAL
Qty: 90 TABLET | Refills: 0 | Status: SHIPPED | OUTPATIENT
Start: 2018-02-15 | End: 2018-03-05 | Stop reason: DRUGHIGH

## 2018-02-15 RX ORDER — CARVEDILOL 3.12 MG/1
3.12 TABLET ORAL EVERY 12 HOURS SCHEDULED
Qty: 60 TABLET | Refills: 0 | Status: SHIPPED | OUTPATIENT
Start: 2018-02-15 | End: 2018-03-05

## 2018-02-15 RX ORDER — LEVOFLOXACIN 750 MG/1
750 TABLET ORAL DAILY
Qty: 5 TABLET | Refills: 0 | Status: SHIPPED | OUTPATIENT
Start: 2018-02-15 | End: 2018-02-20

## 2018-02-15 RX ADMIN — LEVOTHYROXINE SODIUM 137 MCG: 137 TABLET ORAL at 06:25

## 2018-02-15 RX ADMIN — CARVEDILOL 3.12 MG: 3.12 TABLET, FILM COATED ORAL at 15:10

## 2018-02-15 RX ADMIN — HEPARIN SODIUM 5000 UNITS: 5000 INJECTION, SOLUTION INTRAVENOUS; SUBCUTANEOUS at 10:16

## 2018-02-15 RX ADMIN — PREDNISONE 15 MG: 5 TABLET ORAL at 10:18

## 2018-02-15 RX ADMIN — FUROSEMIDE 40 MG: 40 TABLET ORAL at 10:18

## 2018-02-15 RX ADMIN — DIGOXIN 125 MCG: 0.12 TABLET ORAL at 10:18

## 2018-02-15 RX ADMIN — PREDNISONE 5 MG: 5 TABLET ORAL at 15:11

## 2018-02-15 NOTE — DISCHARGE SUMMARY
Date of Admission: 2/11/2018  Date of Discharge:  2/15/2018  Primary Care Physician: Justin Pgua MD     Discharge Diagnosis:  Active Hospital Problems (** Indicates Principal Problem)    Diagnosis Date Noted   • **Septic shock (due to PNA) [A41.9, R65.21] 02/11/2018   • Chronic systolic congestive heart failure [I50.22] 02/12/2018   • Stage 3 chronic kidney disease [N18.3] 02/12/2018   • Acute respiratory failure with hypoxia [J96.01] 02/12/2018   • Pneumonia (human metapneumovirus with secondary bacterial component) [J18.9] 02/11/2018   • Abnormal LFTs [R79.89] 02/11/2018   • Atrial fibrillation [I48.91] 01/15/2018   • Aortic stenosis [I35.0] 01/11/2018   • Coronary artery disease involving native coronary artery of native heart without angina pectoris [I25.10] 11/22/2017   • Vitamin D deficiency [E55.9] 09/12/2017   • Secondary adrenal insufficiency [E27.49] 06/10/2016   • 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       Presenting Problem/History of Present Illness:  Other specified hypotension [I95.89]  JAELYN (acute kidney injury) [N17.9]  Atrial fibrillation with RVR [I48.91]  Septic shock [A41.9, R65.21]  Sepsis, due to unspecified organism [A41.9]  Pneumonia due to infectious organism, unspecified laterality, unspecified part of lung [J18.9]     Information patient himself in discussion with ER physician and review the previous hospitalization records  Patient is a 82-year-old male with a complicated past medical history as noted below was evidently doing okay until couple days ago when he started to feel weak and  tired.  He says that he doesn't know why and how he got to the hospital but EMS was called by his family members for him not feeling very well.  He was having some cough and congestion as well as a feeling that his heart was racing.  He was having poor appetite weakness fever as well as vomiting and diarrhea.  Because of his respiratory pattern and wheezing he did receive a DuoNeb and Route by EMS.  In the emergency room he was found to have a temperature of 103 , elevated lactic acid level indeterminate elevation of troponin elevated BNP.  His chest x-ray did not reveal any specific abnormality and stool antigen assay was negative but because of his appearance and fever and prior symptoms of cough and decrease appetite patient is given empiric diagnosis of pneumonia in the community-acquired setting and was started on antibiotic therapy with plans to be admitted to the floor.  Patient subsequently developed blood pressure of 80 systolic requiring IV fluid bolus and pressors.  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    Hospital Course:  The patient is a 82 y.o. male who presented with septic shock 2/2 PNA. He was admitted, given IVF resuscitation with caution in setting of CHF and severe AS, empiric antibiotics, and stress dose steroids. Cardiology, Endocrinology, and Pulmonology were consulted. He improved on broad spectrum antibiotics. RVP was positive for meta pneumovirus and he had additional bacterial component. Antibiotics were narrowed to Levaquin when cultures were negative. He had repeat CXR which showed no new effusion or infiltrate and is currently stable on room air. I will give Levaquin PO to complete 10 day course and continue his current dose of prednisone for hypopituitarism. He can taper the steroids in about 1 week after follow up with PCP  and Endocrinology.    His Aortic Stenosis/Insufficiency is severe. He is not interventional candidate. Cardiology suggested possible palliative care discussion should he worsen from cardiac standpoint. BNP is elevated at 11 thousand but unchanged from admission. DNR/Conditional Code status was confirmed by by associated, Dr Roldan. He is currently stable for discharge home with planned home health.    Exam Today:  General Appearance:  Comfortable and in no acute distress.    Output: Producing urine and producing stool.    HEENT: Normal HEENT exam.    Lungs:  Normal respiratory rate and normal effort. Decreased breath sounds bilaterally but no wheezes or rales on exam today.  Heart: Normal rate.  Irregular rhythm.  Positive for murmur.    Abdomen: Abdomen is soft.  Bowel sounds are normal.   There is no abdominal tenderness.     Extremities: There is no dependent edema.    Pulses: Distal pulses are intact.    Neurological: Patient is alert and oriented to person, place and time.    Pupils:  Pupils are equal, round, and reactive to light.    Skin:  Warm and dry.  (Venous stasis changes BLE).    Procedures Performed:         Consults:   Consults     Date and Time Order Name Status Description    2/12/2018 1214 Inpatient Consult to Endocrinology Completed     2/12/2018 1123 Inpatient Consult to Pulmonology      2/12/2018 0406 Inpatient Consult to Pulmonology Completed     2/12/2018 0043 Inpatient Consult to Cardiology Completed     2/11/2018 2305 Pulmonology (on-call MD unless specified) Completed     2/11/2018 2253 [facility] (on-call MD unless specified) Completed            [place] [facility] [place] Medications:   Jesse Taylor   Home Medication Instructions RAF:[id number]    Printed on:02/15/18 1211   Medication Information                      aspirin 81 MG EC tablet  Take 1 tablet by mouth Daily.             bimatoprost (LUMIGAN) 0.01 % ophthalmic drops  Apply 1 drop to eye nightly.  Both eyes.             brimonidine (ALPHAGAN P) 0.1 % solution ophthalmic solution  Apply 1 drop to eye Every 12 (Twelve) Hours.             carvedilol (COREG) 3.125 MG tablet  Take 1 tablet by mouth Every 12 (Twelve) Hours.             Cholecalciferol (VITAMIN D3) 1000 UNITS capsule  Take  by mouth.             digoxin (LANOXIN) 125 MCG tablet  Take 1 tablet by mouth Daily.             furosemide (LASIX) 40 MG tablet  Take 1 tablet by mouth Daily.             levoFLOXacin (LEVAQUIN) 750 MG tablet  Take 1 tablet by mouth Daily for 5 doses.             levothyroxine (SYNTHROID, LEVOTHROID) 137 MCG tablet  Take 1 tablet by mouth Daily.             Multiple Vitamin (MULTI VITAMIN DAILY PO)  Take 1 tablet by mouth daily.             nitroglycerin (NITROSTAT) 0.4 MG SL tablet  1 under the tongue as needed for angina, may repeat q5mins for up three doses             potassium chloride (K-DUR,KLOR-CON) 20 MEQ CR tablet  Take 1 tablet by mouth Daily.             predniSONE (DELTASONE) 2.5 MG tablet  Take 3 tablets by mouth Daily With Dinner.             predniSONE (DELTASONE) 5 MG tablet  Take 3 tablets by mouth Daily With Breakfast.             simvastatin (ZOCOR) 40 MG tablet  Take 1 tablet by mouth Every Night.             Testosterone 20.25 MG/ACT (1.62%) gel  Apply 4 pump presses one time daily as directed                 Discharge Diet:   Diet Instructions     Diet: Soft Texture, Cardiac; Thin Liquids, No Restrictions; Chopped       Discharge Diet:   Soft Texture  Cardiac      Fluid Consistency:  Thin Liquids, No Restrictions   Soft Options:  Chopped                 Activity at Discharge:   Activity Instructions     Activity as Tolerated                     Follow-up Appointments:  Future Appointments  Date Time Provider Department Center   3/12/2018 1:45 PM MD [id number] None   5/4/2018 2:20 PM MD [id number] None   8/6/2018 2:00 PM MD [id number] None      Additional Instructions for the Follow-ups that You Need to Schedule     Discharge Follow-up with PCP    As directed    Follow Up Details:  1-2 weeks           Discharge Follow-up with Specified Provider: Dr Figueroa, Cardiology    As directed    To:  Dr Figueroa, Cardiology    Follow Up Details:  as scheduled           Discharge Follow-up with Specified Provider: Dr Puga, Endocrinology    As directed    To:  Dr Puga, Endocrinology    Follow Up Details:  as scheduled           Referral to Home Health    As directed    Face to Face Visit Date:  2/15/2018    Follow-up Provider for Plan of Care?:  I treated the patient in an acute care facility and will not continue treatment after discharge.    Follow-up Provider:  NIKI PUGA [[id number]]    Reason/Clinical Findings:  weakness, PNA, aortic stenosis    Describe mobility limitations that make leaving home difficult:  see above    Nursing/Therapeutic Services Requested:  Physical Therapy Occupational Therapy    PT orders:  Therapeutic exercise Strengthening    Occupational orders:  Activities of daily living Energy conservation Strengthening    Frequency:  1 Week 1                     Test Results Pending at Discharge:   Order Current Status    Blood Culture - Blood, Preliminary result    Blood Culture - Blood, Preliminary result           Zaid Issa MD  02/15/18  11:12 AM    Time Spent on Discharge Activities: >30 minutes

## 2018-02-15 NOTE — PROGRESS NOTES
Continued Stay Note  [facility]     Patient Name: Jesse Taylor  MRN: [id number]  Today's Date: 2/15/2018    Admit Date: 2/11/2018          Discharge Plan       02/15/18 1228    Case Management/Social Work Plan    Plan Home     Patient/Family In Agreement With Plan yes    Additional Comments Followed up with patient at bedside, introduced self and explained dc orders. Pt states he is going home with his son and daughter in law. CCP discussed HH services and provided pt HH list. Pt refuses referral at this time, even if pt wanted to cancel later. Pt declined. Notified TOM BAL RN. Soto SANTOS/CCP              Discharge Codes     None        Expected Discharge Date and Time     Expected Discharge Date Expected Discharge Time    Feb 15, 2018             Angelita Howell, RN

## 2018-02-15 NOTE — PLAN OF CARE
Problem: Fall Risk (Adult)  Goal: Identify Related Risk Factors and Signs and Symptoms  Outcome: Ongoing (interventions implemented as appropriate)    Goal: Absence of Falls  Outcome: Ongoing (interventions implemented as appropriate)      Problem: Patient Care Overview (Adult)  Goal: Plan of Care Review  Outcome: Ongoing (interventions implemented as appropriate)   02/15/18 0510   Coping/Psychosocial Response Interventions   Plan Of Care Reviewed With patient   Patient Care Overview   Progress improving   Outcome Evaluation   Outcome Summary/Follow up Plan vitals stable. pt denies pain. a-fib has been controlled all night. plan is to possibly discharge the pt today. will continue to monitor.       02/15/18 0510   Coping/Psychosocial Response Interventions   Plan Of Care Reviewed With patient   Patient Care Overview   Progress improving   Outcome Evaluation   Outcome Summary/Follow up Plan vitals stable. pt denies pain. a-fib has been controlled all night. plan is to possibly discharge the pt today. will continue to monitor.        Problem: Sepsis (Adult)  Goal: Signs and Symptoms of Listed Potential Problems Will be Absent or Manageable (Sepsis)  Outcome: Ongoing (interventions implemented as appropriate)

## 2018-02-15 NOTE — PROGRESS NOTES
"  ENDOCRINE    Subjective   AND PLANS  Jesse Taylor is a 82 y.o. male.     Follow-up  pituitary and diabetes    No chest pain or shortness of breath off oxygen.  Afebrile.  Off antibiotics.  Taper prednisone down to maintenance dose.  Continue levothyroxine 137 µg per day.  Restart AndroGel on discharge.    Blood sugar in good control on diet alone.    Follow-up with me as scheduled next month.      Objective   /66 (BP Location: Left arm, Patient Position: Lying)  Pulse 67  Temp 97.4 °F (36.3 °C) (Oral)   Resp 16  Ht 182.9 cm (72.01\")  Wt 82.5 kg (181 lb 14.4 oz)  SpO2 99%  BMI 24.66 kg/m2  Physical Exam    Awake, alert, not in distress.  No rales or wheezes.  Irregular rhythm.  No gallop.  Systolic murmur at the base.  Abdomen soft, nontender.  No cyanosis or pedal edema.    Lab Results (last 24 hours)     Procedure Component Value Units Date/Time    Blood Culture - Blood, [[id number]]  (Normal) Collected:  02/11/18 2156    Specimen:  Blood from Arm, Right Updated:  02/14/18 2209     Blood Culture No growth at 3 days    Blood Culture - Blood, [[id number]]  (Normal) Collected:  02/11/18 2156    Specimen:  Blood from Arm, Right Updated:  02/14/18 2209     Blood Culture No growth at 3 days    CBC (No Diff) [[id number]]  (Abnormal) Collected:  02/15/18 0419    Specimen:  Blood Updated:  02/15/18 0454     WBC 9.53 10*3/mm3      RBC 4.27 (L) 10*6/mm3      Hemoglobin 14.2 g/dL      Hematocrit 43.9 %      .8 (H) fL      MCH 33.3 (H) pg      MCHC 32.3 (L) g/dL      RDW 13.6 %      RDW-SD 50.9 fl      MPV 13.2 (H) fL      Platelets 119 (L) 10*3/mm3     Basic Metabolic Panel [[id number]]  (Abnormal) Collected:  02/15/18 0419    Specimen:  Blood Updated:  02/15/18 0508     Glucose 107 (H) mg/dL      BUN 45 (H) mg/dL      Creatinine 1.28 (H) mg/dL      Sodium 141 mmol/L      Potassium 3.7 mmol/L      Chloride 105 mmol/L      CO2 21.0 (L) mmol/L      Calcium 8.0 (L) mg/dL      eGFR Non  Amer 54 (L) " mL/min/1.73      BUN/Creatinine Ratio 35.2 (H)     Anion Gap 15.0 mmol/L     Narrative:       The MDRD GFR formula is only valid for adults with stable renal function between ages 18 and 70.    BNP [[id number]]  (Abnormal) Collected:  02/15/18 0419    Specimen:  Blood Updated:  02/15/18 0510     proBNP 98494.0 (H) pg/mL     Narrative:       Among patients with dyspnea, NT-proBNP is highly sensitive for the detection of acute congestive heart failure. In addition NT-proBNP of <300 pg/ml effectively rules out acute congestive heart failure with 99% negative predictive value.    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    Hyperlipidemia    Type 2 diabetes mellitus without complication, without long-term current use of insulin    Secondary hypothyroidism    Secondary adrenal insufficiency    Vitamin D deficiency    Coronary artery disease involving native coronary artery of native heart without angina pectoris    Aortic stenosis    Atrial fibrillation    Pneumonia (human metapneumovirus with secondary bacterial component)    Abnormal LFTs    Chronic systolic congestive heart failure    Stage 3 chronic kidney disease    Acute respiratory failure with hypoxia    NSVT (nonsustained ventricular tachycardia)    Taper prednisone back to maintenance dose of 5 mg every morning and 2-1/2 mg evening  Continue levothyroxine 137 µg per day.  Restart AndroGel.  Restart Zocor.  Follow-up with me next month.

## 2018-02-15 NOTE — PROGRESS NOTES
"Jesse Taylor  1935 82 y.o.  [id number]      Patient Care Team:  Justin Puga MD as PCP - General (Endocrinology)  Bony Figueroa MD as Consulting Physician (Cardiology)  Judah Gonzales MD as Surgeon (Neurosurgery)  Chino Fajardo II, MD as Consulting Physician (Hematology and Oncology)    CC: Severe aortic stenosis severe aortic insufficiency and congestive heart failure, failure to thrive    Interval History: He is clinically unchanged she had a long episode of sustained VT last night      Objective   Vital Signs  Temp:  [97 °F (36.1 °C)-97.5 °F (36.4 °C)] 97.4 °F (36.3 °C)  Heart Rate:  [59-81] 67  Resp:  [16] 16  BP: (102-118)/(60-67) 111/66    Intake/Output Summary (Last 24 hours) at 02/15/18 1155  Last data filed at 02/15/18 1000   Gross per 24 hour   Intake              930 ml   Output              861 ml   Net               69 ml     Flowsheet Rows         First Filed Value    Admission Height  182.9 cm (72\") Documented at 02/11/2018 2137    Admission Weight  86.2 kg (190 lb) Documented at 02/11/2018 2137          Physical Exam:   General Appearance:    Alert,oriented, in no acute distress   Lungs:     Clear to auscultation,BS are equal    Heart:    Normal S1 and S2, RRR with 2/6 systolic ejection murmur, gallop or rub   HEENT:    Sclera are clear, no JVD or adenopathy   Abdomen:     Normal bowel sounds, soft non-tender, non-distended, no HSM   Extremities:   Moves all extremities well, no edema, no cyanosis, no             Redness, no rash     Medication Review:        digoxin 125 mcg Oral Daily   furosemide 40 mg Oral Daily   heparin (porcine) 5,000 Units Subcutaneous Q12H   levoFLOXacin 750 mg Intravenous Q48H   levothyroxine 137 mcg Oral Q AM   predniSONE 15 mg Oral Daily With Breakfast   predniSONE 7.5 mg Oral Daily With Dinner            I reviewed the patient's new clinical results.  I personally viewed and interpreted the patient's EKG/Telemetry data    Assessment/Plan  Active " Hospital Problems (** Indicates Principal Problem)    Diagnosis Date Noted   • **Septic shock (due to PNA) [A41.9, R65.21] 02/11/2018   • Chronic systolic congestive heart failure [I50.22] 02/12/2018   • Stage 3 chronic kidney disease [N18.3] 02/12/2018   • Acute respiratory failure with hypoxia [J96.01] 02/12/2018   • Pneumonia (human metapneumovirus with secondary bacterial component) [J18.9] 02/11/2018   • Abnormal LFTs [R79.89] 02/11/2018   • Atrial fibrillation [I48.91] 01/15/2018   • Aortic stenosis [I35.0] 01/11/2018   • Coronary artery disease involving native coronary artery of native heart without angina pectoris [I25.10] 11/22/2017   • Vitamin D deficiency [E55.9] 09/12/2017   • Secondary adrenal insufficiency [E27.49] 06/10/2016   • 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       He has a severe cardiomyopathy with an EF of in the 30-35% range.  Severe aortic stenosis severe aortic insufficiency not a candidate for TAVR or surgical repair.  He is in with a probably viral syndrome last night and not her head essentially sustained VT for a period of time on his monitor he was asymptomatic he's been doing well since then.  He is not a candidate for any other aggressive therapy given his overall prognosis I'm going to add a very low-dose of a beta blocker to his regimen and see if he can tolerate that   02/15/18  11:55 AM

## 2018-02-16 LAB
BACTERIA SPEC AEROBE CULT: NORMAL
BACTERIA SPEC AEROBE CULT: NORMAL

## 2018-02-19 NOTE — PROGRESS NOTES
Case Management Discharge Note    Final Note: Home, pt refused  services. [facility] SANTOS/CCP    Discharge Placement     No information found        Other: Other    Discharge Codes: 01  Discharge to home

## 2018-03-05 ENCOUNTER — OFFICE VISIT (OUTPATIENT)
Dept: ENDOCRINOLOGY | Age: 83
End: 2018-03-05

## 2018-03-05 VITALS
OXYGEN SATURATION: 94 % | HEART RATE: 44 BPM | SYSTOLIC BLOOD PRESSURE: 110 MMHG | HEIGHT: 72 IN | DIASTOLIC BLOOD PRESSURE: 62 MMHG | BODY MASS INDEX: 25.55 KG/M2 | WEIGHT: 188.6 LBS

## 2018-03-05 DIAGNOSIS — E78.5 HYPERLIPIDEMIA, UNSPECIFIED HYPERLIPIDEMIA TYPE: ICD-10-CM

## 2018-03-05 DIAGNOSIS — E11.9 TYPE 2 DIABETES MELLITUS WITHOUT COMPLICATION, WITHOUT LONG-TERM CURRENT USE OF INSULIN (HCC): ICD-10-CM

## 2018-03-05 DIAGNOSIS — I50.22 CHRONIC SYSTOLIC CONGESTIVE HEART FAILURE (HCC): Chronic | ICD-10-CM

## 2018-03-05 DIAGNOSIS — E03.8 SECONDARY HYPOTHYROIDISM: ICD-10-CM

## 2018-03-05 DIAGNOSIS — D35.2 BENIGN NEOPLASM OF PITUITARY GLAND (HCC): ICD-10-CM

## 2018-03-05 DIAGNOSIS — E27.49 SECONDARY ADRENAL INSUFFICIENCY (HCC): ICD-10-CM

## 2018-03-05 DIAGNOSIS — I35.0 NONRHEUMATIC AORTIC VALVE STENOSIS: Primary | ICD-10-CM

## 2018-03-05 DIAGNOSIS — I25.10 CORONARY ARTERY DISEASE INVOLVING NATIVE CORONARY ARTERY OF NATIVE HEART WITHOUT ANGINA PECTORIS: ICD-10-CM

## 2018-03-05 DIAGNOSIS — E89.3 HYPOPITUITARISM AFTER ADENOMA RESECTION (HCC): ICD-10-CM

## 2018-03-05 DIAGNOSIS — D47.2 MGUS (MONOCLONAL GAMMOPATHY OF UNKNOWN SIGNIFICANCE): ICD-10-CM

## 2018-03-05 DIAGNOSIS — E55.9 VITAMIN D DEFICIENCY: ICD-10-CM

## 2018-03-05 DIAGNOSIS — I48.19 PERSISTENT ATRIAL FIBRILLATION (HCC): ICD-10-CM

## 2018-03-05 PROCEDURE — 99214 OFFICE O/P EST MOD 30 MIN: CPT | Performed by: INTERNAL MEDICINE

## 2018-03-05 RX ORDER — TESTOSTERONE 16.2 MG/G
GEL TRANSDERMAL
Qty: 450 G | Refills: 2 | Status: SHIPPED | OUTPATIENT
Start: 2018-03-05

## 2018-03-05 NOTE — PROGRESS NOTES
Subjective   Kieshaus VÍCTOR Taylor is a 82 y.o. male.     HPI Comments: F/u for dm 2 hypopituitarism not testing bs / last dm eye exam July 2016/ last dm foot exam today with dr Puga     Diabetes        Patient is a 82-year-old male who came in for follow-up.  He was admitted in February 2018 for metapneumovirus  Infection.  He denies any fever or cough.    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 He has been on replacement AndroGel 1.62%  pumps once a day, levothyroxine 150 mcg once a day and prednisone 5 mg in the morning and 2-1/2   mg in the evening.   He ran out of Androgel for a while.  He has growth hormone deficiency and was using growth hormone in the past which was discontinued because of arthralgia. He was seen by Dr. Gonzales in 2015 who advised to repeat MRI in 2018. His last eye examination was in 8/17.       He has urinary urgency but no dysuria, dribbling, or retention.   PSA done in 9/17 was normal.      He has hyperlipidemia which is controlled by Zocor 20 mg once a day.  He denies any myalgia.      He has type 2 diabetes mellitus and is on diet alone. He has no weight change since September 2017. His last meal was at 8 AM.      He has vitamin D deficiency and has been on vitamin D 1000 units per day and MVI 1 tab/day;      He has IgA monoclonal gammopathy and follows with Dr. Fajardo. He is back on aspirin 81 mg per day.  He denies bone pain.  He is overdue for follow-up with Dr. Fajardo.    He has coronary artery disease, aortic valve stenosis, aortic valve regurgitation and atrial fibrillation.  Patient is not amenable to aortic valve replacement and repair.  He has a previous history of inferior myocardial infarction. He denies paroxysmal nocturnal dyspnea or orthopnea.  He has pedal edema which is improved after a night's rest.  He follows with Dr. Figueroa        The following portions of the patient's history were reviewed and updated as appropriate:  "allergies, current medications, past family history, past medical history, past social history, past surgical history and problem list.    Review of Systems   Constitutional: Negative.    HENT: Negative.    Eyes: Negative.    Respiratory: Positive for shortness of breath.    Cardiovascular: Negative.    Gastrointestinal: Negative.    Endocrine: Negative.    Genitourinary: Negative.    Musculoskeletal: Negative.    Skin: Negative.    Allergic/Immunologic: Negative.    Neurological: Negative.    Hematological: Negative.    Psychiatric/Behavioral: Negative.        Objective      Vitals:    03/05/18 1326   BP: 110/62   BP Location: Right arm   Patient Position: Sitting   Cuff Size: Small Adult   Pulse: (!) 44   SpO2: 94%   Weight: 85.5 kg (188 lb 9.6 oz)   Height: 182.9 cm (72.01\")     Physical Exam   Constitutional: He is oriented to person, place, and time. He appears well-developed and well-nourished. No distress.   HENT:   Head: Normocephalic.   Nose: Nose normal.   Mouth/Throat: No oropharyngeal exudate.   Eyes: Conjunctivae and EOM are normal. Right eye exhibits no discharge. Left eye exhibits no discharge. No scleral icterus.   Neck: Neck supple. No JVD present. No tracheal deviation present. No thyromegaly present.   Cardiovascular: Normal rate and intact distal pulses.  Exam reveals no gallop and no friction rub.    Murmur (Systolic murmur at the base) heard.  Irregularly irregular rhythm   Pulmonary/Chest: Effort normal and breath sounds normal. No respiratory distress. He has no wheezes.   Abdominal: Soft. Bowel sounds are normal. He exhibits no distension. There is no tenderness.   Musculoskeletal: Normal range of motion. He exhibits edema (++edema). He exhibits no tenderness or deformity.   No plantar ulcers   Lymphadenopathy:     He has no cervical adenopathy.   Neurological: He is alert and oriented to person, place, and time.   Skin: No rash noted. No erythema. No pallor.   Dry flaky skin in distal lower " extremities.  Chronic venous stasis changes on lower extremities   Psychiatric: He has a normal mood and affect. His behavior is normal.     Admission on 02/11/2018, Discharged on 02/15/2018   No results displayed because visit has over 200 results.        Assessment/Plan   Ophus was seen today for diabetes.    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  Continue levothyroxine 137 µg per day.  Continue prednisone 5 mg every morning and 2.5 mg every evening.  Continue Zocor 20 mg once a day.  Continue vitamin D 1000 units per day.  Follow-up wit Dr. Fajardo, Dr. Figueroa and Dr. Gonzales.  Continue no concentrated sweet diet.    RTC 4 mos.    Send copy of my notes and labs to Dr. Figueroa, Dr. Fajardo, and Dr. Gonzales.

## 2018-03-06 ENCOUNTER — PRIOR AUTHORIZATION (OUTPATIENT)
Dept: ENDOCRINOLOGY | Age: 83
End: 2018-03-06

## 2018-03-06 NOTE — TELEPHONE ENCOUNTER
PT'S PA FOR ANDROGEL HAS BEEN SUBMITTED TO [facility] ON 3/6/18 AND WAS APPROVED ON 3/6/18. PHARMACY WAS NOTIFIED.

## 2018-03-15 RX ORDER — DIGOXIN 125 MCG
125 TABLET ORAL
Status: ACTIVE | OUTPATIENT
Start: 2018-03-15

## 2019-07-03 NOTE — PROGRESS NOTES
"  ENDOCRINE    Subjective   AND PLANS  Jesse Taylor is a 82 y.o. male.     Follow-up pituitary and diabetes.    Feels better.  No shortness of breath with supplemental oxygen.  Fasting glucose 131.  Hemoglobin A1c 5.9%.  Continue on diet alone for diabetes mellitus.    Free T4 normal at 1.22 ng per DL.  Continue levothyroxine 137 µg per day.  Will switch to stress dose of oral prednisone  Restart AndroGel on discharge.    Cholesterol 86.  HDL 23.  LDL 45.  Reduce Zocor to 10 mg once a day.    Objective   /78 (BP Location: Left arm, Patient Position: Lying)  Pulse 89  Temp 96.1 °F (35.6 °C) (Oral)   Resp 16  Ht 182.9 cm (72\")  Wt 83 kg (182 lb 14.4 oz)  SpO2 95%  BMI 24.81 kg/m2  Physical Exam    Awake, alert, not dyspneic, not in distress.  Neck veins are not visible at 45°   Thyroid is not enlarged  Irregularly irregular heart rate and rhythm.  No gallop.  Systolic murmur at the base.    Abdomen soft, nontender.  No hepatojugular reflux.  No pedal edema.  No cyanosis or clubbing.      Lab Results (last 24 hours)     Procedure Component Value Units Date/Time    CBC & Differential [[id number]] Collected:  02/12/18 1213    Specimen:  Blood Updated:  02/12/18 1302    Narrative:       The following orders were created for panel order CBC & Differential.  Procedure                               Abnormality         Status                     ---------                               -----------         ------                     Scan Slide[[id number]]                                                                  CBC Auto Differential[[id number]]        Abnormal            Final result                 Please view results for these tests on the individual orders.    CBC Auto Differential [[id number]]  (Abnormal) Collected:  02/12/18 1213    Specimen:  Blood Updated:  02/12/18 1302     WBC 8.30 10*3/mm3      RBC 4.36 (L) 10*6/mm3      Hemoglobin 14.4 g/dL      Hematocrit 45.4 %      .1 (H) fL      MCH " 33.0 (H) pg      MCHC 31.7 (L) g/dL      RDW 13.8 %      RDW-SD 53.2 fl      MPV 12.9 (H) fL      Platelets 86 (L) 10*3/mm3      Neutrophil % 90.7 (H) %      Lymphocyte % 5.3 (L) %      Monocyte % 3.6 (L) %      Eosinophil % 0.0 (L) %      Basophil % 0.2 %      Immature Grans % 0.2 %      Neutrophils, Absolute 7.52 10*3/mm3      Lymphocytes, Absolute 0.44 (L) 10*3/mm3      Monocytes, Absolute 0.30 10*3/mm3      Eosinophils, Absolute 0.00 10*3/mm3      Basophils, Absolute 0.02 10*3/mm3      Immature Grans, Absolute 0.02 10*3/mm3      nRBC 0.0 /100 WBC     Lactic Acid, Plasma [[id number]]  (Abnormal) Collected:  02/12/18 1213    Specimen:  Blood Updated:  02/12/18 1304     Lactate 2.4 (C) mmol/L     Digoxin Level [[id number]]  (Normal) Collected:  02/12/18 1213    Specimen:  Blood Updated:  02/12/18 1318     Digoxin 0.60 ng/mL     Comprehensive Metabolic Panel [[id number]]  (Abnormal) Collected:  02/12/18 1213    Specimen:  Blood Updated:  02/12/18 1325     Glucose 109 (H) mg/dL      BUN 28 (H) mg/dL      Creatinine 1.33 (H) mg/dL      Sodium 139 mmol/L      Potassium 4.6 mmol/L      Chloride 105 mmol/L      CO2 18.1 (L) mmol/L      Calcium 7.5 (L) mg/dL      Total Protein 6.3 g/dL      Albumin 2.70 (L) g/dL      ALT (SGPT) 28 U/L      AST (SGOT) 32 U/L      Alkaline Phosphatase 124 (H) U/L      Total Bilirubin 0.9 mg/dL      eGFR Non African Amer 52 (L) mL/min/1.73      Globulin 3.6 gm/dL      A/G Ratio 0.8 g/dL      BUN/Creatinine Ratio 21.1     Anion Gap 15.9 mmol/L     Narrative:       The MDRD GFR formula is only valid for adults with stable renal function between ages 18 and 70.    Hemoglobin A1c [[id number]]  (Abnormal) Collected:  02/12/18 1213    Specimen:  Blood Updated:  02/12/18 2119     Hemoglobin A1C 5.90 (H) %     Narrative:       Hemoglobin A1C Ranges:    Increased Risk for Diabetes  5.7% to 6.4%  Diabetes                     >= 6.5%  Diabetic Goal                < 7.0%    T4, Free [[id number]]  (Normal)  Collected:  02/12/18 1213    Specimen:  Blood Updated:  02/12/18 2130     Free T4 1.22 ng/dL     Blood Culture - Blood, [[id number]]  (Normal) Collected:  02/11/18 2156    Specimen:  Blood from Arm, Right Updated:  02/12/18 2216     Blood Culture No growth at 24 hours    Blood Culture - Blood, [[id number]]  (Normal) Collected:  02/11/18 2156    Specimen:  Blood from Arm, Right Updated:  02/12/18 2216     Blood Culture No growth at 24 hours    Lipid Panel [[id number]]  (Abnormal) Collected:  02/13/18 0521    Specimen:  Blood Updated:  02/13/18 0609     Total Cholesterol 86 mg/dL      Triglycerides 90 mg/dL      HDL Cholesterol 23 (L) mg/dL      LDL Cholesterol  45 mg/dL      VLDL Cholesterol 18 mg/dL      LDL/HDL Ratio 1.96    Narrative:       Cholesterol Reference Ranges  (U.S[place] Department of Health and Human Services ATP III Classifications)    Desirable          <200 mg/dL  Borderline High    200-239 mg/dL  High Risk          >240 mg/dL      Triglyceride Reference Ranges  (U.S. Department of Health and Human Services ATP III Classifications)    Normal           <150 mg/dL  Borderline High  150-199 mg/dL  High             200-499 mg/dL  Very High        >500 mg/dL    HDL Reference Ranges  (U.S. Department of Health and Human Services ATP III Classifcations)    Low     <40 mg/dl (major risk factor for CHD)  High    >60 mg/dl ('negative' risk factor for CHD)        LDL Reference Ranges  (U.S. Department of Health and Human Services ATP III Classifcations)    Optimal          <100 mg/dL  Near Optimal     100-129 mg/dL  Borderline High  130-159 mg/dL  High             160-189 mg/dL  Very High        >189 mg/dL    Comprehensive Metabolic Panel [[id number]]  (Abnormal) Collected:  02/13/18 0521    Specimen:  Blood Updated:  02/13/18 0609     Glucose 131 (H) mg/dL      BUN 33 (H) mg/dL      Creatinine 1.35 (H) mg/dL      Sodium 137 mmol/L      Potassium 3.9 mmol/L      Chloride 103 mmol/L      CO2 23.8 mmol/L      Calcium 7.8  (L) mg/dL      Total Protein 6.5 g/dL      Albumin 2.60 (L) g/dL      ALT (SGPT) 27 U/L      AST (SGOT) 29 U/L      Alkaline Phosphatase 116 U/L      Total Bilirubin 1.0 mg/dL      eGFR Non African Amer 51 (L) mL/min/1.73      Globulin 3.9 gm/dL      A/G Ratio 0.7 g/dL      BUN/Creatinine Ratio 24.4     Anion Gap 10.2 mmol/L     Narrative:       The MDRD GFR formula is only valid for adults with stable renal function between ages 18 and 70.    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  Switch to stress doses of prednisone  Restart AndroGel on discharge.  Decrease Zocor to 10 mg once a day on discharge.  Continue no concentrated sweet diet.  Will defer treatment of pneumonia to primary physician.       Gustavo Trujillo)

## 2021-03-02 DIAGNOSIS — Z23 IMMUNIZATION DUE: ICD-10-CM
